# Patient Record
Sex: FEMALE | Race: WHITE | NOT HISPANIC OR LATINO | Employment: OTHER | URBAN - METROPOLITAN AREA
[De-identification: names, ages, dates, MRNs, and addresses within clinical notes are randomized per-mention and may not be internally consistent; named-entity substitution may affect disease eponyms.]

---

## 2017-03-14 ENCOUNTER — HISTORICAL (OUTPATIENT)
Dept: LAB | Facility: HOSPITAL | Age: 67
End: 2017-03-14

## 2018-03-19 ENCOUNTER — HISTORICAL (OUTPATIENT)
Dept: LAB | Facility: HOSPITAL | Age: 68
End: 2018-03-19

## 2018-03-19 ENCOUNTER — HISTORICAL (OUTPATIENT)
Dept: ADMINISTRATIVE | Facility: HOSPITAL | Age: 68
End: 2018-03-19

## 2018-03-19 LAB
ABS NEUT (OLG): 3.2
ALBUMIN SERPL-MCNC: 4.3 GM/DL (ref 3.4–5)
ALBUMIN/GLOB SERPL: 1.42 {RATIO} (ref 1.5–2.5)
ALP SERPL-CCNC: 67 UNIT/L (ref 38–126)
ALT SERPL-CCNC: 16 UNIT/L (ref 7–52)
APPEARANCE, UA: ABNORMAL
AST SERPL-CCNC: 20 UNIT/L (ref 15–37)
BACTERIA #/AREA URNS AUTO: ABNORMAL /HPF
BILIRUB SERPL-MCNC: 0.6 MG/DL (ref 0.2–1)
BILIRUB UR QL STRIP: NEGATIVE MG/DL
BILIRUBIN DIRECT+TOT PNL SERPL-MCNC: 0.2 MG/DL (ref 0–0.5)
BUN SERPL-MCNC: 16 MG/DL (ref 7–18)
CALCIUM SERPL-MCNC: 9.2 MG/DL (ref 8.5–10)
CHLORIDE SERPL-SCNC: 105 MMOL/L (ref 98–107)
CHOLEST SERPL-MCNC: 168 MG/DL (ref 0–200)
CHOLEST/HDLC SERPL: 2.4 {RATIO}
CO2 SERPL-SCNC: 31 MMOL/L (ref 21–32)
COLOR UR: YELLOW
CREAT SERPL-MCNC: 0.52 MG/DL (ref 0.6–1.3)
CREAT/UREA NIT SERPL: 30.8
ERYTHROCYTE [DISTWIDTH] IN BLOOD BY AUTOMATED COUNT: 13.5 % (ref 11.5–17)
GGT SERPL-CCNC: 14 UNIT/L (ref 5–85)
GLOBULIN SER-MCNC: 3 GM/DL (ref 1.2–3)
GLUCOSE (UA): NEGATIVE MG/DL
GLUCOSE SERPL-MCNC: 103 MG/DL (ref 74–106)
HCT VFR BLD AUTO: 36.4 % (ref 37–47)
HDLC SERPL-MCNC: 71 MG/DL (ref 35–60)
HGB BLD-MCNC: 12.3 GM/DL (ref 12–16)
HGB UR QL STRIP: ABNORMAL UNIT/L
KETONES UR QL STRIP: NEGATIVE MG/DL
LDH SERPL-CCNC: 162 UNIT/L (ref 140–271)
LDLC SERPL CALC-MCNC: 69 MG/DL (ref 0–129)
LEUKOCYTE ESTERASE UR QL STRIP: ABNORMAL UNIT/L
LYMPHOCYTES # BLD AUTO: 1 X10(3)/MCL (ref 0.6–3.4)
LYMPHOCYTES NFR BLD AUTO: 23.1 % (ref 13–40)
MCH RBC QN AUTO: 30.9 PG (ref 27–31.2)
MCHC RBC AUTO-ENTMCNC: 34 GM/DL (ref 32–36)
MCV RBC AUTO: 92 FL (ref 80–94)
MONOCYTES # BLD AUTO: 0.3 X10(3)/MCL (ref 0–1.8)
MONOCYTES NFR BLD AUTO: 7 % (ref 0.1–24)
NEUTROPHILS NFR BLD AUTO: 69.9 % (ref 47–80)
NITRITE UR QL STRIP.AUTO: POSITIVE
PH UR STRIP: 7.5 [PH]
PLATELET # BLD AUTO: 359 X10(3)/MCL (ref 130–400)
PMV BLD AUTO: 8.3 FL
POTASSIUM SERPL-SCNC: 4.3 MMOL/L (ref 3.5–5.1)
PROT SERPL-MCNC: 7.3 GM/DL (ref 6.4–8.2)
PROT UR QL STRIP: NEGATIVE MG/DL
RBC # BLD AUTO: 3.98 X10(6)/MCL (ref 4.2–5.4)
RBC #/AREA URNS HPF: ABNORMAL /HPF
SODIUM SERPL-SCNC: 141 MMOL/L (ref 136–145)
SP GR UR STRIP: 1.02
SQUAMOUS EPITHELIAL, UA: ABNORMAL /LPF
TRIGL SERPL-MCNC: 79 MG/DL (ref 30–150)
UROBILINOGEN UR STRIP-ACNC: 0.2 MG/DL
VLDLC SERPL CALC-MCNC: 15.8 MG/DL
WBC # SPEC AUTO: 4.5 X10(3)/MCL (ref 4.5–11.5)
WBC #/AREA URNS AUTO: ABNORMAL /[HPF]

## 2019-03-20 ENCOUNTER — HISTORICAL (OUTPATIENT)
Dept: ADMINISTRATIVE | Facility: HOSPITAL | Age: 69
End: 2019-03-20

## 2019-03-20 LAB
ABS NEUT (OLG): 4 X10(3)/MCL (ref 2.1–9.2)
ALBUMIN SERPL-MCNC: 4 GM/DL (ref 3.4–5)
ALBUMIN/GLOB SERPL: 1.38 {RATIO} (ref 1.5–2.5)
ALP SERPL-CCNC: 54 UNIT/L (ref 38–126)
ALT SERPL-CCNC: 11 UNIT/L (ref 7–52)
APPEARANCE, UA: ABNORMAL
AST SERPL-CCNC: 20 UNIT/L (ref 15–37)
BACTERIA #/AREA URNS AUTO: ABNORMAL /HPF
BILIRUB SERPL-MCNC: 0.5 MG/DL (ref 0.2–1)
BILIRUB UR QL STRIP: NEGATIVE MG/DL
BILIRUBIN DIRECT+TOT PNL SERPL-MCNC: 0.1 MG/DL (ref 0–0.5)
BILIRUBIN DIRECT+TOT PNL SERPL-MCNC: 0.4 MG/DL
BUN SERPL-MCNC: 18 MG/DL (ref 7–18)
CALCIUM SERPL-MCNC: 8.8 MG/DL (ref 8.5–10)
CHLORIDE SERPL-SCNC: 104 MMOL/L (ref 98–107)
CHOLEST SERPL-MCNC: 213 MG/DL (ref 0–200)
CHOLEST/HDLC SERPL: 3.5 {RATIO}
CO2 SERPL-SCNC: 29 MMOL/L (ref 21–32)
COLOR UR: YELLOW
CREAT SERPL-MCNC: 0.51 MG/DL (ref 0.6–1.3)
ERYTHROCYTE [DISTWIDTH] IN BLOOD BY AUTOMATED COUNT: 12.7 % (ref 11.5–17)
GLOBULIN SER-MCNC: 3 GM/DL (ref 1.2–3)
GLUCOSE (UA): NEGATIVE MG/DL
GLUCOSE SERPL-MCNC: 95 MG/DL (ref 74–106)
HCT VFR BLD AUTO: 35.3 % (ref 37–47)
HDLC SERPL-MCNC: 61 MG/DL (ref 35–60)
HGB BLD-MCNC: 11.7 GM/DL (ref 12–16)
HGB UR QL STRIP: ABNORMAL UNIT/L
KETONES UR QL STRIP: ABNORMAL MG/DL
LDLC SERPL CALC-MCNC: 121 MG/DL (ref 0–129)
LEUKOCYTE ESTERASE UR QL STRIP: NEGATIVE UNIT/L
LYMPHOCYTES # BLD AUTO: 1 X10(3)/MCL (ref 0.6–3.4)
LYMPHOCYTES NFR BLD AUTO: 18.7 % (ref 13–40)
MCH RBC QN AUTO: 30.4 PG (ref 27–31.2)
MCHC RBC AUTO-ENTMCNC: 33 GM/DL (ref 32–36)
MCV RBC AUTO: 92 FL (ref 80–94)
MONOCYTES # BLD AUTO: 0.4 X10(3)/MCL (ref 0.1–1.3)
MONOCYTES NFR BLD AUTO: 8.3 % (ref 0.1–24)
NEUTROPHILS NFR BLD AUTO: 73 % (ref 47–80)
NITRITE UR QL STRIP.AUTO: POSITIVE
PH UR STRIP: 6 [PH]
PLATELET # BLD AUTO: 496 X10(3)/MCL (ref 130–400)
PMV BLD AUTO: 8.4 FL (ref 9.4–12.4)
POTASSIUM SERPL-SCNC: 4.5 MMOL/L (ref 3.5–5.1)
PROT SERPL-MCNC: 6.9 GM/DL (ref 6.4–8.2)
PROT UR QL STRIP: NEGATIVE MG/DL
RBC # BLD AUTO: 3.85 X10(6)/MCL (ref 4.2–5.4)
RBC #/AREA URNS HPF: ABNORMAL /HPF
SODIUM SERPL-SCNC: 138 MMOL/L (ref 136–145)
SP GR UR STRIP: 1.02
SQUAMOUS EPITHELIAL, UA: ABNORMAL /LPF
TRIGL SERPL-MCNC: 79 MG/DL (ref 30–150)
TSH SERPL-ACNC: 0.72 MIU/ML (ref 0.35–4.94)
UROBILINOGEN UR STRIP-ACNC: 0.2 MG/DL
VLDLC SERPL CALC-MCNC: 15.8 MG/DL
WBC # SPEC AUTO: 5.4 X10(3)/MCL (ref 4.5–11.5)
WBC #/AREA URNS AUTO: ABNORMAL /[HPF]

## 2019-08-05 ENCOUNTER — HISTORICAL (OUTPATIENT)
Dept: ADMINISTRATIVE | Facility: HOSPITAL | Age: 69
End: 2019-08-05

## 2019-08-05 LAB
ABS NEUT (OLG): 2.5 X10(3)/MCL (ref 2.1–9.2)
ALBUMIN SERPL-MCNC: 4 GM/DL (ref 3.4–5)
ALBUMIN/GLOB SERPL: 1.29 {RATIO} (ref 1.5–2.5)
ALP SERPL-CCNC: 65 UNIT/L (ref 38–126)
ALT SERPL-CCNC: 10 UNIT/L (ref 7–52)
AST SERPL-CCNC: 17 UNIT/L (ref 15–37)
BILIRUB SERPL-MCNC: 0.6 MG/DL (ref 0.2–1)
BILIRUBIN DIRECT+TOT PNL SERPL-MCNC: 0.2 MG/DL (ref 0–0.5)
BILIRUBIN DIRECT+TOT PNL SERPL-MCNC: 0.4 MG/DL
BUN SERPL-MCNC: 14 MG/DL (ref 7–18)
CALCIUM SERPL-MCNC: 8.6 MG/DL (ref 8.5–10)
CHLORIDE SERPL-SCNC: 107 MMOL/L (ref 98–107)
CHOLEST SERPL-MCNC: 156 MG/DL (ref 0–200)
CHOLEST/HDLC SERPL: 2.6 {RATIO}
CO2 SERPL-SCNC: 31 MMOL/L (ref 21–32)
CREAT SERPL-MCNC: 0.59 MG/DL (ref 0.6–1.3)
ERYTHROCYTE [DISTWIDTH] IN BLOOD BY AUTOMATED COUNT: 13.8 % (ref 11.5–17)
GLOBULIN SER-MCNC: 3.1 GM/DL (ref 1.2–3)
GLUCOSE SERPL-MCNC: 104 MG/DL (ref 74–106)
HCT VFR BLD AUTO: 34.4 % (ref 37–47)
HDLC SERPL-MCNC: 61 MG/DL (ref 35–60)
HGB BLD-MCNC: 11.7 GM/DL (ref 12–16)
LDLC SERPL CALC-MCNC: 64 MG/DL (ref 0–129)
LYMPHOCYTES # BLD AUTO: 1.3 X10(3)/MCL (ref 0.6–3.4)
LYMPHOCYTES NFR BLD AUTO: 33.6 % (ref 13–40)
MCH RBC QN AUTO: 30.8 PG (ref 27–31.2)
MCHC RBC AUTO-ENTMCNC: 34 GM/DL (ref 32–36)
MCV RBC AUTO: 90 FL (ref 80–94)
MONOCYTES # BLD AUTO: 0.2 X10(3)/MCL (ref 0.1–1.3)
MONOCYTES NFR BLD AUTO: 4.1 % (ref 0.1–24)
NEUTROPHILS NFR BLD AUTO: 62.3 % (ref 47–80)
PLATELET # BLD AUTO: 522 X10(3)/MCL (ref 130–400)
PMV BLD AUTO: 8.4 FL (ref 9.4–12.4)
POTASSIUM SERPL-SCNC: 4.1 MMOL/L (ref 3.5–5.1)
PROT SERPL-MCNC: 7.1 GM/DL (ref 6.4–8.2)
RBC # BLD AUTO: 3.8 X10(6)/MCL (ref 4.2–5.4)
SODIUM SERPL-SCNC: 141 MMOL/L (ref 136–145)
TRIGL SERPL-MCNC: 71 MG/DL (ref 30–150)
VLDLC SERPL CALC-MCNC: 14.2 MG/DL
WBC # SPEC AUTO: 4 X10(3)/MCL (ref 4.5–11.5)

## 2019-09-30 LAB — CRC RECOMMENDATION EXT: NORMAL

## 2021-01-06 ENCOUNTER — HISTORICAL (OUTPATIENT)
Dept: ADMINISTRATIVE | Facility: HOSPITAL | Age: 71
End: 2021-01-06

## 2021-01-06 LAB
ABS NEUT (OLG): 3.5 X10(3)/MCL (ref 2.1–9.2)
ALBUMIN SERPL-MCNC: 4.2 GM/DL (ref 3.4–5)
ALBUMIN/GLOB SERPL: 1.56 {RATIO} (ref 1.5–2.5)
ALP SERPL-CCNC: 61 UNIT/L (ref 38–126)
ALT SERPL-CCNC: 15 UNIT/L (ref 7–52)
APPEARANCE, UA: ABNORMAL
AST SERPL-CCNC: 20 UNIT/L (ref 15–37)
BACTERIA #/AREA URNS AUTO: ABNORMAL /HPF
BILIRUB SERPL-MCNC: 0.5 MG/DL (ref 0.2–1)
BILIRUB UR QL STRIP: NEGATIVE MG/DL
BILIRUBIN DIRECT+TOT PNL SERPL-MCNC: 0.1 MG/DL (ref 0–0.5)
BILIRUBIN DIRECT+TOT PNL SERPL-MCNC: 0.4 MG/DL
BUN SERPL-MCNC: 20 MG/DL (ref 7–18)
CALCIUM SERPL-MCNC: 9.4 MG/DL (ref 8.5–10)
CHLORIDE SERPL-SCNC: 106 MMOL/L (ref 98–107)
CHOLEST SERPL-MCNC: 173 MG/DL (ref 0–200)
CHOLEST/HDLC SERPL: 2.5 {RATIO}
CO2 SERPL-SCNC: 29 MMOL/L (ref 21–32)
COLOR UR: YELLOW
CREAT SERPL-MCNC: 0.54 MG/DL (ref 0.6–1.3)
ERYTHROCYTE [DISTWIDTH] IN BLOOD BY AUTOMATED COUNT: 13.7 % (ref 11.5–17)
GLOBULIN SER-MCNC: 2.7 GM/DL (ref 1.2–3)
GLUCOSE (UA): NEGATIVE MG/DL
GLUCOSE SERPL-MCNC: 98 MG/DL (ref 74–106)
HCT VFR BLD AUTO: 37 % (ref 37–47)
HDLC SERPL-MCNC: 70 MG/DL (ref 35–60)
HGB BLD-MCNC: 12.4 GM/DL (ref 12–16)
HGB UR QL STRIP: ABNORMAL UNIT/L
KETONES UR QL STRIP: NEGATIVE MG/DL
LDLC SERPL CALC-MCNC: 82 MG/DL (ref 0–129)
LEUKOCYTE ESTERASE UR QL STRIP: ABNORMAL UNIT/L
LYMPHOCYTES # BLD AUTO: 1.1 X10(3)/MCL (ref 0.6–3.4)
LYMPHOCYTES NFR BLD AUTO: 22.5 % (ref 13–40)
MCH RBC QN AUTO: 30.5 PG (ref 27–31.2)
MCHC RBC AUTO-ENTMCNC: 34 GM/DL (ref 32–36)
MCV RBC AUTO: 91 FL (ref 80–94)
MONOCYTES # BLD AUTO: 0.3 X10(3)/MCL (ref 0.1–1.3)
MONOCYTES NFR BLD AUTO: 6.8 % (ref 0.1–24)
NEUTROPHILS NFR BLD AUTO: 70.7 % (ref 47–80)
NITRITE UR QL STRIP.AUTO: NEGATIVE
PH UR STRIP: 6 [PH]
PLATELET # BLD AUTO: 790 X10(3)/MCL (ref 130–400)
PMV BLD AUTO: 8.3 FL (ref 9.4–12.4)
POTASSIUM SERPL-SCNC: 4.2 MMOL/L (ref 3.5–5.1)
PROT SERPL-MCNC: 6.9 GM/DL (ref 6.4–8.2)
PROT UR QL STRIP: NEGATIVE MG/DL
RBC # BLD AUTO: 4.06 X10(6)/MCL (ref 4.2–5.4)
RBC #/AREA URNS HPF: ABNORMAL /HPF
SODIUM SERPL-SCNC: 141 MMOL/L (ref 136–145)
SP GR UR STRIP: 1.02
SQUAMOUS EPITHELIAL, UA: ABNORMAL /LPF
TRIGL SERPL-MCNC: 73 MG/DL (ref 30–150)
TSH SERPL-ACNC: 0.62 MIU/ML (ref 0.35–4.94)
UROBILINOGEN UR STRIP-ACNC: 0.2 MG/DL
VLDLC SERPL CALC-MCNC: 14.6 MG/DL
WBC # SPEC AUTO: 4.9 X10(3)/MCL (ref 4.5–11.5)
WBC #/AREA URNS AUTO: ABNORMAL /[HPF]

## 2022-01-14 ENCOUNTER — HISTORICAL (OUTPATIENT)
Dept: ADMINISTRATIVE | Facility: HOSPITAL | Age: 72
End: 2022-01-14

## 2022-01-14 LAB
ABS NEUT (OLG): 3.7 X10(3)/MCL (ref 2.1–9.2)
ALBUMIN SERPL-MCNC: 4.3 GM/DL (ref 3.4–5)
ALBUMIN/GLOB SERPL: 1.79 {RATIO} (ref 1.5–2.5)
ALP SERPL-CCNC: 63 UNIT/L (ref 38–126)
ALT SERPL-CCNC: 16 UNIT/L (ref 7–52)
APPEARANCE, UA: ABNORMAL
AST SERPL-CCNC: 23 UNIT/L (ref 15–37)
BACTERIA #/AREA URNS AUTO: ABNORMAL /HPF
BILIRUB SERPL-MCNC: 0.5 MG/DL (ref 0.2–1)
BILIRUB UR QL STRIP: NEGATIVE MG/DL
BILIRUBIN DIRECT+TOT PNL SERPL-MCNC: 0.1 MG/DL (ref 0–0.5)
BILIRUBIN DIRECT+TOT PNL SERPL-MCNC: 0.4 MG/DL
BUN SERPL-MCNC: 19 MG/DL (ref 7–18)
CALCIUM SERPL-MCNC: 9.6 MG/DL (ref 8.5–10)
CHLORIDE SERPL-SCNC: 105 MMOL/L (ref 98–107)
CHOLEST SERPL-MCNC: 178 MG/DL (ref 0–200)
CHOLEST/HDLC SERPL: 2.5 {RATIO}
CO2 SERPL-SCNC: 29 MMOL/L (ref 21–32)
COLOR UR: YELLOW
CREAT SERPL-MCNC: 0.59 MG/DL (ref 0.6–1.3)
ERYTHROCYTE [DISTWIDTH] IN BLOOD BY AUTOMATED COUNT: 14.5 % (ref 11.5–17)
GLOBULIN SER-MCNC: 2.4 GM/DL (ref 1.2–3)
GLUCOSE (UA): NEGATIVE MG/DL
GLUCOSE SERPL-MCNC: 99 MG/DL (ref 74–106)
HCT VFR BLD AUTO: 33.7 % (ref 37–47)
HDLC SERPL-MCNC: 70 MG/DL (ref 35–60)
HGB BLD-MCNC: 11.1 GM/DL (ref 12–16)
HGB UR QL STRIP: ABNORMAL UNIT/L
KETONES UR QL STRIP: NEGATIVE MG/DL
LDLC SERPL CALC-MCNC: 84 MG/DL (ref 0–129)
LEUKOCYTE ESTERASE UR QL STRIP: NEGATIVE UNIT/L
LYMPHOCYTES # BLD AUTO: 1.2 X10(3)/MCL (ref 0.6–3.4)
LYMPHOCYTES NFR BLD AUTO: 21.1 % (ref 13–40)
MCH RBC QN AUTO: 31 PG (ref 27–31.2)
MCHC RBC AUTO-ENTMCNC: 33 GM/DL (ref 32–36)
MCV RBC AUTO: 94 FL (ref 80–94)
MONOCYTES # BLD AUTO: 0.6 X10(3)/MCL (ref 0.1–1.3)
MONOCYTES NFR BLD AUTO: 10.3 % (ref 0.1–24)
NEUTROPHILS NFR BLD AUTO: 68.6 % (ref 47–80)
NITRITE UR QL STRIP.AUTO: NEGATIVE
PH UR STRIP: 7 [PH]
PLATELET # BLD AUTO: 940 X10(3)/MCL (ref 130–400)
PMV BLD AUTO: 8.1 FL (ref 9.4–12.4)
POTASSIUM SERPL-SCNC: 4.5 MMOL/L (ref 3.5–5.1)
PROT SERPL-MCNC: 6.7 GM/DL (ref 6.4–8.2)
PROT UR QL STRIP: NEGATIVE MG/DL
RBC # BLD AUTO: 3.58 X10(6)/MCL (ref 4.2–5.4)
RBC #/AREA URNS HPF: ABNORMAL /HPF
SODIUM SERPL-SCNC: 140 MMOL/L (ref 136–145)
SP GR UR STRIP: 1.02
SQUAMOUS EPITHELIAL, UA: ABNORMAL /LPF
TRIGL SERPL-MCNC: 102 MG/DL (ref 30–150)
TSH SERPL-ACNC: 0.93 MIU/ML (ref 0.35–4.94)
UROBILINOGEN UR STRIP-ACNC: 0.2 MG/DL
VLDLC SERPL CALC-MCNC: 20.4 MG/DL
WBC # SPEC AUTO: 5.5 X10(3)/MCL (ref 4.5–11.5)
WBC #/AREA URNS AUTO: ABNORMAL /[HPF]

## 2022-02-15 LAB — BCS RECOMMENDATION EXT: NORMAL

## 2022-03-15 ENCOUNTER — HISTORICAL (OUTPATIENT)
Dept: ADMINISTRATIVE | Facility: HOSPITAL | Age: 72
End: 2022-03-15

## 2022-03-15 LAB — IRON SERPL-MCNC: 111 UG/DL (ref 50–170)

## 2022-03-16 ENCOUNTER — HISTORICAL (OUTPATIENT)
Dept: ADMINISTRATIVE | Facility: HOSPITAL | Age: 72
End: 2022-03-16

## 2022-03-16 LAB
FERRITIN SERPL-MCNC: 86.23 NG/ML (ref 4.63–204)
IRON SATN MFR SERPL: 34 % (ref 20–50)
IRON SERPL-MCNC: 88 UG/DL (ref 50–170)
TIBC SERPL-MCNC: 169 UG/DL (ref 70–310)
TIBC SERPL-MCNC: 257 UG/DL (ref 250–450)
TRANSFERRIN SERPL-MCNC: 245 MG/DL (ref 173–360)

## 2022-04-04 ENCOUNTER — HISTORICAL (OUTPATIENT)
Dept: LAB | Facility: HOSPITAL | Age: 72
End: 2022-04-04

## 2022-04-04 LAB
ABS NEUT (OLG): 2.73 (ref 2.1–9.2)
ALBUMIN SERPL-MCNC: 4.2 G/DL (ref 3.4–4.8)
ALBUMIN/GLOB SERPL: 1.4 {RATIO} (ref 1.1–2)
ALP SERPL-CCNC: 69 U/L (ref 40–150)
ALT SERPL-CCNC: 17 U/L (ref 0–55)
AST SERPL-CCNC: 23 U/L (ref 5–34)
BASOPHILS # BLD AUTO: 0.05 10*3/UL (ref 0–0.2)
BASOPHILS NFR BLD AUTO: 1.3 % (ref 0–1)
BILIRUB SERPL-MCNC: 0.3 MG/DL (ref 0.2–1.2)
BILIRUBIN DIRECT+TOT PNL SERPL-MCNC: 0.1 (ref 0–0.5)
BILIRUBIN DIRECT+TOT PNL SERPL-MCNC: 0.2 (ref 0–0.8)
BUN SERPL-MCNC: 13.7 MG/DL (ref 9.8–20.1)
CALCIUM SERPL-MCNC: 9.8 MG/DL (ref 8.4–10.2)
CHLORIDE SERPL-SCNC: 106 MMOL/L (ref 98–107)
CO2 SERPL-SCNC: 30 MMOL/L (ref 23–31)
CREAT SERPL-MCNC: 0.71 MG/DL (ref 0.57–1.11)
EOSINOPHIL # BLD AUTO: 0.07 10*3/UL (ref 0–0.9)
EOSINOPHIL NFR BLD AUTO: 1.8 % (ref 0–6.4)
ERYTHROCYTE [DISTWIDTH] IN BLOOD BY AUTOMATED COUNT: 13.5 % (ref 11.5–17)
GLOBULIN SER-MCNC: 2.9 G/DL (ref 2.4–3.5)
GLUCOSE SERPL-MCNC: 91 MG/DL (ref 82–115)
HCT VFR BLD AUTO: 34.1 % (ref 37–47)
HEMOLYSIS INTERF INDEX SERPL-ACNC: 2
HGB BLD-MCNC: 11.1 G/DL (ref 12–16)
ICTERIC INTERF INDEX SERPL-ACNC: 0
IMM GRANULOCYTES # BLD AUTO: 0.01 10*3/UL (ref 0–0.02)
IMM GRANULOCYTES NFR BLD AUTO: 0.3 % (ref 0–0.43)
LIPEMIC INTERF INDEX SERPL-ACNC: 14
LYMPHOCYTES # BLD AUTO: 0.82 10*3/UL (ref 0.6–4.6)
LYMPHOCYTES NFR BLD AUTO: 20.8 % (ref 16–44)
MANUAL DIFF? (OHS): NO
MCH RBC QN AUTO: 30.1 PG (ref 27–31)
MCHC RBC AUTO-ENTMCNC: 32.6 G/DL (ref 33–36)
MCV RBC AUTO: 92.4 FL (ref 80–94)
MONOCYTES # BLD AUTO: 0.26 10*3/UL (ref 0.1–1.3)
MONOCYTES NFR BLD AUTO: 6.6 % (ref 4–12.1)
NEUTROPHILS # BLD AUTO: 2.73 10*3/UL (ref 2.1–9.2)
NEUTROPHILS NFR BLD AUTO: 69.2 % (ref 43–73)
NRBC BLD AUTO-RTO: 0 % (ref 0–0.2)
PLATELET # BLD AUTO: 781 10*3/UL (ref 130–400)
PMV BLD AUTO: 8.6 FL (ref 7.4–10.4)
POTASSIUM SERPL-SCNC: 4.8 MMOL/L (ref 3.5–5.1)
PROT SERPL-MCNC: 7.1 G/DL (ref 5.8–7.6)
RBC # BLD AUTO: 3.69 10*6/UL (ref 4.2–5.4)
SODIUM SERPL-SCNC: 144 MMOL/L (ref 136–145)
WBC # SPEC AUTO: 3.9 10*3/UL (ref 4.5–11.5)

## 2022-04-11 ENCOUNTER — HISTORICAL (OUTPATIENT)
Dept: ADMINISTRATIVE | Facility: HOSPITAL | Age: 72
End: 2022-04-11
Payer: MEDICARE

## 2022-04-11 ENCOUNTER — HISTORICAL (OUTPATIENT)
Dept: ADMINISTRATIVE | Facility: HOSPITAL | Age: 72
End: 2022-04-11

## 2022-04-11 LAB
ABS NEUT (OLG): 2.25 (ref 2.1–9.2)
BASOPHILS # BLD AUTO: 0.05 10*3/UL (ref 0–0.2)
BASOPHILS NFR BLD AUTO: 1.5 % (ref 0–1)
EOSINOPHIL # BLD AUTO: 0.04 10*3/UL (ref 0–0.9)
EOSINOPHIL NFR BLD AUTO: 1.2 % (ref 0–6.4)
ERYTHROCYTE [DISTWIDTH] IN BLOOD BY AUTOMATED COUNT: 14.2 % (ref 11.5–17)
HCT VFR BLD AUTO: 34.6 % (ref 37–47)
HGB BLD-MCNC: 11.4 G/DL (ref 12–16)
IMM GRANULOCYTES # BLD AUTO: 0.01 10*3/UL (ref 0–0.02)
IMM GRANULOCYTES NFR BLD AUTO: 0.3 % (ref 0–0.43)
LYMPHOCYTES # BLD AUTO: 0.79 10*3/UL (ref 0.6–4.6)
LYMPHOCYTES NFR BLD AUTO: 23.1 % (ref 16–44)
MANUAL DIFF? (OHS): NO
MCH RBC QN AUTO: 31.1 PG (ref 27–31)
MCHC RBC AUTO-ENTMCNC: 32.9 G/DL (ref 33–36)
MCV RBC AUTO: 94.5 FL (ref 80–94)
MONOCYTES # BLD AUTO: 0.28 10*3/UL (ref 0.1–1.3)
MONOCYTES NFR BLD AUTO: 8.2 % (ref 4–12.1)
NEUTROPHILS # BLD AUTO: 2.25 10*3/UL (ref 2.1–9.2)
NEUTROPHILS NFR BLD AUTO: 65.7 % (ref 43–73)
NRBC BLD AUTO-RTO: 0 % (ref 0–0.2)
PLATELET # BLD AUTO: 639 10*3/UL (ref 130–400)
PMV BLD AUTO: 8.9 FL (ref 7.4–10.4)
RBC # BLD AUTO: 3.66 10*6/UL (ref 4.2–5.4)
WBC # SPEC AUTO: 3.4 10*3/UL (ref 4.5–11.5)

## 2022-04-18 ENCOUNTER — HISTORICAL (OUTPATIENT)
Dept: LAB | Facility: HOSPITAL | Age: 72
End: 2022-04-18
Payer: MEDICARE

## 2022-04-18 LAB
ABS NEUT (OLG): 2.07 (ref 2.1–9.2)
BASOPHILS # BLD AUTO: 0.04 10*3/UL (ref 0–0.2)
BASOPHILS NFR BLD AUTO: 1.3 % (ref 0–1)
EOSINOPHIL # BLD AUTO: 0.02 10*3/UL (ref 0–0.9)
EOSINOPHIL NFR BLD AUTO: 0.7 % (ref 0–6.4)
ERYTHROCYTE [DISTWIDTH] IN BLOOD BY AUTOMATED COUNT: 15.3 % (ref 11.5–17)
HCT VFR BLD AUTO: 34.4 % (ref 37–47)
HGB BLD-MCNC: 11.3 G/DL (ref 12–16)
IMM GRANULOCYTES # BLD AUTO: 0.01 10*3/UL (ref 0–0.02)
IMM GRANULOCYTES NFR BLD AUTO: 0.3 % (ref 0–0.43)
LYMPHOCYTES # BLD AUTO: 0.68 10*3/UL (ref 0.6–4.6)
LYMPHOCYTES NFR BLD AUTO: 22.2 % (ref 16–44)
MANUAL DIFF? (OHS): NO
MCH RBC QN AUTO: 30.3 PG (ref 27–31)
MCHC RBC AUTO-ENTMCNC: 32.8 G/DL (ref 33–36)
MCV RBC AUTO: 92.2 FL (ref 80–94)
MONOCYTES # BLD AUTO: 0.24 10*3/UL (ref 0.1–1.3)
MONOCYTES NFR BLD AUTO: 7.8 % (ref 4–12.1)
NEUTROPHILS # BLD AUTO: 2.07 10*3/UL (ref 2.1–9.2)
NEUTROPHILS NFR BLD AUTO: 67.7 % (ref 43–73)
NRBC BLD AUTO-RTO: 0 % (ref 0–0.2)
PLATELET # BLD AUTO: 543 10*3/UL (ref 130–400)
PMV BLD AUTO: 8.7 FL (ref 7.4–10.4)
RBC # BLD AUTO: 3.73 10*6/UL (ref 4.2–5.4)
WBC # SPEC AUTO: 3.1 10*3/UL (ref 4.5–11.5)

## 2022-04-27 ENCOUNTER — HISTORICAL (OUTPATIENT)
Dept: ADMINISTRATIVE | Facility: HOSPITAL | Age: 72
End: 2022-04-27
Payer: MEDICARE

## 2022-04-27 LAB
ABS NEUT (OLG): 1.93 (ref 2.1–9.2)
BASOPHILS # BLD AUTO: 0 10*3/UL (ref 0–0.2)
BASOPHILS NFR BLD AUTO: 1 %
EOSINOPHIL # BLD AUTO: 0 10*3/UL (ref 0–0.9)
EOSINOPHIL NFR BLD AUTO: 0.7 %
ERYTHROCYTE [DISTWIDTH] IN BLOOD BY AUTOMATED COUNT: 16.8 % (ref 11.5–17)
HCT VFR BLD AUTO: 32.6 % (ref 37–47)
HGB BLD-MCNC: 10.7 G/DL (ref 12–16)
LYMPHOCYTES # BLD AUTO: 0.7 10*3/UL (ref 0.6–4.6)
LYMPHOCYTES NFR BLD AUTO: 23.5 %
MANUAL DIFF? (OHS): NO
MCH RBC QN AUTO: 31.4 PG (ref 27–31)
MCHC RBC AUTO-ENTMCNC: 32.8 G/DL (ref 33–36)
MCV RBC AUTO: 95.6 FL (ref 80–94)
MONOCYTES # BLD AUTO: 0.2 10*3/UL (ref 0.1–1.3)
MONOCYTES NFR BLD AUTO: 8 %
NEUTROPHILS # BLD AUTO: 1.9 10*3/UL (ref 2.1–9.2)
NEUTROPHILS NFR BLD AUTO: 66.8 %
PLATELET # BLD AUTO: 504 10*3/UL (ref 130–400)
PMV BLD AUTO: 8.7 FL (ref 9.4–12.4)
RBC # BLD AUTO: 3.41 10*6/UL (ref 4.2–5.4)
WBC # SPEC AUTO: 2.9 10*3/UL (ref 4.5–11.5)

## 2022-04-28 VITALS
BODY MASS INDEX: 28.2 KG/M2 | DIASTOLIC BLOOD PRESSURE: 74 MMHG | HEIGHT: 67 IN | WEIGHT: 179.69 LBS | SYSTOLIC BLOOD PRESSURE: 136 MMHG

## 2022-05-11 DIAGNOSIS — D75.839 THROMBOCYTOSIS: Primary | ICD-10-CM

## 2022-05-13 ENCOUNTER — LAB VISIT (OUTPATIENT)
Dept: LAB | Facility: HOSPITAL | Age: 72
End: 2022-05-13
Attending: INTERNAL MEDICINE
Payer: MEDICARE

## 2022-05-13 DIAGNOSIS — D75.839 THROMBOCYTOSIS: ICD-10-CM

## 2022-05-13 LAB
BASOPHILS # BLD AUTO: 0.05 X10(3)/MCL (ref 0–0.2)
BASOPHILS NFR BLD AUTO: 1.4 %
EOSINOPHIL # BLD AUTO: 0.02 X10(3)/MCL (ref 0–0.9)
EOSINOPHIL NFR BLD AUTO: 0.6 %
ERYTHROCYTE [DISTWIDTH] IN BLOOD BY AUTOMATED COUNT: 19.9 % (ref 11.5–17)
HCT VFR BLD AUTO: 33.6 % (ref 37–47)
HGB BLD-MCNC: 11 GM/DL (ref 12–16)
IMM GRANULOCYTES # BLD AUTO: 0.01 X10(3)/MCL (ref 0–0.02)
IMM GRANULOCYTES NFR BLD AUTO: 0.3 % (ref 0–0.43)
LYMPHOCYTES # BLD AUTO: 0.64 X10(3)/MCL (ref 0.6–4.6)
LYMPHOCYTES NFR BLD AUTO: 18 %
MCH RBC QN AUTO: 32.4 PG (ref 27–31)
MCHC RBC AUTO-ENTMCNC: 32.7 MG/DL (ref 33–36)
MCV RBC AUTO: 99.1 FL (ref 80–94)
MONOCYTES # BLD AUTO: 0.23 X10(3)/MCL (ref 0.1–1.3)
MONOCYTES NFR BLD AUTO: 6.5 %
NEUTROPHILS # BLD AUTO: 2.6 X10(3)/MCL (ref 2.1–9.2)
NEUTROPHILS NFR BLD AUTO: 73.2 %
PLATELET # BLD AUTO: 489 X10(3)/MCL (ref 130–400)
PMV BLD AUTO: 9 FL (ref 9.4–12.4)
RBC # BLD AUTO: 3.39 X10(6)/MCL (ref 4.2–5.4)
WBC # SPEC AUTO: 3.6 X10(3)/MCL (ref 4.5–11.5)

## 2022-05-13 PROCEDURE — 85025 COMPLETE CBC W/AUTO DIFF WBC: CPT

## 2022-05-13 PROCEDURE — 36415 COLL VENOUS BLD VENIPUNCTURE: CPT

## 2022-05-18 ENCOUNTER — LAB VISIT (OUTPATIENT)
Dept: LAB | Facility: HOSPITAL | Age: 72
End: 2022-05-18
Attending: INTERNAL MEDICINE
Payer: MEDICARE

## 2022-05-18 DIAGNOSIS — D75.839 THROMBOCYTOSIS: ICD-10-CM

## 2022-05-18 LAB
BASOPHILS # BLD AUTO: 0.03 X10(3)/MCL (ref 0–0.2)
BASOPHILS NFR BLD AUTO: 1.1 %
EOSINOPHIL # BLD AUTO: 0.02 X10(3)/MCL (ref 0–0.9)
EOSINOPHIL NFR BLD AUTO: 0.7 %
ERYTHROCYTE [DISTWIDTH] IN BLOOD BY AUTOMATED COUNT: 21.2 % (ref 11.5–17)
HCT VFR BLD AUTO: 33.4 % (ref 37–47)
HGB BLD-MCNC: 10.9 GM/DL (ref 12–16)
IMM GRANULOCYTES # BLD AUTO: 0 X10(3)/MCL (ref 0–0.02)
IMM GRANULOCYTES NFR BLD AUTO: 0 % (ref 0–0.43)
LYMPHOCYTES # BLD AUTO: 0.8 X10(3)/MCL (ref 0.6–4.6)
LYMPHOCYTES NFR BLD AUTO: 29.6 %
MCH RBC QN AUTO: 32.9 PG (ref 27–31)
MCHC RBC AUTO-ENTMCNC: 32.6 MG/DL (ref 33–36)
MCV RBC AUTO: 100.9 FL (ref 80–94)
MONOCYTES # BLD AUTO: 0.24 X10(3)/MCL (ref 0.1–1.3)
MONOCYTES NFR BLD AUTO: 8.9 %
PLATELET # BLD AUTO: 439 X10(3)/MCL (ref 130–400)
PMV BLD AUTO: 8.7 FL (ref 9.4–12.4)
RBC # BLD AUTO: 3.31 X10(6)/MCL (ref 4.2–5.4)
WBC # SPEC AUTO: 2.7 X10(3)/MCL (ref 4.5–11.5)

## 2022-05-18 PROCEDURE — 85025 COMPLETE CBC W/AUTO DIFF WBC: CPT

## 2022-05-18 PROCEDURE — 36415 COLL VENOUS BLD VENIPUNCTURE: CPT

## 2022-05-21 NOTE — HISTORICAL [facility]ER
This is a historical note converted from Barbie. Formatting and pictures may have been removed.  Please reference Barbie for original formatting and attached multimedia. Chief Complaint  Annual medicare wellness/fasting  History of Present Illness  Patient is here today for wellness CPX. ?She is tolerating all medications without side effects. ?She does complain of occasional left SI joint disc comfort.? Her mammogram is due in February.? She is not exercising regularly but will try to increase walking 250 minutes/week.? She would like her flu shot today. ?She did have COVID back in December?and recovered fully other than?lingering fatigue.  Review of Systems  GENERAL:??no?unexplained wtloss, fever, + fatigue, chills, night sweats or weakness  HEENT: no? sore throat, ear pain, sinus pressure, nasal congestion, or rhinorrhea  VISION:?no vision changes, glaucoma, cataracts  CARDIAC: no?chest pain,?palpitations,?Dyspnea on exertion,?orthopnea  RESPIRATORY:?no?cough,?wheezing, sputum production,or?SOB--Had covid in DEC  GI: no??abdominal pain,?n&v, constipation,?diarrhea,??blood in stool or_family history of colon cancer_  :?no? dysuria, hematuria, frequency, ?urgency, incontinence,? vaginal discharge,? abn. vaginal bleeding  MUSC/SKEL:? no? myalgia, weakness, edema,? arthralgia, or?joint effusion--+SI jt  SKIN:? No?rash, hives, ?itching or ?sores--had basal cell removed  NEURO:? No headaches, numbness, ?tingling, weakness, or?dizziness  PSYCH:? ok anxiety and ??depression, no ?irritability, ?suicidal ideation or?hallucinations  ENDO:? No polyuria, polydipsia, ?polyphagia  HEME:? No?Bruising, ?lymphadenopathy, bleeding disorders ?or?signs of anemia  Physical Exam  Vitals & Measurements  HR:?88(Peripheral)? BP:?136/74?  HT:?170.10?cm? WT:?81.500?kg? BMI:?28.17?  GENERAL: NAD, alert and oriented x 3  SKIN:? no rash or abnormal appearing skin lesions  HEENT:? PERRLA, EOMI, mouth wnl, throat wnl, EAC and TM wnl  bilaterally  NECK:? FROM, no lymphadenopathy, no thyroid abnormalities palpable  CHEST:? CTA bilaterally no wheezes, crackles or rubs  CARDIAC:? RRR, no murmurs audible  ABDOMEN:? Soft, nontender, nondistended, NBSx4,?no rebound or guarding, no HSM  EXTREMITIES:? no clubbing, cyanosis, or edema.? joints wnl. +2 DP/PT pulse bilaterally  NEURO:? no sensory or motor deficits noted. CN II-XII intact. Gait wnl.?  GENITAL: defer to gyn none, set up MMG  Assessment/Plan  1.?Wellness examination?Z00.00  CBC, CMP, FLP, TSH, U/A, GYN none, MMG?due 2/11/2022 OLOL, Dexa 2/2021 due 2023, colonoscopy 2019 due 2029, Encourage pt to increase cardiovascular exercise and attempt to obtain at least 150 minutes of moderate aerobic exercise per week or 75 minutes of vigorous aerobic exercise weekly.? Recommend at least 2 days of weight bearing exercise to help increase bone density.  Ordered:  Clinic Follow-Up Wellness, *Est. 01/14/23 3:00:00 CST, Order for future visit, Wellness examination  Hypertension  Hypercholesterolemia  Depression, [facility]Link AFP  Lab Collection Request, 01/14/22 7:54:00 CST, [facility] AMB - AFP, 01/14/22 7:54:00 CST, Wellness examination  Hypertension  Hypercholesterolemia  Depression  Lab Collection Request, 01/14/22 8:00:00 CST, [facility]LINK AMB - AFP, 01/14/22 8:00:00 CST, Wellness examination  Hypertension  Hypercholesterolemia  Depression  SI (sacroiliac) pain  Medicare Annual Wellness- Subsequent  PC, Wellness examination  Hypertension  Depression  Hypercholesterolemia, [facility]LINK AMB - AFP, 01/14/22 7:54:00 CST  Schedule Diagnostics Study, screening MMG, OLOL after 2/11/22, 01/14/22 7:54:00 CST, Wellness examination  ?  2.?Encounter for immunization?Z23  high dose flu shot, got covid vaccine and booster  Ordered:  Influenza Virus Vaccine, Inactivated, 0.5 mL, IM, Once-Unscheduled, first dose 01/14/22 7:54:00 CST  ?  3.?Hypertension?I10  ?continue Amlodipine 10 mg and Benazepril 40 mg  Ordered:  Clinic  Follow-Up Wellness, *Est. 01/14/23 3:00:00 CST, Order for future visit, Wellness examination  Hypertension  Hypercholesterolemia  Depression, [facility]Link AFP  Comprehensive Metabolic Panel, Routine collect, 01/14/22 8:00:00 CST, Blood, Order for future visit, Stop date 01/14/22 8:00:00 CST, Lab Collect, Hypertension, 01/14/22 8:00:00 CST  Lab Collection Request, 01/14/22 7:54:00 CST, [facility]LINK AMB - AFP, 01/14/22 7:54:00 CST, Wellness examination  Hypertension  Hypercholesterolemia  Depression  Lab Collection Request, 01/14/22 8:00:00 CST, HLINK AMB - AFP, 01/14/22 8:00:00 CST, Wellness examination  Hypertension  Hypercholesterolemia  Depression  SI (sacroiliac) pain  Medicare Annual Wellness- Subsequent  PC, Wellness examination  Hypertension  Depression  Hypercholesterolemia, [facility]LINK AMB - AFP, 01/14/22 7:54:00 CST  Thyroid Stimulating Hormone, Routine collect, 01/14/22 8:00:00 CST, Blood, Order for future visit, Stop date 01/14/22 8:00:00 CST, Lab Collect, Hypertension  Hypercholesterolemia  Depression, 01/14/22 8:00:00 CST  Urinalysis no Reflex, Routine collect, Urine, Order for future visit, 01/14/22 8:00:00 CST, Stop date 01/14/22 8:00:00 CST, Nurse collect, Hypertension  ?  4.?Hypercholesterolemia?E78.00  ?continue atorvastatin 20 mg  Ordered:  CBC w/ Auto Diff, Routine collect, 01/14/22 8:00:00 CST, Blood, Order for future visit, Stop date 01/14/22 8:00:00 CST, Lab Collect, SI (sacroiliac) pain  Hypercholesterolemia, 01/14/22 8:00:00 CST  Clinic Follow-Up Wellness, *Est. 01/14/23 3:00:00 CST, Order for future visit, Wellness examination  Hypertension  Hypercholesterolemia  Depression, HLink AFP  Lab Collection Request, 01/14/22 7:54:00 CST, HLINK AMB - AFP, 01/14/22 7:54:00 CST, Wellness examination  Hypertension  Hypercholesterolemia  Depression  Lab Collection Request, 01/14/22 8:00:00 CST, HLINK AMB - AFP, 01/14/22 8:00:00 CST, Wellness examination  Hypertension  Hypercholesterolemia   Depression  SI (sacroiliac) pain  Lipid Panel, Routine collect, 01/14/22 8:00:00 CST, Blood, Order for future visit, Stop date 01/14/22 8:00:00 CST, Lab Collect, Hypercholesterolemia, 01/14/22 8:00:00 CST  Medicare Annual Wellness- Subsequent  PC, Wellness examination  Hypertension  Depression  Hypercholesterolemia, HLINK AMB - AFP, 01/14/22 7:54:00 CST  Thyroid Stimulating Hormone, Routine collect, 01/14/22 8:00:00 CST, Blood, Order for future visit, Stop date 01/14/22 8:00:00 CST, Lab Collect, Hypertension  Hypercholesterolemia  Depression, 01/14/22 8:00:00 CST  ?  5.?Depression?F32.9  ?continue Lexapro 20 mg 1/2 daily  Ordered:  Clinic Follow-Up Wellness, *Est. 01/14/23 3:00:00 CST, Order for future visit, Wellness examination  Hypertension  Hypercholesterolemia  Depression, HLink AFP  Lab Collection Request, 01/14/22 7:54:00 CST, HLINK AMB - AFP, 01/14/22 7:54:00 CST, Wellness examination  Hypertension  Hypercholesterolemia  Depression  Lab Collection Request, 01/14/22 8:00:00 CST, HLINK AMB - AFP, 01/14/22 8:00:00 CST, Wellness examination  Hypertension  Hypercholesterolemia  Depression  SI (sacroiliac) pain  Medicare Annual Wellness- Subsequent  PC, Wellness examination  Hypertension  Depression  Hypercholesterolemia, HLINK AMB - AFP, 01/14/22 7:54:00 CST  Thyroid Stimulating Hormone, Routine collect, 01/14/22 8:00:00 CST, Blood, Order for future visit, Stop date 01/14/22 8:00:00 CST, Lab Collect, Hypertension  Hypercholesterolemia  Depression, 01/14/22 8:00:00 CST  ?  6.?SI (sacroiliac) pain?M53.3  left--voltaren gel,stretches  Ordered:  CBC w/ Auto Diff, Routine collect, 01/14/22 8:00:00 CST, Blood, Order for future visit, Stop date 01/14/22 8:00:00 CST, Lab Collect, SI (sacroiliac) pain  Hypercholesterolemia, 01/14/22 8:00:00 CST  Lab Collection Request, 01/14/22 8:00:00 CST, HLINK AMB - AFP, 01/14/22 8:00:00 CST, Wellness examination  Hypertension  Hypercholesterolemia   Depression  SI (sacroiliac) pain  ?  Orders:  amLODIPine, See Instructions, TAKE 1 TABLET BY MOUTH DAILY, # 90 tab(s), 3 Refill(s), Pharmacy: [facility], 170.1, cm, Height/Length Dosing, 01/14/22 7:40:00 CST, 81.5, kg, Weight Dosing, 01/14/22 7:40:00 CST  atorvastatin, See Instructions, TAKE ONE TABLET BY MOUTH DAILY, # 90 tab(s), 3 Refill(s), Pharmacy: [facility], 170.1, cm, Height/Length Dosing, 01/14/22 7:40:00 CST, 81.5, kg, Weight Dosing, 01/14/22 7:40:00 CST  benazepril, See Instructions, TAKE 1 TABLET BY MOUTH DAILY, # 90 tab(s), 3 Refill(s), Pharmacy: [facility], 170.1, cm, Height/Length Dosing, 01/14/22 7:40:00 CST, 81.5, kg, Weight Dosing, 01/14/22 7:40:00 CST  escitalopram, See Instructions, TAKE 1/2 TABLET BY MOUTH DAILY, # 45 tab(s), 3 Refill(s), Pharmacy: [facility], 170.1, cm, Height/Length Dosing, 01/14/22 7:40:00 CST, 81.5, kg, Weight Dosing, 01/14/22 7:40:00 CST  Referrals  Clinic Follow-Up Wellness, *Est. 01/14/23 3:00:00 CST, Order for future visit, Wellness examination  Hypertension  Hypercholesterolemia  Depression, HLink AFP   Problem List/Past Medical History  Ongoing  Depression  Hypercholesterolemia  Hypertension  Wellness examination  Historical  Depression  HTN - Hypertension  Hypercholesterolemia  Hypertension  Procedure/Surgical History  Colonoscopy (09/30/2019)  Mammogram (04/18/2018)  Colonoscopy (04/30/2009)  Excision of basal cell carcinoma  High ligation and local ligation of varicose veins   Medications  Adult High-Dose Influenza Vaccine, 0.5 mL, IM, Once-Unscheduled  amLODIPine 10 mg oral tablet, See Instructions, 3 refills  atorvastatin 20 mg oral tablet, See Instructions, 3 refills  benazepril 40 mg oral tablet, See Instructions, 3 refills  Centrum oral tablet, 1 tab(s), Oral, Daily  Citracal Regular, 1 tab(s), Oral, Daily  escitalopram 20 mg oral tablet, See Instructions, 3  refills  Allergies  Cipro?(Unknown)  Social History  Abuse/Neglect  No, 01/14/2022  No, 01/06/2021  No, 10/23/2019  Alcohol  Never, 03/16/2018  Employment/School  HOMEMAKER, 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  Dementia: Brother.  Drowning: Sister.  Hypertension.: Mother and Brother.  Lung cancer: Mother.  TIA - Transient ischaemic attack: Brother.  Immunizations  Vaccine Date Status Comments   COVID-19 MRNA, LNP-S, PF- Pfizer 12/13/2021 Recorded    COVID-19 MRNA, LNP-S, PF- Pfizer 03/31/2021 Recorded    COVID-19 MRNA, LNP-S, PF- Pfizer 03/10/2021 Recorded    influenza virus vaccine, inactivated 01/06/2021 Given    influenza virus vaccine, inactivated 10/23/2019 Given    pneumococcal 23-polyvalent vaccine 03/19/2018 Given    pneumococcal 13-valent conjugate vaccine 03/14/2017 Recorded    influenza virus vaccine, inactivated 11/15/2013 Recorded    influenza virus vaccine, inactivated 12/13/2011 Recorded 2022-01-13: UNKNOWN CAMPAIGNID   Health Maintenance  Health Maintenance  ???Pending?(in the next year)  ??? ??OverDue  ??? ? ? ?Influenza Vaccine due??10/01/21??and every 1??day(s)  ??? ? ? ?Advance Directive due??01/02/22??and every 1??year(s)  ??? ? ? ?Cognitive Screening due??01/02/22??and every 1??year(s)  ??? ? ? ?Fall Risk Assessment due??01/02/22??and every 1??year(s)  ??? ? ? ?Functional Assessment due??01/02/22??and every 1??year(s)  ??? ? ? ?Hypertension Management-BMP due??01/06/22??and every 1??year(s)  ??? ??Due?  ??? ? ? ?Alcohol Misuse Screening due??01/02/22??and every 1??year(s)  ??? ? ? ?ADL Screening due??01/14/22??and every 1??year(s)  ??? ? ? ?Aspirin Therapy for CVD  Prevention due??01/14/22??and every 1??year(s)  ??? ? ? ?Hypertension Management-Education due??01/14/22??and every 1??year(s)  ??? ? ? ?Tetanus Vaccine due??01/14/22??and every 10??year(s)  ??? ? ? ?Zoster Vaccine due??01/14/22??Unknown Frequency  ??? ??Due In Future?  ??? ? ? ?Obesity Screening not due until??01/01/23??and every 1??year(s)  ???Satisfied?(in the past 1 year)  ??? ??Satisfied?  ??? ? ? ?Blood Pressure Screening on??01/14/22.??Satisfied by Summer Rivera CMA.  ??? ? ? ?Body Mass Index Check on??01/14/22.??Satisfied by Summer Rivera CMA.  ??? ? ? ?Bone Density Screening on??02/11/21.??Satisfied by Dorota Colbert  ??? ? ? ?Breast Cancer Screening on??02/23/21.??Satisfied by Geraldine Tavarez  ??? ? ? ?Hypertension Management-Blood Pressure on??01/14/22.??Satisfied by Summer Rivera CMA  ??? ? ? ?Influenza Vaccine on??01/14/22.??Satisfied by Isaac Brian MD  ??? ? ? ?Medicare Annual Wellness Exam on??01/14/22.??Satisfied by Isaac Brian MD  ??? ? ? ?Obesity Screening on??01/14/22.??Satisfied by Summer Rivera CMA  ?

## 2022-05-26 ENCOUNTER — OFFICE VISIT (OUTPATIENT)
Dept: HEMATOLOGY/ONCOLOGY | Facility: CLINIC | Age: 72
End: 2022-05-26
Payer: MEDICARE

## 2022-05-26 VITALS
HEIGHT: 66 IN | WEIGHT: 178 LBS | HEART RATE: 73 BPM | DIASTOLIC BLOOD PRESSURE: 79 MMHG | TEMPERATURE: 98 F | OXYGEN SATURATION: 98 % | BODY MASS INDEX: 28.61 KG/M2 | SYSTOLIC BLOOD PRESSURE: 146 MMHG

## 2022-05-26 DIAGNOSIS — D47.3 ESSENTIAL THROMBOCYTOSIS: ICD-10-CM

## 2022-05-26 DIAGNOSIS — Z51.81 THERAPEUTIC DRUG MONITORING: ICD-10-CM

## 2022-05-26 DIAGNOSIS — D72.819 LEUKOPENIA, UNSPECIFIED TYPE: ICD-10-CM

## 2022-05-26 DIAGNOSIS — D64.9 ANEMIA, UNSPECIFIED TYPE: ICD-10-CM

## 2022-05-26 PROBLEM — D75.839 THROMBOCYTOSIS: Status: ACTIVE | Noted: 2022-05-26

## 2022-05-26 PROCEDURE — 1160F RVW MEDS BY RX/DR IN RCRD: CPT | Mod: CPTII,S$GLB,, | Performed by: INTERNAL MEDICINE

## 2022-05-26 PROCEDURE — 3008F PR BODY MASS INDEX (BMI) DOCUMENTED: ICD-10-PCS | Mod: CPTII,S$GLB,, | Performed by: INTERNAL MEDICINE

## 2022-05-26 PROCEDURE — 99215 PR OFFICE/OUTPT VISIT, EST, LEVL V, 40-54 MIN: ICD-10-PCS | Mod: S$GLB,,, | Performed by: INTERNAL MEDICINE

## 2022-05-26 PROCEDURE — 1159F MED LIST DOCD IN RCRD: CPT | Mod: CPTII,S$GLB,, | Performed by: INTERNAL MEDICINE

## 2022-05-26 PROCEDURE — 3078F DIAST BP <80 MM HG: CPT | Mod: CPTII,S$GLB,, | Performed by: INTERNAL MEDICINE

## 2022-05-26 PROCEDURE — 99999 PR PBB SHADOW E&M-EST. PATIENT-LVL III: CPT | Mod: PBBFAC,,, | Performed by: INTERNAL MEDICINE

## 2022-05-26 PROCEDURE — 3288F PR FALLS RISK ASSESSMENT DOCUMENTED: ICD-10-PCS | Mod: CPTII,S$GLB,, | Performed by: INTERNAL MEDICINE

## 2022-05-26 PROCEDURE — 1160F PR REVIEW ALL MEDS BY PRESCRIBER/CLIN PHARMACIST DOCUMENTED: ICD-10-PCS | Mod: CPTII,S$GLB,, | Performed by: INTERNAL MEDICINE

## 2022-05-26 PROCEDURE — 3008F BODY MASS INDEX DOCD: CPT | Mod: CPTII,S$GLB,, | Performed by: INTERNAL MEDICINE

## 2022-05-26 PROCEDURE — 3288F FALL RISK ASSESSMENT DOCD: CPT | Mod: CPTII,S$GLB,, | Performed by: INTERNAL MEDICINE

## 2022-05-26 PROCEDURE — 1101F PT FALLS ASSESS-DOCD LE1/YR: CPT | Mod: CPTII,S$GLB,, | Performed by: INTERNAL MEDICINE

## 2022-05-26 PROCEDURE — 3077F PR MOST RECENT SYSTOLIC BLOOD PRESSURE >= 140 MM HG: ICD-10-PCS | Mod: CPTII,S$GLB,, | Performed by: INTERNAL MEDICINE

## 2022-05-26 PROCEDURE — 4010F ACE/ARB THERAPY RXD/TAKEN: CPT | Mod: CPTII,S$GLB,, | Performed by: INTERNAL MEDICINE

## 2022-05-26 PROCEDURE — 1126F PR PAIN SEVERITY QUANTIFIED, NO PAIN PRESENT: ICD-10-PCS | Mod: CPTII,S$GLB,, | Performed by: INTERNAL MEDICINE

## 2022-05-26 PROCEDURE — 3078F PR MOST RECENT DIASTOLIC BLOOD PRESSURE < 80 MM HG: ICD-10-PCS | Mod: CPTII,S$GLB,, | Performed by: INTERNAL MEDICINE

## 2022-05-26 PROCEDURE — 1101F PR PT FALLS ASSESS DOC 0-1 FALLS W/OUT INJ PAST YR: ICD-10-PCS | Mod: CPTII,S$GLB,, | Performed by: INTERNAL MEDICINE

## 2022-05-26 PROCEDURE — 3077F SYST BP >= 140 MM HG: CPT | Mod: CPTII,S$GLB,, | Performed by: INTERNAL MEDICINE

## 2022-05-26 PROCEDURE — 4010F PR ACE/ARB THEARPY RXD/TAKEN: ICD-10-PCS | Mod: CPTII,S$GLB,, | Performed by: INTERNAL MEDICINE

## 2022-05-26 PROCEDURE — 99999 PR PBB SHADOW E&M-EST. PATIENT-LVL III: ICD-10-PCS | Mod: PBBFAC,,, | Performed by: INTERNAL MEDICINE

## 2022-05-26 PROCEDURE — 1159F PR MEDICATION LIST DOCUMENTED IN MEDICAL RECORD: ICD-10-PCS | Mod: CPTII,S$GLB,, | Performed by: INTERNAL MEDICINE

## 2022-05-26 PROCEDURE — 99215 OFFICE O/P EST HI 40 MIN: CPT | Mod: S$GLB,,, | Performed by: INTERNAL MEDICINE

## 2022-05-26 PROCEDURE — 1126F AMNT PAIN NOTED NONE PRSNT: CPT | Mod: CPTII,S$GLB,, | Performed by: INTERNAL MEDICINE

## 2022-05-26 RX ORDER — HYDROXYUREA 500 MG/1
500 CAPSULE ORAL 2 TIMES DAILY
COMMUNITY
Start: 2022-04-29 | End: 2022-07-18

## 2022-05-26 RX ORDER — ASPIRIN 81 MG/1
TABLET ORAL DAILY
COMMUNITY
Start: 2022-03-28

## 2022-05-26 RX ORDER — ATORVASTATIN CALCIUM 20 MG/1
20 TABLET, FILM COATED ORAL DAILY
COMMUNITY
Start: 2022-04-29 | End: 2022-10-31

## 2022-05-26 RX ORDER — BENAZEPRIL HYDROCHLORIDE 40 MG/1
40 TABLET ORAL DAILY
COMMUNITY
Start: 2022-04-29 | End: 2022-10-31

## 2022-05-26 RX ORDER — ONDANSETRON HYDROCHLORIDE 8 MG/1
TABLET, FILM COATED ORAL
COMMUNITY
Start: 2022-04-28 | End: 2022-06-23 | Stop reason: SDUPTHER

## 2022-05-26 RX ORDER — ESCITALOPRAM OXALATE 20 MG/1
10 TABLET ORAL DAILY
COMMUNITY
Start: 2022-04-29 | End: 2022-12-01

## 2022-05-26 RX ORDER — AMLODIPINE BESYLATE 10 MG/1
TABLET ORAL
COMMUNITY
Start: 2022-01-14 | End: 2022-09-07

## 2022-05-26 NOTE — PROGRESS NOTES
HEMATOLOGY/ONCOLOGY OFFICE CLINIC VISIT    Visit Information:  Visit type:  On-going care, Review/discussion of tests, Scheduled follow-up.    Accompanied by:  No one.    Source of history:  Self.    Referral source:  Roc GIFFORD, Isaac HUANG    History limitation:  None.        Initial Consultation: 3/28/2022  Referring Physician: Dr Brian  Other Physicians:  Code Status: Not addressed    Diagnosis/Problem list:   Essential Thrombocytosis  --CALR mutation analysis, exon 9: Positive.    Present Treatment:  Hydrea 500 mg daily    Treatment history:    Hydrea       Imaging:      Pathology:  March 21, 2022 15:21 Peripheral blood, CALR mutation analysis, exon 9: Positive. 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 Peripheral blood, JAK2 V617F mutation analysis: Negative for JAK2 V617F.    CLINICAL HISTORY:       Patient: 72 years old female kindly referred for thrombocytosis.     Reviewing electronic on medical record it seems like her platelets were normal up to March 2018.  In March 2019 platelets were mildly elevated 496,009 slowly there were trending up with the most recent on 3/15/2022 of 1,012,000.  She is taking baby aspirin.    On March 21, 2022 15:21 Peripheral blood, CALR mutation analysis, exon 9: Positive. 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  Peripheral blood, JAK2 V617F mutation analysis: Negative for JAK2 V617F.    She denies any family history of blood disorders or malignancies.          Chief Complaint: Here for ET    Interval History:  Patient presents today for scheduled follow up appointment.  Taking Hydrea 500mg twice a day and baby ASA daily. She has been doing well and has had no new problems since last visit.  She denies any fever, chills, sweats.  No chest pain or shortness of breath.  No bleeding.  No recent infections or hospitalizations.  Reviewed blood work with her and thrombocytosis better but leukopenic and anemic.  Will adjust Hydrea and change it to  "daily rather than twice a day.    ROS:  All 14 points ROS taken and as per Interval History    Histories:  PMH/PSH/FH/SOCIAL/ALLERGIES AND MEDS REVIEWED AND UPDATED AS APPROPRIATE       Vitals:    05/26/22 0913   BP: (!) 146/79   Pulse: 73   Temp: 97.7 °F (36.5 °C)   SpO2: 98%   Weight: 80.7 kg (178 lb)   Height: 5' 6" (1.676 m)      Physical Exam  Vitals and nursing note reviewed.   Constitutional:       General: She is not in acute distress.     Appearance: Normal appearance. She is well-developed.   HENT:      Head: Normocephalic and atraumatic.      Mouth/Throat:      Mouth: Mucous membranes are moist.   Eyes:      General: No scleral icterus.     Extraocular Movements: Extraocular movements intact.      Conjunctiva/sclera: Conjunctivae normal.      Pupils: Pupils are equal, round, and reactive to light.   Neck:      Vascular: No JVD.   Cardiovascular:      Rate and Rhythm: Normal rate and regular rhythm.      Heart sounds: No murmur heard.  Pulmonary:      Effort: Pulmonary effort is normal.      Breath sounds: Normal breath sounds. No wheezing or rhonchi.   Chest:      Chest wall: No deformity or tenderness.   Breasts:      Right: No swelling, mass, nipple discharge, skin change, tenderness, axillary adenopathy or supraclavicular adenopathy.      Left: No swelling, mass, nipple discharge, skin change, tenderness, axillary adenopathy or supraclavicular adenopathy.       Abdominal:      General: Bowel sounds are normal. There is no distension.      Palpations: Abdomen is soft. There is no mass.      Tenderness: There is no abdominal tenderness.   Musculoskeletal:         General: No swelling or deformity.      Cervical back: Neck supple.   Lymphadenopathy:      Cervical: No cervical adenopathy.      Upper Body:      Right upper body: No supraclavicular or axillary adenopathy.      Left upper body: No supraclavicular or axillary adenopathy.      Lower Body: No right inguinal adenopathy. No left inguinal " adenopathy.   Skin:     General: Skin is warm.      Coloration: Skin is not jaundiced.      Findings: No lesion or rash.      Nails: There is no clubbing.   Neurological:      General: No focal deficit present.      Mental Status: She is alert and oriented to person, place, and time.      Cranial Nerves: Cranial nerves are intact.      Sensory: Sensation is intact.      Motor: Motor function is intact.      Gait: Gait is intact.   Psychiatric:         Attention and Perception: Attention normal.         Mood and Affect: Mood and affect normal.         Speech: Speech normal.         Behavior: Behavior is cooperative.         Thought Content: Thought content normal.         Cognition and Memory: Cognition normal.         Judgment: Judgment normal.       ECOG SCORE    0 - Fully active-able to carry on all pre-disease performance without restriction         Laboratory:  CBC with Differential:  Lab Results   Component Value Date    WBC 2.6 (L) 05/23/2022    RBC 3.15 (L) 05/23/2022    HGB 10.5 (L) 05/23/2022    HCT 32.2 (L) 05/23/2022    .2 (H) 05/23/2022    MCH 33.3 (H) 05/23/2022    MCHC 32.6 (L) 05/23/2022    RDW 22.0 (H) 05/23/2022     (H) 05/23/2022    MPV 9.0 (L) 05/23/2022        CMP:  Sodium Level   Date Value Ref Range Status   04/04/2022 144 136 - 145      Potassium Level   Date Value Ref Range Status   04/04/2022 4.8 3.5 - 5.1      Carbon Dioxide   Date Value Ref Range Status   04/04/2022 30 23 - 31      Blood Urea Nitrogen   Date Value Ref Range Status   04/04/2022 13.7 9.8 - 20.1      Creatinine   Date Value Ref Range Status   04/04/2022 0.71 0.57 - 1.11      Calcium Level Total   Date Value Ref Range Status   04/04/2022 9.8 8.4 - 10.2      Albumin Level   Date Value Ref Range Status   04/04/2022 4.2 3.4 - 4.8      Bilirubin Total   Date Value Ref Range Status   04/04/2022 0.3 0.2 - 1.2      Alkaline Phosphatase   Date Value Ref Range Status   04/04/2022 69 40 - 150      Aspartate  Aminotransferase   Date Value Ref Range Status   04/04/2022 23 5 - 34      Alanine Aminotransferase   Date Value Ref Range Status   04/04/2022 17 0 - 55      Estimated GFR-Non    Date Value Ref Range Status   04/04/2022 >60               Assessment:       1. Essential thrombocytosis    2. Therapeutic drug monitoring    3. Anemia, unspecified type    4. Leukopenia, unspecified type      Essential thrombocytosis -- CALR mutation positive, 52 bp deletion Exon 9   --on Hydrea  Anemia-due to therapy  Leukopenia-due to therapy        Plan:       Recommend decreasing  Hydrea 500 mg po daily , will adjust if needed  Cont baby ASA daily  CBC in 2 weeks   RTC 4 weeks with CBC, CMP  Encouraged to call for any questions or problems    The patient was given ample opportunity to ask questions and they were all answered to satisfaction; patient demonstrated understanding of what we discussed and is agreeable to the plan.    HANH QUEZADA MD      Professional Services   I, Lena Murrieta LPN, acted solely as a scribe for and in the presence of Dr. Hanh Quezada, who performed these services.

## 2022-06-09 ENCOUNTER — LAB VISIT (OUTPATIENT)
Dept: LAB | Facility: HOSPITAL | Age: 72
End: 2022-06-09
Attending: INTERNAL MEDICINE
Payer: MEDICARE

## 2022-06-09 DIAGNOSIS — Z51.81 THERAPEUTIC DRUG MONITORING: ICD-10-CM

## 2022-06-09 DIAGNOSIS — D64.9 ANEMIA, UNSPECIFIED TYPE: ICD-10-CM

## 2022-06-09 DIAGNOSIS — D72.819 LEUKOPENIA, UNSPECIFIED TYPE: ICD-10-CM

## 2022-06-09 DIAGNOSIS — D47.3 ESSENTIAL THROMBOCYTOSIS: ICD-10-CM

## 2022-06-09 LAB
BASOPHILS # BLD AUTO: 0.02 X10(3)/MCL (ref 0–0.2)
BASOPHILS NFR BLD AUTO: 0.6 %
EOSINOPHIL # BLD AUTO: 0.03 X10(3)/MCL (ref 0–0.9)
EOSINOPHIL NFR BLD AUTO: 0.9 %
ERYTHROCYTE [DISTWIDTH] IN BLOOD BY AUTOMATED COUNT: ABNORMAL %
HCT VFR BLD AUTO: 32.3 % (ref 37–47)
HGB BLD-MCNC: 10.6 GM/DL (ref 12–16)
IMM GRANULOCYTES # BLD AUTO: 0 X10(3)/MCL (ref 0–0.02)
IMM GRANULOCYTES NFR BLD AUTO: 0 % (ref 0–0.43)
LYMPHOCYTES # BLD AUTO: 1 X10(3)/MCL (ref 0.6–4.6)
LYMPHOCYTES NFR BLD AUTO: 30.7 %
MCH RBC QN AUTO: 33.9 PG (ref 27–31)
MCHC RBC AUTO-ENTMCNC: 32.8 MG/DL (ref 33–36)
MCV RBC AUTO: 103.2 FL (ref 80–94)
MONOCYTES # BLD AUTO: 0.35 X10(3)/MCL (ref 0.1–1.3)
MONOCYTES NFR BLD AUTO: 10.7 %
NEUTROPHILS # BLD AUTO: 1.9 X10(3)/MCL (ref 2.1–9.2)
NEUTROPHILS NFR BLD AUTO: 57.1 %
PLATELET # BLD AUTO: 557 X10(3)/MCL (ref 130–400)
PMV BLD AUTO: 8.7 FL (ref 9.4–12.4)
RBC # BLD AUTO: 3.13 X10(6)/MCL (ref 4.2–5.4)
WBC # SPEC AUTO: 3.3 X10(3)/MCL (ref 4.5–11.5)

## 2022-06-09 PROCEDURE — 85025 COMPLETE CBC W/AUTO DIFF WBC: CPT

## 2022-06-09 PROCEDURE — 36415 COLL VENOUS BLD VENIPUNCTURE: CPT

## 2022-06-23 ENCOUNTER — LAB VISIT (OUTPATIENT)
Dept: LAB | Facility: HOSPITAL | Age: 72
End: 2022-06-23
Attending: INTERNAL MEDICINE
Payer: MEDICARE

## 2022-06-23 ENCOUNTER — OFFICE VISIT (OUTPATIENT)
Dept: HEMATOLOGY/ONCOLOGY | Facility: CLINIC | Age: 72
End: 2022-06-23
Payer: MEDICARE

## 2022-06-23 VITALS
WEIGHT: 178 LBS | HEIGHT: 66 IN | BODY MASS INDEX: 28.61 KG/M2 | SYSTOLIC BLOOD PRESSURE: 154 MMHG | HEART RATE: 72 BPM | TEMPERATURE: 98 F | OXYGEN SATURATION: 98 % | DIASTOLIC BLOOD PRESSURE: 85 MMHG

## 2022-06-23 DIAGNOSIS — R11.0 NAUSEA: ICD-10-CM

## 2022-06-23 DIAGNOSIS — D64.9 ANEMIA, UNSPECIFIED TYPE: ICD-10-CM

## 2022-06-23 DIAGNOSIS — D72.819 LEUKOPENIA, UNSPECIFIED TYPE: ICD-10-CM

## 2022-06-23 DIAGNOSIS — D47.3 ESSENTIAL THROMBOCYTOSIS: ICD-10-CM

## 2022-06-23 DIAGNOSIS — Z51.81 THERAPEUTIC DRUG MONITORING: ICD-10-CM

## 2022-06-23 LAB
ALBUMIN SERPL-MCNC: 4.3 GM/DL (ref 3.4–4.8)
ALBUMIN/GLOB SERPL: 1.6 RATIO (ref 1.1–2)
ALP SERPL-CCNC: 74 UNIT/L (ref 40–150)
ALT SERPL-CCNC: 15 UNIT/L (ref 0–55)
AST SERPL-CCNC: 23 UNIT/L (ref 5–34)
BASOPHILS # BLD AUTO: 0.03 X10(3)/MCL (ref 0–0.2)
BASOPHILS NFR BLD AUTO: 0.8 %
BILIRUBIN DIRECT+TOT PNL SERPL-MCNC: 0.5 MG/DL
BUN SERPL-MCNC: 15.9 MG/DL (ref 9.8–20.1)
CALCIUM SERPL-MCNC: 10.2 MG/DL (ref 8.4–10.2)
CHLORIDE SERPL-SCNC: 105 MMOL/L (ref 98–107)
CO2 SERPL-SCNC: 28 MMOL/L (ref 23–31)
CREAT SERPL-MCNC: 0.67 MG/DL (ref 0.55–1.02)
EOSINOPHIL # BLD AUTO: 0.03 X10(3)/MCL (ref 0–0.9)
EOSINOPHIL NFR BLD AUTO: 0.8 %
ERYTHROCYTE [DISTWIDTH] IN BLOOD BY AUTOMATED COUNT: 18.4 % (ref 11.5–17)
GLOBULIN SER-MCNC: 2.7 GM/DL (ref 2.4–3.5)
GLUCOSE SERPL-MCNC: 91 MG/DL (ref 82–115)
HCT VFR BLD AUTO: 31.7 % (ref 37–47)
HGB BLD-MCNC: 10.8 GM/DL (ref 12–16)
IMM GRANULOCYTES # BLD AUTO: 0.02 X10(3)/MCL (ref 0–0.02)
IMM GRANULOCYTES NFR BLD AUTO: 0.5 % (ref 0–0.43)
LYMPHOCYTES # BLD AUTO: 1.12 X10(3)/MCL (ref 0.6–4.6)
LYMPHOCYTES NFR BLD AUTO: 28.1 %
MCH RBC QN AUTO: 35.4 PG (ref 27–31)
MCHC RBC AUTO-ENTMCNC: 34.1 MG/DL (ref 33–36)
MCV RBC AUTO: 103.9 FL (ref 80–94)
MONOCYTES # BLD AUTO: 0.3 X10(3)/MCL (ref 0.1–1.3)
MONOCYTES NFR BLD AUTO: 7.5 %
NEUTROPHILS # BLD AUTO: 2.5 X10(3)/MCL (ref 2.1–9.2)
NEUTROPHILS NFR BLD AUTO: 62.3 %
PLATELET # BLD AUTO: 612 X10(3)/MCL (ref 130–400)
PMV BLD AUTO: 8.4 FL (ref 9.4–12.4)
POTASSIUM SERPL-SCNC: 5.1 MMOL/L (ref 3.5–5.1)
PROT SERPL-MCNC: 7 GM/DL (ref 5.8–7.6)
RBC # BLD AUTO: 3.05 X10(6)/MCL (ref 4.2–5.4)
SODIUM SERPL-SCNC: 142 MMOL/L (ref 136–145)
WBC # SPEC AUTO: 4 X10(3)/MCL (ref 4.5–11.5)

## 2022-06-23 PROCEDURE — 99215 PR OFFICE/OUTPT VISIT, EST, LEVL V, 40-54 MIN: ICD-10-PCS | Mod: S$GLB,,, | Performed by: INTERNAL MEDICINE

## 2022-06-23 PROCEDURE — 36415 COLL VENOUS BLD VENIPUNCTURE: CPT

## 2022-06-23 PROCEDURE — 3077F SYST BP >= 140 MM HG: CPT | Mod: CPTII,S$GLB,, | Performed by: INTERNAL MEDICINE

## 2022-06-23 PROCEDURE — 1159F MED LIST DOCD IN RCRD: CPT | Mod: CPTII,S$GLB,, | Performed by: INTERNAL MEDICINE

## 2022-06-23 PROCEDURE — 99999 PR PBB SHADOW E&M-EST. PATIENT-LVL III: CPT | Mod: PBBFAC,,, | Performed by: INTERNAL MEDICINE

## 2022-06-23 PROCEDURE — 80053 COMPREHEN METABOLIC PANEL: CPT

## 2022-06-23 PROCEDURE — 99999 PR PBB SHADOW E&M-EST. PATIENT-LVL III: ICD-10-PCS | Mod: PBBFAC,,, | Performed by: INTERNAL MEDICINE

## 2022-06-23 PROCEDURE — 3079F DIAST BP 80-89 MM HG: CPT | Mod: CPTII,S$GLB,, | Performed by: INTERNAL MEDICINE

## 2022-06-23 PROCEDURE — 1126F PR PAIN SEVERITY QUANTIFIED, NO PAIN PRESENT: ICD-10-PCS | Mod: CPTII,S$GLB,, | Performed by: INTERNAL MEDICINE

## 2022-06-23 PROCEDURE — 3077F PR MOST RECENT SYSTOLIC BLOOD PRESSURE >= 140 MM HG: ICD-10-PCS | Mod: CPTII,S$GLB,, | Performed by: INTERNAL MEDICINE

## 2022-06-23 PROCEDURE — 1160F RVW MEDS BY RX/DR IN RCRD: CPT | Mod: CPTII,S$GLB,, | Performed by: INTERNAL MEDICINE

## 2022-06-23 PROCEDURE — 1101F PR PT FALLS ASSESS DOC 0-1 FALLS W/OUT INJ PAST YR: ICD-10-PCS | Mod: CPTII,S$GLB,, | Performed by: INTERNAL MEDICINE

## 2022-06-23 PROCEDURE — 4010F ACE/ARB THERAPY RXD/TAKEN: CPT | Mod: CPTII,S$GLB,, | Performed by: INTERNAL MEDICINE

## 2022-06-23 PROCEDURE — 1101F PT FALLS ASSESS-DOCD LE1/YR: CPT | Mod: CPTII,S$GLB,, | Performed by: INTERNAL MEDICINE

## 2022-06-23 PROCEDURE — 99215 OFFICE O/P EST HI 40 MIN: CPT | Mod: S$GLB,,, | Performed by: INTERNAL MEDICINE

## 2022-06-23 PROCEDURE — 1126F AMNT PAIN NOTED NONE PRSNT: CPT | Mod: CPTII,S$GLB,, | Performed by: INTERNAL MEDICINE

## 2022-06-23 PROCEDURE — 1159F PR MEDICATION LIST DOCUMENTED IN MEDICAL RECORD: ICD-10-PCS | Mod: CPTII,S$GLB,, | Performed by: INTERNAL MEDICINE

## 2022-06-23 PROCEDURE — 85025 COMPLETE CBC W/AUTO DIFF WBC: CPT

## 2022-06-23 PROCEDURE — 3079F PR MOST RECENT DIASTOLIC BLOOD PRESSURE 80-89 MM HG: ICD-10-PCS | Mod: CPTII,S$GLB,, | Performed by: INTERNAL MEDICINE

## 2022-06-23 PROCEDURE — 3008F PR BODY MASS INDEX (BMI) DOCUMENTED: ICD-10-PCS | Mod: CPTII,S$GLB,, | Performed by: INTERNAL MEDICINE

## 2022-06-23 PROCEDURE — 3288F FALL RISK ASSESSMENT DOCD: CPT | Mod: CPTII,S$GLB,, | Performed by: INTERNAL MEDICINE

## 2022-06-23 PROCEDURE — 3008F BODY MASS INDEX DOCD: CPT | Mod: CPTII,S$GLB,, | Performed by: INTERNAL MEDICINE

## 2022-06-23 PROCEDURE — 1160F PR REVIEW ALL MEDS BY PRESCRIBER/CLIN PHARMACIST DOCUMENTED: ICD-10-PCS | Mod: CPTII,S$GLB,, | Performed by: INTERNAL MEDICINE

## 2022-06-23 PROCEDURE — 3288F PR FALLS RISK ASSESSMENT DOCUMENTED: ICD-10-PCS | Mod: CPTII,S$GLB,, | Performed by: INTERNAL MEDICINE

## 2022-06-23 PROCEDURE — 4010F PR ACE/ARB THEARPY RXD/TAKEN: ICD-10-PCS | Mod: CPTII,S$GLB,, | Performed by: INTERNAL MEDICINE

## 2022-06-23 RX ORDER — ONDANSETRON HYDROCHLORIDE 8 MG/1
TABLET, FILM COATED ORAL
Status: CANCELLED | OUTPATIENT
Start: 2022-06-23

## 2022-06-23 RX ORDER — ONDANSETRON HYDROCHLORIDE 8 MG/1
TABLET, FILM COATED ORAL
Qty: 15 TABLET | Refills: 2 | Status: SHIPPED | OUTPATIENT
Start: 2022-06-23 | End: 2022-11-28

## 2022-06-23 NOTE — PROGRESS NOTES
HEMATOLOGY/ONCOLOGY OFFICE CLINIC VISIT    Visit Information:  Visit type:  On-going care, Review/discussion of tests, Scheduled follow-up.    Accompanied by:  No one.    Source of history:  Self.    Referral source:  Roc GIFFORD, Isaac HUANG    History limitation:  None.        Initial Consultation: 3/28/2022  Referring Physician: Dr Brian  Other Physicians:  Code Status: Not addressed    Diagnosis/Problem list:   Essential Thrombocytosis  --CALR mutation analysis, exon 9: Positive.    Present Treatment:  Hydrea 500 mg daily    Treatment history:    Hydrea       Imaging:      Pathology:  March 21, 2022 15:21 Peripheral blood, CALR mutation analysis, exon 9: Positive. 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 Peripheral blood, JAK2 V617F mutation analysis: Negative for JAK2 V617F.    CLINICAL HISTORY:       Patient: 72 years old female kindly referred for thrombocytosis.     Reviewing electronic on medical record it seems like her platelets were normal up to March 2018.  In March 2019 platelets were mildly elevated 496,009 slowly there were trending up with the most recent on 3/15/2022 of 1,012,000.  She is taking baby aspirin.    On March 21, 2022 15:21 Peripheral blood, CALR mutation analysis, exon 9: Positive. 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  Peripheral blood, JAK2 V617F mutation analysis: Negative for JAK2 V617F.    She denies any family history of blood disorders or malignancies.          Chief Complaint: Here for ET    Interval History:  Patient presents today for follow up appointment to discuss lab results.  Taking Hydrea 500mg daily and baby ASA daily. She has been having occasional nausea, but no vomiting. Overall, she has been doing well.  She denies any fever, chills, sweats.  No chest pain or shortness of breath.  No bleeding.      ROS:  All 14 points ROS taken and as per Interval History    Histories:  PMH/PSH/FH/SOCIAL/ALLERGIES AND MEDS REVIEWED AND UPDATED  "AS APPROPRIATE       Vitals:    06/23/22 1128   BP: (!) 154/85   BP Location: Left arm   Patient Position: Sitting   Pulse: 72   Temp: 97.6 °F (36.4 °C)   SpO2: 98%   Weight: 80.7 kg (178 lb)   Height: 5' 6" (1.676 m)      Physical Exam  Vitals and nursing note reviewed.   Constitutional:       General: She is not in acute distress.     Appearance: Normal appearance. She is well-developed.   HENT:      Head: Normocephalic and atraumatic.      Mouth/Throat:      Mouth: Mucous membranes are moist.   Eyes:      General: No scleral icterus.     Extraocular Movements: Extraocular movements intact.      Conjunctiva/sclera: Conjunctivae normal.      Pupils: Pupils are equal, round, and reactive to light.   Neck:      Vascular: No JVD.   Cardiovascular:      Rate and Rhythm: Normal rate and regular rhythm.      Heart sounds: No murmur heard.  Pulmonary:      Effort: Pulmonary effort is normal.      Breath sounds: Normal breath sounds. No wheezing or rhonchi.   Chest:      Chest wall: No deformity or tenderness.   Breasts:      Right: No swelling, mass, nipple discharge, skin change, tenderness, axillary adenopathy or supraclavicular adenopathy.      Left: No swelling, mass, nipple discharge, skin change, tenderness, axillary adenopathy or supraclavicular adenopathy.       Abdominal:      General: Bowel sounds are normal. There is no distension.      Palpations: Abdomen is soft. There is no mass.      Tenderness: There is no abdominal tenderness.   Musculoskeletal:         General: No swelling or deformity.      Cervical back: Neck supple.   Lymphadenopathy:      Cervical: No cervical adenopathy.      Upper Body:      Right upper body: No supraclavicular or axillary adenopathy.      Left upper body: No supraclavicular or axillary adenopathy.      Lower Body: No right inguinal adenopathy. No left inguinal adenopathy.   Skin:     General: Skin is warm.      Coloration: Skin is not jaundiced.      Findings: No lesion or rash. "      Nails: There is no clubbing.   Neurological:      General: No focal deficit present.      Mental Status: She is alert and oriented to person, place, and time.      Sensory: Sensation is intact.      Motor: Motor function is intact.      Gait: Gait is intact.   Psychiatric:         Attention and Perception: Attention normal.         Mood and Affect: Mood and affect normal.         Speech: Speech normal.         Behavior: Behavior is cooperative.         Thought Content: Thought content normal.         Cognition and Memory: Cognition normal.         Judgment: Judgment normal.       ECOG SCORE           Laboratory:  CBC with Differential:  Lab Results   Component Value Date    WBC 4.0 (L) 06/23/2022    RBC 3.05 (L) 06/23/2022    HGB 10.8 (L) 06/23/2022    HCT 31.7 (L) 06/23/2022    .9 (H) 06/23/2022    MCH 35.4 (H) 06/23/2022    MCHC 34.1 06/23/2022    RDW 18.4 (H) 06/23/2022     (H) 06/23/2022    MPV 8.4 (L) 06/23/2022        CMP:  Sodium Level   Date Value Ref Range Status   06/23/2022 142 136 - 145 mmol/L Final     Potassium Level   Date Value Ref Range Status   06/23/2022 5.1 3.5 - 5.1 mmol/L Final     Carbon Dioxide   Date Value Ref Range Status   06/23/2022 28 23 - 31 mmol/L Final     Blood Urea Nitrogen   Date Value Ref Range Status   06/23/2022 15.9 9.8 - 20.1 mg/dL Final     Creatinine   Date Value Ref Range Status   06/23/2022 0.67 0.55 - 1.02 mg/dL Final     Calcium Level Total   Date Value Ref Range Status   06/23/2022 10.2 8.4 - 10.2 mg/dL Final     Albumin Level   Date Value Ref Range Status   06/23/2022 4.3 3.4 - 4.8 gm/dL Final     Bilirubin Total   Date Value Ref Range Status   06/23/2022 0.5 <=1.5 mg/dL Final     Alkaline Phosphatase   Date Value Ref Range Status   06/23/2022 74 40 - 150 unit/L Final     Aspartate Aminotransferase   Date Value Ref Range Status   06/23/2022 23 5 - 34 unit/L Final     Alanine Aminotransferase   Date Value Ref Range Status   06/23/2022 15 0 - 55 unit/L  Final     Estimated GFR-Non    Date Value Ref Range Status   06/23/2022 >60 mls/min/1.73/m2 Final             Assessment:       1. Nausea    2. Essential thrombocytosis    3. Leukopenia, unspecified type    4. Anemia, unspecified type      Essential thrombocytosis -- CALR mutation positive, 52 bp deletion Exon 9   --on Hydrea  Anemia-due to therapy  Leukopenia-due to therapy        Plan:       Recommend increasing Hydrea 500 mg po BID on Monday-Friday and daily on Saturday and Sunday, will adjust if needed  Cont ASA 81 mg daily  Will send prescription for Zofran to pharmacy for nausea  CBC, CMP, LDH in 2 weeks  RTC 4 weeks with CBC only  Encouraged to call for any questions or problems    The patient was given ample opportunity to ask questions and they were all answered to satisfaction; patient demonstrated understanding of what we discussed and is agreeable to the plan.    HANH QUEZADA MD      Professional Services   I, Lena Murrieta LPN, acted solely as a scribe for and in the presence of Dr. Hanh Quezada, who performed these services.

## 2022-07-07 ENCOUNTER — LAB VISIT (OUTPATIENT)
Dept: LAB | Facility: HOSPITAL | Age: 72
End: 2022-07-07
Attending: INTERNAL MEDICINE
Payer: MEDICARE

## 2022-07-07 DIAGNOSIS — D64.9 ANEMIA, UNSPECIFIED TYPE: ICD-10-CM

## 2022-07-07 DIAGNOSIS — D47.3 ESSENTIAL THROMBOCYTOSIS: ICD-10-CM

## 2022-07-07 DIAGNOSIS — D72.819 LEUKOPENIA, UNSPECIFIED TYPE: ICD-10-CM

## 2022-07-07 LAB
ALBUMIN SERPL-MCNC: 4.1 GM/DL (ref 3.4–4.8)
ALBUMIN/GLOB SERPL: 1.6 RATIO (ref 1.1–2)
ALP SERPL-CCNC: 71 UNIT/L (ref 40–150)
ALT SERPL-CCNC: 15 UNIT/L (ref 0–55)
AST SERPL-CCNC: 20 UNIT/L (ref 5–34)
BASOPHILS # BLD AUTO: 0.03 X10(3)/MCL (ref 0–0.2)
BASOPHILS NFR BLD AUTO: 1 %
BILIRUBIN DIRECT+TOT PNL SERPL-MCNC: 0.6 MG/DL
BUN SERPL-MCNC: 17.6 MG/DL (ref 9.8–20.1)
CALCIUM SERPL-MCNC: 9.3 MG/DL (ref 8.4–10.2)
CHLORIDE SERPL-SCNC: 109 MMOL/L (ref 98–107)
CO2 SERPL-SCNC: 27 MMOL/L (ref 23–31)
CREAT SERPL-MCNC: 0.66 MG/DL (ref 0.55–1.02)
EOSINOPHIL # BLD AUTO: 0.04 X10(3)/MCL (ref 0–0.9)
EOSINOPHIL NFR BLD AUTO: 1.3 %
ERYTHROCYTE [DISTWIDTH] IN BLOOD BY AUTOMATED COUNT: 15.8 % (ref 11.5–17)
GLOBULIN SER-MCNC: 2.6 GM/DL (ref 2.4–3.5)
GLUCOSE SERPL-MCNC: 95 MG/DL (ref 82–115)
HCT VFR BLD AUTO: 33.3 % (ref 37–47)
HGB BLD-MCNC: 10.9 GM/DL (ref 12–16)
IMM GRANULOCYTES # BLD AUTO: 0.01 X10(3)/MCL (ref 0–0.04)
IMM GRANULOCYTES NFR BLD AUTO: 0.3 %
LDH SERPL-CCNC: 239 U/L (ref 125–220)
LYMPHOCYTES # BLD AUTO: 0.8 X10(3)/MCL (ref 0.6–4.6)
LYMPHOCYTES NFR BLD AUTO: 26 %
MCH RBC QN AUTO: 35.4 PG (ref 27–31)
MCHC RBC AUTO-ENTMCNC: 32.7 MG/DL (ref 33–36)
MCV RBC AUTO: 108.1 FL (ref 80–94)
MONOCYTES # BLD AUTO: 0.27 X10(3)/MCL (ref 0.1–1.3)
MONOCYTES NFR BLD AUTO: 8.8 %
NEUTROPHILS # BLD AUTO: 1.9 X10(3)/MCL (ref 2.1–9.2)
NEUTROPHILS NFR BLD AUTO: 62.6 %
PLATELET # BLD AUTO: 623 X10(3)/MCL (ref 130–400)
PMV BLD AUTO: 8.9 FL (ref 7.4–10.4)
POTASSIUM SERPL-SCNC: 4.6 MMOL/L (ref 3.5–5.1)
PROT SERPL-MCNC: 6.7 GM/DL (ref 5.8–7.6)
RBC # BLD AUTO: 3.08 X10(6)/MCL (ref 4.2–5.4)
SODIUM SERPL-SCNC: 142 MMOL/L (ref 136–145)
WBC # SPEC AUTO: 3.1 X10(3)/MCL (ref 4.5–11.5)

## 2022-07-07 PROCEDURE — 80053 COMPREHEN METABOLIC PANEL: CPT

## 2022-07-07 PROCEDURE — 85025 COMPLETE CBC W/AUTO DIFF WBC: CPT

## 2022-07-07 PROCEDURE — 36415 COLL VENOUS BLD VENIPUNCTURE: CPT

## 2022-07-07 PROCEDURE — 83615 LACTATE (LD) (LDH) ENZYME: CPT

## 2022-07-20 ENCOUNTER — PATIENT OUTREACH (OUTPATIENT)
Dept: ADMINISTRATIVE | Facility: HOSPITAL | Age: 72
End: 2022-07-20
Payer: MEDICARE

## 2022-07-20 NOTE — PROGRESS NOTES
Population Health Outreach.Records Received, hyper-linked into chart at this time. The following record(s)  below were uploaded for Health Maintenance .    2/15/2022-mammogram  9/30/2019-COLONOSCOPY

## 2022-07-21 ENCOUNTER — OFFICE VISIT (OUTPATIENT)
Dept: HEMATOLOGY/ONCOLOGY | Facility: CLINIC | Age: 72
End: 2022-07-21
Payer: MEDICARE

## 2022-07-21 ENCOUNTER — LAB VISIT (OUTPATIENT)
Dept: LAB | Facility: HOSPITAL | Age: 72
End: 2022-07-21
Attending: INTERNAL MEDICINE
Payer: MEDICARE

## 2022-07-21 VITALS
SYSTOLIC BLOOD PRESSURE: 148 MMHG | OXYGEN SATURATION: 100 % | TEMPERATURE: 97 F | WEIGHT: 181 LBS | DIASTOLIC BLOOD PRESSURE: 79 MMHG | HEART RATE: 63 BPM | HEIGHT: 66 IN | BODY MASS INDEX: 29.09 KG/M2

## 2022-07-21 DIAGNOSIS — D72.819 LEUKOPENIA, UNSPECIFIED TYPE: Primary | ICD-10-CM

## 2022-07-21 DIAGNOSIS — D47.3 ESSENTIAL THROMBOCYTOSIS: ICD-10-CM

## 2022-07-21 DIAGNOSIS — D72.819 LEUKOPENIA, UNSPECIFIED TYPE: ICD-10-CM

## 2022-07-21 DIAGNOSIS — D64.9 ANEMIA, UNSPECIFIED TYPE: ICD-10-CM

## 2022-07-21 LAB
BASOPHILS # BLD AUTO: 0.05 X10(3)/MCL (ref 0–0.2)
BASOPHILS NFR BLD AUTO: 1.5 %
EOSINOPHIL # BLD AUTO: 0.03 X10(3)/MCL (ref 0–0.9)
EOSINOPHIL NFR BLD AUTO: 0.9 %
ERYTHROCYTE [DISTWIDTH] IN BLOOD BY AUTOMATED COUNT: 13.7 % (ref 11.5–17)
HCT VFR BLD AUTO: 33.7 % (ref 37–47)
HGB BLD-MCNC: 11 GM/DL (ref 12–16)
IMM GRANULOCYTES # BLD AUTO: 0 X10(3)/MCL (ref 0–0.04)
IMM GRANULOCYTES NFR BLD AUTO: 0 %
LYMPHOCYTES # BLD AUTO: 0.85 X10(3)/MCL (ref 0.6–4.6)
LYMPHOCYTES NFR BLD AUTO: 24.8 %
MCH RBC QN AUTO: 35.9 PG (ref 27–31)
MCHC RBC AUTO-ENTMCNC: 32.6 MG/DL (ref 33–36)
MCV RBC AUTO: 110.1 FL (ref 80–94)
MONOCYTES # BLD AUTO: 0.33 X10(3)/MCL (ref 0.1–1.3)
MONOCYTES NFR BLD AUTO: 9.6 %
NEUTROPHILS # BLD AUTO: 2.2 X10(3)/MCL (ref 2.1–9.2)
NEUTROPHILS NFR BLD AUTO: 63.2 %
PLATELET # BLD AUTO: 646 X10(3)/MCL (ref 130–400)
PMV BLD AUTO: 9.1 FL (ref 7.4–10.4)
RBC # BLD AUTO: 3.06 X10(6)/MCL (ref 4.2–5.4)
WBC # SPEC AUTO: 3.4 X10(3)/MCL (ref 4.5–11.5)

## 2022-07-21 PROCEDURE — 99999 PR PBB SHADOW E&M-EST. PATIENT-LVL III: CPT | Mod: PBBFAC,,, | Performed by: INTERNAL MEDICINE

## 2022-07-21 PROCEDURE — 3078F DIAST BP <80 MM HG: CPT | Mod: CPTII,S$GLB,, | Performed by: INTERNAL MEDICINE

## 2022-07-21 PROCEDURE — 3008F BODY MASS INDEX DOCD: CPT | Mod: CPTII,S$GLB,, | Performed by: INTERNAL MEDICINE

## 2022-07-21 PROCEDURE — 3078F PR MOST RECENT DIASTOLIC BLOOD PRESSURE < 80 MM HG: ICD-10-PCS | Mod: CPTII,S$GLB,, | Performed by: INTERNAL MEDICINE

## 2022-07-21 PROCEDURE — 1159F PR MEDICATION LIST DOCUMENTED IN MEDICAL RECORD: ICD-10-PCS | Mod: CPTII,S$GLB,, | Performed by: INTERNAL MEDICINE

## 2022-07-21 PROCEDURE — 99999 PR PBB SHADOW E&M-EST. PATIENT-LVL III: ICD-10-PCS | Mod: PBBFAC,,, | Performed by: INTERNAL MEDICINE

## 2022-07-21 PROCEDURE — 1159F MED LIST DOCD IN RCRD: CPT | Mod: CPTII,S$GLB,, | Performed by: INTERNAL MEDICINE

## 2022-07-21 PROCEDURE — 1160F PR REVIEW ALL MEDS BY PRESCRIBER/CLIN PHARMACIST DOCUMENTED: ICD-10-PCS | Mod: CPTII,S$GLB,, | Performed by: INTERNAL MEDICINE

## 2022-07-21 PROCEDURE — 99215 OFFICE O/P EST HI 40 MIN: CPT | Mod: S$GLB,,, | Performed by: INTERNAL MEDICINE

## 2022-07-21 PROCEDURE — 3288F FALL RISK ASSESSMENT DOCD: CPT | Mod: CPTII,S$GLB,, | Performed by: INTERNAL MEDICINE

## 2022-07-21 PROCEDURE — 4010F PR ACE/ARB THEARPY RXD/TAKEN: ICD-10-PCS | Mod: CPTII,S$GLB,, | Performed by: INTERNAL MEDICINE

## 2022-07-21 PROCEDURE — 1160F RVW MEDS BY RX/DR IN RCRD: CPT | Mod: CPTII,S$GLB,, | Performed by: INTERNAL MEDICINE

## 2022-07-21 PROCEDURE — 1101F PR PT FALLS ASSESS DOC 0-1 FALLS W/OUT INJ PAST YR: ICD-10-PCS | Mod: CPTII,S$GLB,, | Performed by: INTERNAL MEDICINE

## 2022-07-21 PROCEDURE — 3077F SYST BP >= 140 MM HG: CPT | Mod: CPTII,S$GLB,, | Performed by: INTERNAL MEDICINE

## 2022-07-21 PROCEDURE — 1101F PT FALLS ASSESS-DOCD LE1/YR: CPT | Mod: CPTII,S$GLB,, | Performed by: INTERNAL MEDICINE

## 2022-07-21 PROCEDURE — 1126F AMNT PAIN NOTED NONE PRSNT: CPT | Mod: CPTII,S$GLB,, | Performed by: INTERNAL MEDICINE

## 2022-07-21 PROCEDURE — 36415 COLL VENOUS BLD VENIPUNCTURE: CPT

## 2022-07-21 PROCEDURE — 3288F PR FALLS RISK ASSESSMENT DOCUMENTED: ICD-10-PCS | Mod: CPTII,S$GLB,, | Performed by: INTERNAL MEDICINE

## 2022-07-21 PROCEDURE — 99215 PR OFFICE/OUTPT VISIT, EST, LEVL V, 40-54 MIN: ICD-10-PCS | Mod: S$GLB,,, | Performed by: INTERNAL MEDICINE

## 2022-07-21 PROCEDURE — 3008F PR BODY MASS INDEX (BMI) DOCUMENTED: ICD-10-PCS | Mod: CPTII,S$GLB,, | Performed by: INTERNAL MEDICINE

## 2022-07-21 PROCEDURE — 4010F ACE/ARB THERAPY RXD/TAKEN: CPT | Mod: CPTII,S$GLB,, | Performed by: INTERNAL MEDICINE

## 2022-07-21 PROCEDURE — 3077F PR MOST RECENT SYSTOLIC BLOOD PRESSURE >= 140 MM HG: ICD-10-PCS | Mod: CPTII,S$GLB,, | Performed by: INTERNAL MEDICINE

## 2022-07-21 PROCEDURE — 85025 COMPLETE CBC W/AUTO DIFF WBC: CPT

## 2022-07-21 PROCEDURE — 1126F PR PAIN SEVERITY QUANTIFIED, NO PAIN PRESENT: ICD-10-PCS | Mod: CPTII,S$GLB,, | Performed by: INTERNAL MEDICINE

## 2022-07-21 RX ORDER — ANAGRELIDE 0.5 MG/1
0.5 CAPSULE ORAL 4 TIMES DAILY
Qty: 28 CAPSULE | Refills: 0 | Status: SHIPPED | OUTPATIENT
Start: 2022-07-21 | End: 2022-07-28

## 2022-08-04 ENCOUNTER — LAB VISIT (OUTPATIENT)
Dept: LAB | Facility: HOSPITAL | Age: 72
End: 2022-08-04
Attending: INTERNAL MEDICINE
Payer: MEDICARE

## 2022-08-04 DIAGNOSIS — D72.819 LEUKOPENIA, UNSPECIFIED TYPE: ICD-10-CM

## 2022-08-04 DIAGNOSIS — D64.9 ANEMIA, UNSPECIFIED TYPE: ICD-10-CM

## 2022-08-04 LAB
ALBUMIN SERPL-MCNC: 4 GM/DL (ref 3.4–4.8)
ALBUMIN/GLOB SERPL: 1.4 RATIO (ref 1.1–2)
ALP SERPL-CCNC: 74 UNIT/L (ref 40–150)
ALT SERPL-CCNC: 12 UNIT/L (ref 0–55)
AST SERPL-CCNC: 19 UNIT/L (ref 5–34)
BASOPHILS # BLD AUTO: 0.05 X10(3)/MCL (ref 0–0.2)
BASOPHILS NFR BLD AUTO: 1.4 %
BILIRUBIN DIRECT+TOT PNL SERPL-MCNC: 0.4 MG/DL
BUN SERPL-MCNC: 16.7 MG/DL (ref 9.8–20.1)
CALCIUM SERPL-MCNC: 9.3 MG/DL (ref 8.4–10.2)
CHLORIDE SERPL-SCNC: 107 MMOL/L (ref 98–107)
CO2 SERPL-SCNC: 29 MMOL/L (ref 23–31)
CREAT SERPL-MCNC: 0.65 MG/DL (ref 0.55–1.02)
EOSINOPHIL # BLD AUTO: 0.03 X10(3)/MCL (ref 0–0.9)
EOSINOPHIL NFR BLD AUTO: 0.9 %
ERYTHROCYTE [DISTWIDTH] IN BLOOD BY AUTOMATED COUNT: 13.3 % (ref 11.5–17)
GLOBULIN SER-MCNC: 2.8 GM/DL (ref 2.4–3.5)
GLUCOSE SERPL-MCNC: 90 MG/DL (ref 82–115)
HCT VFR BLD AUTO: 33.1 % (ref 37–47)
HGB BLD-MCNC: 10.8 GM/DL (ref 12–16)
IMM GRANULOCYTES # BLD AUTO: 0 X10(3)/MCL (ref 0–0.04)
IMM GRANULOCYTES NFR BLD AUTO: 0 %
LDH SERPL-CCNC: 232 U/L (ref 125–220)
LYMPHOCYTES # BLD AUTO: 0.92 X10(3)/MCL (ref 0.6–4.6)
LYMPHOCYTES NFR BLD AUTO: 26.1 %
MCH RBC QN AUTO: 36 PG (ref 27–31)
MCHC RBC AUTO-ENTMCNC: 32.6 MG/DL (ref 33–36)
MCV RBC AUTO: 110.3 FL (ref 80–94)
MONOCYTES # BLD AUTO: 0.33 X10(3)/MCL (ref 0.1–1.3)
MONOCYTES NFR BLD AUTO: 9.4 %
NEUTROPHILS # BLD AUTO: 2.2 X10(3)/MCL (ref 2.1–9.2)
NEUTROPHILS NFR BLD AUTO: 62.2 %
PLATELET # BLD AUTO: 673 X10(3)/MCL (ref 130–400)
PMV BLD AUTO: 9.1 FL (ref 7.4–10.4)
POTASSIUM SERPL-SCNC: 5.2 MMOL/L (ref 3.5–5.1)
PROT SERPL-MCNC: 6.8 GM/DL (ref 5.8–7.6)
RBC # BLD AUTO: 3 X10(6)/MCL (ref 4.2–5.4)
SODIUM SERPL-SCNC: 143 MMOL/L (ref 136–145)
WBC # SPEC AUTO: 3.5 X10(3)/MCL (ref 4.5–11.5)

## 2022-08-04 PROCEDURE — 83615 LACTATE (LD) (LDH) ENZYME: CPT

## 2022-08-04 PROCEDURE — 80053 COMPREHEN METABOLIC PANEL: CPT

## 2022-08-04 PROCEDURE — 85025 COMPLETE CBC W/AUTO DIFF WBC: CPT

## 2022-08-04 PROCEDURE — 36415 COLL VENOUS BLD VENIPUNCTURE: CPT

## 2022-08-18 ENCOUNTER — OFFICE VISIT (OUTPATIENT)
Dept: HEMATOLOGY/ONCOLOGY | Facility: CLINIC | Age: 72
End: 2022-08-18
Payer: MEDICARE

## 2022-08-18 ENCOUNTER — LAB VISIT (OUTPATIENT)
Dept: LAB | Facility: HOSPITAL | Age: 72
End: 2022-08-18
Attending: INTERNAL MEDICINE
Payer: MEDICARE

## 2022-08-18 VITALS
WEIGHT: 181.38 LBS | DIASTOLIC BLOOD PRESSURE: 64 MMHG | SYSTOLIC BLOOD PRESSURE: 112 MMHG | RESPIRATION RATE: 18 BRPM | HEIGHT: 66 IN | OXYGEN SATURATION: 98 % | HEART RATE: 62 BPM | BODY MASS INDEX: 29.15 KG/M2 | TEMPERATURE: 98 F

## 2022-08-18 DIAGNOSIS — D64.9 ANEMIA, UNSPECIFIED TYPE: ICD-10-CM

## 2022-08-18 DIAGNOSIS — D72.819 LEUKOPENIA, UNSPECIFIED TYPE: ICD-10-CM

## 2022-08-18 DIAGNOSIS — Z51.81 THERAPEUTIC DRUG MONITORING: ICD-10-CM

## 2022-08-18 DIAGNOSIS — D47.3 ESSENTIAL THROMBOCYTOSIS: Primary | ICD-10-CM

## 2022-08-18 LAB
BASOPHILS # BLD AUTO: 0.05 X10(3)/MCL (ref 0–0.2)
BASOPHILS NFR BLD AUTO: 1.2 %
EOSINOPHIL # BLD AUTO: 0.03 X10(3)/MCL (ref 0–0.9)
EOSINOPHIL NFR BLD AUTO: 0.7 %
ERYTHROCYTE [DISTWIDTH] IN BLOOD BY AUTOMATED COUNT: 13.1 % (ref 11.5–17)
HCT VFR BLD AUTO: 34.5 % (ref 37–47)
HGB BLD-MCNC: 11.3 GM/DL (ref 12–16)
IMM GRANULOCYTES # BLD AUTO: 0.01 X10(3)/MCL (ref 0–0.04)
IMM GRANULOCYTES NFR BLD AUTO: 0.2 %
LYMPHOCYTES # BLD AUTO: 0.75 X10(3)/MCL (ref 0.6–4.6)
LYMPHOCYTES NFR BLD AUTO: 18.2 %
MCH RBC QN AUTO: 36.3 PG (ref 27–31)
MCHC RBC AUTO-ENTMCNC: 32.8 MG/DL (ref 33–36)
MCV RBC AUTO: 110.9 FL (ref 80–94)
MONOCYTES # BLD AUTO: 0.33 X10(3)/MCL (ref 0.1–1.3)
MONOCYTES NFR BLD AUTO: 8 %
NEUTROPHILS # BLD AUTO: 3 X10(3)/MCL (ref 2.1–9.2)
NEUTROPHILS NFR BLD AUTO: 71.7 %
PLATELET # BLD AUTO: 590 X10(3)/MCL (ref 130–400)
PMV BLD AUTO: 8.8 FL (ref 7.4–10.4)
RBC # BLD AUTO: 3.11 X10(6)/MCL (ref 4.2–5.4)
WBC # SPEC AUTO: 4.1 X10(3)/MCL (ref 4.5–11.5)

## 2022-08-18 PROCEDURE — 3074F PR MOST RECENT SYSTOLIC BLOOD PRESSURE < 130 MM HG: ICD-10-PCS | Mod: CPTII,S$GLB,, | Performed by: INTERNAL MEDICINE

## 2022-08-18 PROCEDURE — 3078F PR MOST RECENT DIASTOLIC BLOOD PRESSURE < 80 MM HG: ICD-10-PCS | Mod: CPTII,S$GLB,, | Performed by: INTERNAL MEDICINE

## 2022-08-18 PROCEDURE — 99999 PR PBB SHADOW E&M-EST. PATIENT-LVL III: ICD-10-PCS | Mod: PBBFAC,,, | Performed by: INTERNAL MEDICINE

## 2022-08-18 PROCEDURE — 99215 PR OFFICE/OUTPT VISIT, EST, LEVL V, 40-54 MIN: ICD-10-PCS | Mod: S$GLB,,, | Performed by: INTERNAL MEDICINE

## 2022-08-18 PROCEDURE — 3008F PR BODY MASS INDEX (BMI) DOCUMENTED: ICD-10-PCS | Mod: CPTII,S$GLB,, | Performed by: INTERNAL MEDICINE

## 2022-08-18 PROCEDURE — 1159F PR MEDICATION LIST DOCUMENTED IN MEDICAL RECORD: ICD-10-PCS | Mod: CPTII,S$GLB,, | Performed by: INTERNAL MEDICINE

## 2022-08-18 PROCEDURE — 4010F PR ACE/ARB THEARPY RXD/TAKEN: ICD-10-PCS | Mod: CPTII,S$GLB,, | Performed by: INTERNAL MEDICINE

## 2022-08-18 PROCEDURE — 1126F PR PAIN SEVERITY QUANTIFIED, NO PAIN PRESENT: ICD-10-PCS | Mod: CPTII,S$GLB,, | Performed by: INTERNAL MEDICINE

## 2022-08-18 PROCEDURE — 1126F AMNT PAIN NOTED NONE PRSNT: CPT | Mod: CPTII,S$GLB,, | Performed by: INTERNAL MEDICINE

## 2022-08-18 PROCEDURE — 85025 COMPLETE CBC W/AUTO DIFF WBC: CPT

## 2022-08-18 PROCEDURE — 1160F RVW MEDS BY RX/DR IN RCRD: CPT | Mod: CPTII,S$GLB,, | Performed by: INTERNAL MEDICINE

## 2022-08-18 PROCEDURE — 3074F SYST BP LT 130 MM HG: CPT | Mod: CPTII,S$GLB,, | Performed by: INTERNAL MEDICINE

## 2022-08-18 PROCEDURE — 1160F PR REVIEW ALL MEDS BY PRESCRIBER/CLIN PHARMACIST DOCUMENTED: ICD-10-PCS | Mod: CPTII,S$GLB,, | Performed by: INTERNAL MEDICINE

## 2022-08-18 PROCEDURE — 99215 OFFICE O/P EST HI 40 MIN: CPT | Mod: S$GLB,,, | Performed by: INTERNAL MEDICINE

## 2022-08-18 PROCEDURE — 3078F DIAST BP <80 MM HG: CPT | Mod: CPTII,S$GLB,, | Performed by: INTERNAL MEDICINE

## 2022-08-18 PROCEDURE — 99999 PR PBB SHADOW E&M-EST. PATIENT-LVL III: CPT | Mod: PBBFAC,,, | Performed by: INTERNAL MEDICINE

## 2022-08-18 PROCEDURE — 36415 COLL VENOUS BLD VENIPUNCTURE: CPT

## 2022-08-18 PROCEDURE — 3008F BODY MASS INDEX DOCD: CPT | Mod: CPTII,S$GLB,, | Performed by: INTERNAL MEDICINE

## 2022-08-18 PROCEDURE — 4010F ACE/ARB THERAPY RXD/TAKEN: CPT | Mod: CPTII,S$GLB,, | Performed by: INTERNAL MEDICINE

## 2022-08-18 PROCEDURE — 1159F MED LIST DOCD IN RCRD: CPT | Mod: CPTII,S$GLB,, | Performed by: INTERNAL MEDICINE

## 2022-08-18 NOTE — PROGRESS NOTES
HEMATOLOGY/ONCOLOGY OFFICE CLINIC VISIT    Visit Information:  Visit type:  On-going care, Review/discussion of tests, Scheduled follow-up.    Accompanied by:  No one.    Source of history:  Self.    Referral source:  Roc GIFFORD, Isaac HUANG    History limitation:  None.        Initial Consultation: 3/28/2022  Referring Physician: Dr Brian  Other Physicians:  Code Status: Not addressed    Diagnosis/Problem list:   Essential Thrombocytosis  --CALR mutation analysis, exon 9: Positive.    Present Treatment:  Hydrea 500 mg bid weekdays and One SS    Treatment history:    Hydrea       Imaging:      Pathology:  March 21, 2022 15:21 Peripheral blood, CALR mutation analysis, exon 9: Positive. 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 Peripheral blood, JAK2 V617F mutation analysis: Negative for JAK2 V617F.    CLINICAL HISTORY:       Patient: 72 years old female kindly referred for thrombocytosis.     Reviewing electronic on medical record it seems like her platelets were normal up to March 2018.  In March 2019 platelets were mildly elevated 496,009 slowly there were trending up with the most recent on 3/15/2022 of 1,012,000.  She is taking baby aspirin.    On March 21, 2022 15:21 Peripheral blood, CALR mutation analysis, exon 9: Positive. 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  Peripheral blood, JAK2 V617F mutation analysis: Negative for JAK2 V617F.    She denies any family history of blood disorders or malignancies.          Chief Complaint: Here for ET    Interval History:  Patient presents today for follow up appointment to discuss lab results.  Taking Hydrea 500 mg po BID on Monday-Friday and daily on Saturday and Sunday and baby ASA daily. She is on Agrylin once a day SS.  She is tolerating with no side effects. She has been doing well. She denies any fever, chills, sweats.  No chest pain or shortness of breath.  No bleeding.    We discussed blood work and the thrombocytosis  "persists.  She has a little bit leukopenic and anemic.    ROS:  All 14 points ROS taken and as per Interval History    Histories:  PMH/PSH/FH/SOCIAL/ALLERGIES AND MEDS REVIEWED AND UPDATED AS APPROPRIATE       Vitals:    08/18/22 1102   BP: 112/64   BP Location: Left arm   Patient Position: Sitting   BP Method: Medium (Automatic)   Pulse: 62   Resp: 18   Temp: 97.6 °F (36.4 °C)   TempSrc: Oral   SpO2: 98%   Weight: 82.3 kg (181 lb 6.4 oz)   Height: 5' 5.98" (1.676 m)      Physical Exam  Vitals and nursing note reviewed.   Constitutional:       General: She is not in acute distress.     Appearance: Normal appearance. She is well-developed.   HENT:      Head: Normocephalic and atraumatic.      Mouth/Throat:      Mouth: Mucous membranes are moist.   Eyes:      General: No scleral icterus.     Extraocular Movements: Extraocular movements intact.      Conjunctiva/sclera: Conjunctivae normal.      Pupils: Pupils are equal, round, and reactive to light.   Neck:      Vascular: No JVD.   Cardiovascular:      Rate and Rhythm: Normal rate and regular rhythm.      Heart sounds: No murmur heard.  Pulmonary:      Effort: Pulmonary effort is normal.      Breath sounds: Normal breath sounds. No wheezing or rhonchi.   Chest:      Chest wall: No deformity or tenderness.   Breasts:      Right: No swelling, mass, nipple discharge, skin change, tenderness, axillary adenopathy or supraclavicular adenopathy.      Left: No swelling, mass, nipple discharge, skin change, tenderness, axillary adenopathy or supraclavicular adenopathy.       Abdominal:      General: Bowel sounds are normal. There is no distension.      Palpations: Abdomen is soft. There is no mass.      Tenderness: There is no abdominal tenderness.   Musculoskeletal:         General: No swelling or deformity.      Cervical back: Neck supple.   Lymphadenopathy:      Cervical: No cervical adenopathy.      Upper Body:      Right upper body: No supraclavicular or axillary " adenopathy.      Left upper body: No supraclavicular or axillary adenopathy.      Lower Body: No right inguinal adenopathy. No left inguinal adenopathy.   Skin:     General: Skin is warm.      Coloration: Skin is not jaundiced.      Findings: No lesion or rash.      Nails: There is no clubbing.   Neurological:      General: No focal deficit present.      Mental Status: She is alert and oriented to person, place, and time.      Sensory: Sensation is intact.      Motor: Motor function is intact.      Gait: Gait is intact.   Psychiatric:         Attention and Perception: Attention normal.         Mood and Affect: Mood and affect normal.         Speech: Speech normal.         Behavior: Behavior is cooperative.         Thought Content: Thought content normal.         Cognition and Memory: Cognition normal.         Judgment: Judgment normal.       ECOG SCORE           Laboratory:  CBC with Differential:  Lab Results   Component Value Date    WBC 4.1 (L) 08/18/2022    RBC 3.11 (L) 08/18/2022    HGB 11.3 (L) 08/18/2022    HCT 34.5 (L) 08/18/2022    .9 (H) 08/18/2022    MCH 36.3 (H) 08/18/2022    MCHC 32.8 (L) 08/18/2022    RDW 13.1 08/18/2022     (H) 08/18/2022    MPV 8.8 08/18/2022       Latest Reference Range & Units 05/23/22 08:40 06/09/22 07:37 06/23/22 11:27 07/07/22 07:27 07/21/22 11:18 08/04/22 08:10 08/18/22 10:52   WBC 4.5 - 11.5 x10(3)/mcL 2.6 (L) 3.3 (L) 4.0 (L) 3.1 (L) 3.4 (L) 3.5 (L) 4.1 (L)   (L): Data is abnormally low     Reference Range & Units 05/23/22 08:40 06/09/22 07:37 06/23/22 11:27 07/07/22 07:27 07/21/22 11:18 08/04/22 08:10 08/18/22 10:52   Platelets 130 - 400 x10(3)/mcL 449 (H) 557 (H) 612 (H) 623 (H) 646 (H) 673 (H) 590 (H)   (H): Data is abnormally high   Reference Range & Units 05/23/22 08:40 06/09/22 07:37 06/23/22 11:27 07/07/22 07:27 07/21/22 11:18 08/04/22 08:10 08/18/22 10:52   Hemoglobin 12.0 - 16.0 gm/dL 10.5 (L) 10.6 (L) 10.8 (L) 10.9 (L) 11.0 (L) 10.8 (L) 11.3 (L)   (L):  Data is abnormally low    CMP:  Sodium Level   Date Value Ref Range Status   08/04/2022 143 136 - 145 mmol/L Final     Potassium Level   Date Value Ref Range Status   08/04/2022 5.2 (H) 3.5 - 5.1 mmol/L Final     Carbon Dioxide   Date Value Ref Range Status   08/04/2022 29 23 - 31 mmol/L Final     Blood Urea Nitrogen   Date Value Ref Range Status   08/04/2022 16.7 9.8 - 20.1 mg/dL Final     Creatinine   Date Value Ref Range Status   08/04/2022 0.65 0.55 - 1.02 mg/dL Final     Calcium Level Total   Date Value Ref Range Status   08/04/2022 9.3 8.4 - 10.2 mg/dL Final     Albumin Level   Date Value Ref Range Status   08/04/2022 4.0 3.4 - 4.8 gm/dL Final     Bilirubin Total   Date Value Ref Range Status   08/04/2022 0.4 <=1.5 mg/dL Final     Alkaline Phosphatase   Date Value Ref Range Status   08/04/2022 74 40 - 150 unit/L Final     Aspartate Aminotransferase   Date Value Ref Range Status   08/04/2022 19 5 - 34 unit/L Final     Alanine Aminotransferase   Date Value Ref Range Status   08/04/2022 12 0 - 55 unit/L Final     Estimated GFR-Non    Date Value Ref Range Status   08/04/2022 >60 mls/min/1.73/m2 Final             Assessment:       1. Essential thrombocytosis    2. Leukopenia, unspecified type    3. Anemia, unspecified type    4. Therapeutic drug monitoring      Essential thrombocytosis -- CALR mutation positive, 52 bp deletion Exon 9   --on Hydrea  Anemia-due to therapy  Leukopenia-due to therapy    Patient reports that she is compliant with Hydrea.  I will add Agrylin as I have feared that increase in the Hydrea will worsen her anemia and leukopenia making her more susceptible to infection.        Plan:       Patient thrombocytosis persists but better than earlier this month. I will not increase Hydrea as this will worsen her anemia and leukopenia.   Continue  Hydrea 500 mg po BID on Monday-Friday and daily on Saturday and Sunday, will adjust if needed  Cont Agrylin  0.5 mg to take daily on  Saturday and Sunday. If thrombocytosis persists then can increase Agrylin daily q day.   Cont ASA 81 mg daily  CBC q week  RTC 4 weeks with CBC, CMP only  Encouraged to call for any questions or problems    The patient was given ample opportunity to ask questions and they were all answered to satisfaction; patient demonstrated understanding of what we discussed and is agreeable to the plan.    HANH QUEZADA MD

## 2022-08-25 ENCOUNTER — LAB VISIT (OUTPATIENT)
Dept: LAB | Facility: HOSPITAL | Age: 72
End: 2022-08-25
Attending: INTERNAL MEDICINE
Payer: MEDICARE

## 2022-08-25 DIAGNOSIS — Z51.81 THERAPEUTIC DRUG MONITORING: ICD-10-CM

## 2022-08-25 DIAGNOSIS — D72.819 LEUKOPENIA, UNSPECIFIED TYPE: ICD-10-CM

## 2022-08-25 DIAGNOSIS — D64.9 ANEMIA, UNSPECIFIED TYPE: ICD-10-CM

## 2022-08-25 DIAGNOSIS — D47.3 ESSENTIAL THROMBOCYTOSIS: ICD-10-CM

## 2022-08-25 LAB
BASOPHILS # BLD AUTO: 0.04 X10(3)/MCL (ref 0–0.2)
BASOPHILS NFR BLD AUTO: 1.4 %
EOSINOPHIL # BLD AUTO: 0.03 X10(3)/MCL (ref 0–0.9)
EOSINOPHIL NFR BLD AUTO: 1 %
ERYTHROCYTE [DISTWIDTH] IN BLOOD BY AUTOMATED COUNT: 13.2 % (ref 11.5–17)
HCT VFR BLD AUTO: 34.2 % (ref 37–47)
HGB BLD-MCNC: 11.2 GM/DL (ref 12–16)
IMM GRANULOCYTES # BLD AUTO: 0 X10(3)/MCL (ref 0–0.04)
IMM GRANULOCYTES NFR BLD AUTO: 0 %
LYMPHOCYTES # BLD AUTO: 0.71 X10(3)/MCL (ref 0.6–4.6)
LYMPHOCYTES NFR BLD AUTO: 24.1 %
MCH RBC QN AUTO: 36.5 PG (ref 27–31)
MCHC RBC AUTO-ENTMCNC: 32.7 MG/DL (ref 33–36)
MCV RBC AUTO: 111.4 FL (ref 80–94)
MONOCYTES # BLD AUTO: 0.22 X10(3)/MCL (ref 0.1–1.3)
MONOCYTES NFR BLD AUTO: 7.5 %
NEUTROPHILS # BLD AUTO: 2 X10(3)/MCL (ref 2.1–9.2)
NEUTROPHILS NFR BLD AUTO: 66 %
PLATELET # BLD AUTO: 492 X10(3)/MCL (ref 130–400)
PMV BLD AUTO: 8.6 FL (ref 7.4–10.4)
RBC # BLD AUTO: 3.07 X10(6)/MCL (ref 4.2–5.4)
WBC # SPEC AUTO: 3 X10(3)/MCL (ref 4.5–11.5)

## 2022-08-25 PROCEDURE — 36415 COLL VENOUS BLD VENIPUNCTURE: CPT

## 2022-08-25 PROCEDURE — 85025 COMPLETE CBC W/AUTO DIFF WBC: CPT

## 2022-09-01 ENCOUNTER — LAB VISIT (OUTPATIENT)
Dept: LAB | Facility: HOSPITAL | Age: 72
End: 2022-09-01
Attending: INTERNAL MEDICINE
Payer: MEDICARE

## 2022-09-01 DIAGNOSIS — Z51.81 THERAPEUTIC DRUG MONITORING: ICD-10-CM

## 2022-09-01 DIAGNOSIS — D64.9 ANEMIA, UNSPECIFIED TYPE: ICD-10-CM

## 2022-09-01 DIAGNOSIS — D72.819 LEUKOPENIA, UNSPECIFIED TYPE: ICD-10-CM

## 2022-09-01 DIAGNOSIS — D47.3 ESSENTIAL THROMBOCYTOSIS: ICD-10-CM

## 2022-09-01 LAB
BASOPHILS # BLD AUTO: 0.04 X10(3)/MCL (ref 0–0.2)
BASOPHILS NFR BLD AUTO: 1.2 %
EOSINOPHIL # BLD AUTO: 0.02 X10(3)/MCL (ref 0–0.9)
EOSINOPHIL NFR BLD AUTO: 0.6 %
ERYTHROCYTE [DISTWIDTH] IN BLOOD BY AUTOMATED COUNT: 13.7 % (ref 11.5–17)
HCT VFR BLD AUTO: 35.5 % (ref 37–47)
HGB BLD-MCNC: 11.4 GM/DL (ref 12–16)
IMM GRANULOCYTES # BLD AUTO: 0 X10(3)/MCL (ref 0–0.04)
IMM GRANULOCYTES NFR BLD AUTO: 0 %
LYMPHOCYTES # BLD AUTO: 0.92 X10(3)/MCL (ref 0.6–4.6)
LYMPHOCYTES NFR BLD AUTO: 28 %
MCH RBC QN AUTO: 36.2 PG (ref 27–31)
MCHC RBC AUTO-ENTMCNC: 32.1 MG/DL (ref 33–36)
MCV RBC AUTO: 112.7 FL (ref 80–94)
MONOCYTES # BLD AUTO: 0.29 X10(3)/MCL (ref 0.1–1.3)
MONOCYTES NFR BLD AUTO: 8.8 %
NEUTROPHILS # BLD AUTO: 2 X10(3)/MCL (ref 2.1–9.2)
NEUTROPHILS NFR BLD AUTO: 61.4 %
PLATELET # BLD AUTO: 536 X10(3)/MCL (ref 130–400)
PMV BLD AUTO: 8.9 FL (ref 7.4–10.4)
RBC # BLD AUTO: 3.15 X10(6)/MCL (ref 4.2–5.4)
WBC # SPEC AUTO: 3.3 X10(3)/MCL (ref 4.5–11.5)

## 2022-09-01 PROCEDURE — 36415 COLL VENOUS BLD VENIPUNCTURE: CPT

## 2022-09-01 PROCEDURE — 85025 COMPLETE CBC W/AUTO DIFF WBC: CPT

## 2022-09-02 DIAGNOSIS — D47.3 ESSENTIAL THROMBOCYTOSIS: Primary | ICD-10-CM

## 2022-09-02 RX ORDER — ANAGRELIDE 0.5 MG/1
CAPSULE ORAL
Qty: 24 CAPSULE | Refills: 1 | Status: SHIPPED | OUTPATIENT
Start: 2022-09-02 | End: 2022-09-15 | Stop reason: SDUPTHER

## 2022-09-08 ENCOUNTER — APPOINTMENT (OUTPATIENT)
Dept: LAB | Facility: HOSPITAL | Age: 72
End: 2022-09-08
Attending: INTERNAL MEDICINE
Payer: MEDICARE

## 2022-09-15 ENCOUNTER — OFFICE VISIT (OUTPATIENT)
Dept: HEMATOLOGY/ONCOLOGY | Facility: CLINIC | Age: 72
End: 2022-09-15
Payer: MEDICARE

## 2022-09-15 VITALS
HEART RATE: 62 BPM | HEIGHT: 66 IN | TEMPERATURE: 98 F | OXYGEN SATURATION: 97 % | WEIGHT: 186 LBS | DIASTOLIC BLOOD PRESSURE: 77 MMHG | BODY MASS INDEX: 29.89 KG/M2 | SYSTOLIC BLOOD PRESSURE: 146 MMHG

## 2022-09-15 DIAGNOSIS — D47.3 ESSENTIAL THROMBOCYTOSIS: Primary | ICD-10-CM

## 2022-09-15 DIAGNOSIS — D72.819 LEUKOPENIA, UNSPECIFIED TYPE: ICD-10-CM

## 2022-09-15 DIAGNOSIS — D64.9 ANEMIA, UNSPECIFIED TYPE: ICD-10-CM

## 2022-09-15 DIAGNOSIS — Z51.81 THERAPEUTIC DRUG MONITORING: ICD-10-CM

## 2022-09-15 LAB
ABS NEUT CALC (OHS): 1.8 X10(3)/MCL (ref 2.1–9.2)
BASOPHILS NFR BLD MANUAL: 0.03 X10(3)/MCL (ref 0–0.2)
BASOPHILS NFR BLD MANUAL: 1 % (ref 0–2)
EOSINOPHIL NFR BLD MANUAL: 0.03 X10(3)/MCL (ref 0–0.9)
EOSINOPHIL NFR BLD MANUAL: 1 % (ref 0–8)
ERYTHROCYTE [DISTWIDTH] IN BLOOD BY AUTOMATED COUNT: 14.2 % (ref 11.5–17)
HCT VFR BLD AUTO: 31.2 % (ref 37–47)
HGB BLD-MCNC: 10 GM/DL (ref 12–16)
IMM GRANULOCYTES # BLD AUTO: 0 X10(3)/MCL (ref 0–0.04)
IMM GRANULOCYTES NFR BLD AUTO: 0 %
LYMPHOCYTES NFR BLD MANUAL: 0.55 X10(3)/MCL
LYMPHOCYTES NFR BLD MANUAL: 22 % (ref 13–40)
MACROCYTES BLD QL SMEAR: ABNORMAL
MCH RBC QN AUTO: 35.8 PG (ref 27–31)
MCHC RBC AUTO-ENTMCNC: 32.1 MG/DL (ref 33–36)
MCV RBC AUTO: 111.8 FL (ref 80–94)
MONOCYTES NFR BLD MANUAL: 0.1 X10(3)/MCL (ref 0.1–1.3)
MONOCYTES NFR BLD MANUAL: 4 % (ref 2–11)
NEUTROPHILS NFR BLD MANUAL: 72 % (ref 47–80)
PLATELET # BLD AUTO: 502 X10(3)/MCL (ref 130–400)
PLATELET # BLD EST: ABNORMAL 10*3/UL
PMV BLD AUTO: 8.5 FL (ref 7.4–10.4)
RBC # BLD AUTO: 2.79 X10(6)/MCL (ref 4.2–5.4)
RBC MORPH BLD: ABNORMAL
WBC # SPEC AUTO: 2.5 X10(3)/MCL (ref 4.5–11.5)

## 2022-09-15 PROCEDURE — 1159F PR MEDICATION LIST DOCUMENTED IN MEDICAL RECORD: ICD-10-PCS | Mod: CPTII,S$GLB,, | Performed by: NURSE PRACTITIONER

## 2022-09-15 PROCEDURE — 1160F RVW MEDS BY RX/DR IN RCRD: CPT | Mod: CPTII,S$GLB,, | Performed by: NURSE PRACTITIONER

## 2022-09-15 PROCEDURE — 3078F PR MOST RECENT DIASTOLIC BLOOD PRESSURE < 80 MM HG: ICD-10-PCS | Mod: CPTII,S$GLB,, | Performed by: NURSE PRACTITIONER

## 2022-09-15 PROCEDURE — 4010F ACE/ARB THERAPY RXD/TAKEN: CPT | Mod: CPTII,S$GLB,, | Performed by: NURSE PRACTITIONER

## 2022-09-15 PROCEDURE — 1101F PT FALLS ASSESS-DOCD LE1/YR: CPT | Mod: CPTII,S$GLB,, | Performed by: NURSE PRACTITIONER

## 2022-09-15 PROCEDURE — 1101F PR PT FALLS ASSESS DOC 0-1 FALLS W/OUT INJ PAST YR: ICD-10-PCS | Mod: CPTII,S$GLB,, | Performed by: NURSE PRACTITIONER

## 2022-09-15 PROCEDURE — 3288F PR FALLS RISK ASSESSMENT DOCUMENTED: ICD-10-PCS | Mod: CPTII,S$GLB,, | Performed by: NURSE PRACTITIONER

## 2022-09-15 PROCEDURE — 4010F PR ACE/ARB THEARPY RXD/TAKEN: ICD-10-PCS | Mod: CPTII,S$GLB,, | Performed by: NURSE PRACTITIONER

## 2022-09-15 PROCEDURE — 99999 PR PBB SHADOW E&M-EST. PATIENT-LVL III: CPT | Mod: PBBFAC,,, | Performed by: NURSE PRACTITIONER

## 2022-09-15 PROCEDURE — 1160F PR REVIEW ALL MEDS BY PRESCRIBER/CLIN PHARMACIST DOCUMENTED: ICD-10-PCS | Mod: CPTII,S$GLB,, | Performed by: NURSE PRACTITIONER

## 2022-09-15 PROCEDURE — 1126F AMNT PAIN NOTED NONE PRSNT: CPT | Mod: CPTII,S$GLB,, | Performed by: NURSE PRACTITIONER

## 2022-09-15 PROCEDURE — 3288F FALL RISK ASSESSMENT DOCD: CPT | Mod: CPTII,S$GLB,, | Performed by: NURSE PRACTITIONER

## 2022-09-15 PROCEDURE — 99214 PR OFFICE/OUTPT VISIT, EST, LEVL IV, 30-39 MIN: ICD-10-PCS | Mod: S$GLB,,, | Performed by: NURSE PRACTITIONER

## 2022-09-15 PROCEDURE — 3078F DIAST BP <80 MM HG: CPT | Mod: CPTII,S$GLB,, | Performed by: NURSE PRACTITIONER

## 2022-09-15 PROCEDURE — 99999 PR PBB SHADOW E&M-EST. PATIENT-LVL III: ICD-10-PCS | Mod: PBBFAC,,, | Performed by: NURSE PRACTITIONER

## 2022-09-15 PROCEDURE — 3008F PR BODY MASS INDEX (BMI) DOCUMENTED: ICD-10-PCS | Mod: CPTII,S$GLB,, | Performed by: NURSE PRACTITIONER

## 2022-09-15 PROCEDURE — 1159F MED LIST DOCD IN RCRD: CPT | Mod: CPTII,S$GLB,, | Performed by: NURSE PRACTITIONER

## 2022-09-15 PROCEDURE — 1126F PR PAIN SEVERITY QUANTIFIED, NO PAIN PRESENT: ICD-10-PCS | Mod: CPTII,S$GLB,, | Performed by: NURSE PRACTITIONER

## 2022-09-15 PROCEDURE — 99214 OFFICE O/P EST MOD 30 MIN: CPT | Mod: S$GLB,,, | Performed by: NURSE PRACTITIONER

## 2022-09-15 PROCEDURE — 3008F BODY MASS INDEX DOCD: CPT | Mod: CPTII,S$GLB,, | Performed by: NURSE PRACTITIONER

## 2022-09-15 PROCEDURE — 3077F SYST BP >= 140 MM HG: CPT | Mod: CPTII,S$GLB,, | Performed by: NURSE PRACTITIONER

## 2022-09-15 PROCEDURE — 3077F PR MOST RECENT SYSTOLIC BLOOD PRESSURE >= 140 MM HG: ICD-10-PCS | Mod: CPTII,S$GLB,, | Performed by: NURSE PRACTITIONER

## 2022-09-15 RX ORDER — ANAGRELIDE 1 MG/1
1 CAPSULE ORAL DAILY
Qty: 30 CAPSULE | Refills: 6 | Status: SHIPPED | OUTPATIENT
Start: 2022-09-15 | End: 2023-03-15

## 2022-09-15 NOTE — PROGRESS NOTES
HEMATOLOGY/ONCOLOGY OFFICE CLINIC VISIT    Visit Information:  Visit type:  On-going care, Review/discussion of tests, Scheduled follow-up.    Accompanied by:  No one.    Source of history:  Self.    Referral source:  Roc GIFFORD, Isaac HUANG    History limitation:  None.        Initial Consultation: 3/28/2022  Referring Physician: Dr Brian  Other Physicians:  Code Status: Not addressed    Diagnosis/Problem list:   Essential Thrombocytosis  --CALR mutation analysis, exon 9: Positive.    Present Treatment:  Hydrea 500 mg bid + Agrylin 0.5 mg daily.     Treatment history:    Hydrea       Imaging:      Pathology:  March 21, 2022 15:21 Peripheral blood, CALR mutation analysis, exon 9: Positive. 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 Peripheral blood, JAK2 V617F mutation analysis: Negative for JAK2 V617F.    CLINICAL HISTORY:       Patient: 72 years old female kindly referred for thrombocytosis.     Reviewing electronic on medical record it seems like her platelets were normal up to March 2018.  In March 2019 platelets were mildly elevated 496,009 slowly there were trending up with the most recent on 3/15/2022 of 1,012,000.  She is taking baby aspirin.    On March 21, 2022 15:21 Peripheral blood, CALR mutation analysis, exon 9: Positive. 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  Peripheral blood, JAK2 V617F mutation analysis: Negative for JAK2 V617F.    She denies any family history of blood disorders or malignancies.          Chief Complaint: Here for ET    Interval History:  Patient presents today for 4 week follow up appointment.  At her last visit, Agrylin 0.5 mg was increased from twice weekly to once daily everyday.  She is also taking taking Hydrea 500 mg po BID every day. She is tolerating with no side effects. Her PLT count is slowly decreasing but she is leukopenic and more anemic. She denies any fever, chills, sweats.  No chest pain or shortness of breath.  No bleeding.   "    ROS:  All 14 points ROS taken and as per Interval History    Histories:  PMH/PSH/FH/SOCIAL/ALLERGIES AND MEDS REVIEWED AND UPDATED AS APPROPRIATE       Vitals:    09/15/22 1252   BP: (!) 146/77   BP Location: Left arm   Patient Position: Sitting   Pulse: 62   Temp: 97.7 °F (36.5 °C)   TempSrc: Oral   SpO2: 97%   Weight: 84.4 kg (186 lb)   Height: 5' 6" (1.676 m)      Physical Exam  Vitals and nursing note reviewed.   Constitutional:       General: She is not in acute distress.     Appearance: Normal appearance. She is well-developed.   HENT:      Head: Normocephalic and atraumatic.      Mouth/Throat:      Mouth: Mucous membranes are moist.   Eyes:      General: No scleral icterus.     Extraocular Movements: Extraocular movements intact.      Conjunctiva/sclera: Conjunctivae normal.      Pupils: Pupils are equal, round, and reactive to light.   Neck:      Vascular: No JVD.   Cardiovascular:      Rate and Rhythm: Normal rate and regular rhythm.      Heart sounds: No murmur heard.  Pulmonary:      Effort: Pulmonary effort is normal.      Breath sounds: Normal breath sounds. No wheezing or rhonchi.   Chest:      Chest wall: No deformity or tenderness.   Breasts:     Right: No swelling, mass, nipple discharge, skin change or tenderness.      Left: No swelling, mass, nipple discharge, skin change or tenderness.   Abdominal:      General: Bowel sounds are normal. There is no distension.      Palpations: Abdomen is soft. There is no mass.      Tenderness: There is no abdominal tenderness.   Musculoskeletal:         General: No swelling or deformity.      Cervical back: Neck supple.   Lymphadenopathy:      Cervical: No cervical adenopathy.      Upper Body:      Right upper body: No supraclavicular or axillary adenopathy.      Left upper body: No supraclavicular or axillary adenopathy.      Lower Body: No right inguinal adenopathy. No left inguinal adenopathy.   Skin:     General: Skin is warm.      Coloration: Skin is " not jaundiced.      Findings: No lesion or rash.      Nails: There is no clubbing.   Neurological:      General: No focal deficit present.      Mental Status: She is alert and oriented to person, place, and time.      Sensory: Sensation is intact.      Motor: Motor function is intact.      Gait: Gait is intact.   Psychiatric:         Attention and Perception: Attention normal.         Mood and Affect: Mood and affect normal.         Speech: Speech normal.         Behavior: Behavior is cooperative.         Thought Content: Thought content normal.         Cognition and Memory: Cognition normal.         Judgment: Judgment normal.     ECOG SCORE             Laboratory:  CBC with Differential:  Lab Results   Component Value Date    WBC 2.5 (L) 09/15/2022    RBC 2.79 (L) 09/15/2022    HGB 10.0 (L) 09/15/2022    HCT 31.2 (L) 09/15/2022    .8 (H) 09/15/2022    MCH 35.8 (H) 09/15/2022    MCHC 32.1 (L) 09/15/2022    RDW 14.2 09/15/2022     (H) 09/15/2022    MPV 8.5 09/15/2022       Latest Reference Range & Units 05/23/22 08:40 06/09/22 07:37 06/23/22 11:27 07/07/22 07:27 07/21/22 11:18 08/04/22 08:10 08/18/22 10:52   WBC 4.5 - 11.5 x10(3)/mcL 2.6 (L) 3.3 (L) 4.0 (L) 3.1 (L) 3.4 (L) 3.5 (L) 4.1 (L)   (L): Data is abnormally low     Reference Range & Units 05/23/22 08:40 06/09/22 07:37 06/23/22 11:27 07/07/22 07:27 07/21/22 11:18 08/04/22 08:10 08/18/22 10:52   Platelets 130 - 400 x10(3)/mcL 449 (H) 557 (H) 612 (H) 623 (H) 646 (H) 673 (H) 590 (H)   (H): Data is abnormally high   Reference Range & Units 05/23/22 08:40 06/09/22 07:37 06/23/22 11:27 07/07/22 07:27 07/21/22 11:18 08/04/22 08:10 08/18/22 10:52   Hemoglobin 12.0 - 16.0 gm/dL 10.5 (L) 10.6 (L) 10.8 (L) 10.9 (L) 11.0 (L) 10.8 (L) 11.3 (L)   (L): Data is abnormally low    CMP:  Sodium Level   Date Value Ref Range Status   08/04/2022 143 136 - 145 mmol/L Final     Potassium Level   Date Value Ref Range Status   08/04/2022 5.2 (H) 3.5 - 5.1 mmol/L Final      Carbon Dioxide   Date Value Ref Range Status   08/04/2022 29 23 - 31 mmol/L Final     Blood Urea Nitrogen   Date Value Ref Range Status   08/04/2022 16.7 9.8 - 20.1 mg/dL Final     Creatinine   Date Value Ref Range Status   08/04/2022 0.65 0.55 - 1.02 mg/dL Final     Calcium Level Total   Date Value Ref Range Status   08/04/2022 9.3 8.4 - 10.2 mg/dL Final     Albumin Level   Date Value Ref Range Status   08/04/2022 4.0 3.4 - 4.8 gm/dL Final     Bilirubin Total   Date Value Ref Range Status   08/04/2022 0.4 <=1.5 mg/dL Final     Alkaline Phosphatase   Date Value Ref Range Status   08/04/2022 74 40 - 150 unit/L Final     Aspartate Aminotransferase   Date Value Ref Range Status   08/04/2022 19 5 - 34 unit/L Final     Alanine Aminotransferase   Date Value Ref Range Status   08/04/2022 12 0 - 55 unit/L Final     Estimated GFR-Non    Date Value Ref Range Status   08/04/2022 >60 mls/min/1.73/m2 Final             Assessment:       No diagnosis found.    Essential thrombocytosis -- CALR mutation positive, 52 bp deletion Exon 9   --on Hydrea  Anemia-due to therapy  Leukopenia-due to therapy    Patient reports that she is compliant with Hydrea.  I will add Agrylin as I have feared that increase in the Hydrea will worsen her anemia and leukopenia making her more susceptible to infection.        Plan:       Thrombocytosis persists but slightly better since Agrylin 0.5 mg was increase to daily however WBC is decreasing as well as H&H.  She is taking Hydrea 500 mg bid everyday at this time.   We will decrease Hydrea to 500 mg daily and increase Agrylin to 1 mg daily to see if we can improve her labs some. I told her to take Hydrea 500 mg once daily and Agrylin 0.5 mg once daily for the next 4 days to allow time for her WBC  & H&H to recover (ANC is 1.8 so we don't need to hold the medication)   Cont ASA 81 mg daily  CBC q week  RTC 4 weeks with CBC, CMP   Encouraged to call for any questions or problems    The  patient was given ample opportunity to ask questions and they were all answered to satisfaction; patient demonstrated understanding of what we discussed and is agreeable to the plan.    GORDON Collazo

## 2022-09-22 ENCOUNTER — LAB VISIT (OUTPATIENT)
Dept: LAB | Facility: HOSPITAL | Age: 72
End: 2022-09-22
Attending: INTERNAL MEDICINE
Payer: MEDICARE

## 2022-09-22 DIAGNOSIS — D64.9 ANEMIA, UNSPECIFIED TYPE: ICD-10-CM

## 2022-09-22 DIAGNOSIS — D72.819 LEUKOPENIA, UNSPECIFIED TYPE: ICD-10-CM

## 2022-09-22 DIAGNOSIS — D47.3 ESSENTIAL THROMBOCYTOSIS: ICD-10-CM

## 2022-09-22 DIAGNOSIS — Z51.81 THERAPEUTIC DRUG MONITORING: ICD-10-CM

## 2022-09-22 LAB
ALBUMIN SERPL-MCNC: 3.9 GM/DL (ref 3.4–4.8)
ALBUMIN/GLOB SERPL: 1.5 RATIO (ref 1.1–2)
ALP SERPL-CCNC: 66 UNIT/L (ref 40–150)
ALT SERPL-CCNC: 11 UNIT/L (ref 0–55)
AST SERPL-CCNC: 17 UNIT/L (ref 5–34)
BASOPHILS # BLD AUTO: 0.03 X10(3)/MCL (ref 0–0.2)
BASOPHILS NFR BLD AUTO: 0.8 %
BILIRUBIN DIRECT+TOT PNL SERPL-MCNC: 0.5 MG/DL
BUN SERPL-MCNC: 14.9 MG/DL (ref 9.8–20.1)
CALCIUM SERPL-MCNC: 9.4 MG/DL (ref 8.4–10.2)
CHLORIDE SERPL-SCNC: 106 MMOL/L (ref 98–107)
CO2 SERPL-SCNC: 27 MMOL/L (ref 23–31)
CREAT SERPL-MCNC: 0.67 MG/DL (ref 0.55–1.02)
EOSINOPHIL # BLD AUTO: 0.02 X10(3)/MCL (ref 0–0.9)
EOSINOPHIL NFR BLD AUTO: 0.5 %
ERYTHROCYTE [DISTWIDTH] IN BLOOD BY AUTOMATED COUNT: 14.7 % (ref 11.5–17)
GFR SERPLBLD CREATININE-BSD FMLA CKD-EPI: >60 MLS/MIN/1.73/M2
GLOBULIN SER-MCNC: 2.6 GM/DL (ref 2.4–3.5)
GLUCOSE SERPL-MCNC: 88 MG/DL (ref 82–115)
HCT VFR BLD AUTO: 32 % (ref 37–47)
HGB BLD-MCNC: 10.4 GM/DL (ref 12–16)
IMM GRANULOCYTES # BLD AUTO: 0 X10(3)/MCL (ref 0–0.04)
IMM GRANULOCYTES NFR BLD AUTO: 0 %
LYMPHOCYTES # BLD AUTO: 0.67 X10(3)/MCL (ref 0.6–4.6)
LYMPHOCYTES NFR BLD AUTO: 18.2 %
MCH RBC QN AUTO: 36.4 PG (ref 27–31)
MCHC RBC AUTO-ENTMCNC: 32.5 MG/DL (ref 33–36)
MCV RBC AUTO: 111.9 FL (ref 80–94)
MONOCYTES # BLD AUTO: 0.4 X10(3)/MCL (ref 0.1–1.3)
MONOCYTES NFR BLD AUTO: 10.9 %
NEUTROPHILS # BLD AUTO: 2.6 X10(3)/MCL (ref 2.1–9.2)
NEUTROPHILS NFR BLD AUTO: 69.6 %
PLATELET # BLD AUTO: 451 X10(3)/MCL (ref 130–400)
PMV BLD AUTO: 8.9 FL (ref 7.4–10.4)
POTASSIUM SERPL-SCNC: 4.9 MMOL/L (ref 3.5–5.1)
PROT SERPL-MCNC: 6.5 GM/DL (ref 5.8–7.6)
RBC # BLD AUTO: 2.86 X10(6)/MCL (ref 4.2–5.4)
SODIUM SERPL-SCNC: 141 MMOL/L (ref 136–145)
WBC # SPEC AUTO: 3.7 X10(3)/MCL (ref 4.5–11.5)

## 2022-09-22 PROCEDURE — 80053 COMPREHEN METABOLIC PANEL: CPT

## 2022-09-22 PROCEDURE — 36415 COLL VENOUS BLD VENIPUNCTURE: CPT

## 2022-09-22 PROCEDURE — 85025 COMPLETE CBC W/AUTO DIFF WBC: CPT

## 2022-09-29 ENCOUNTER — LAB VISIT (OUTPATIENT)
Dept: LAB | Facility: HOSPITAL | Age: 72
End: 2022-09-29
Attending: INTERNAL MEDICINE
Payer: MEDICARE

## 2022-09-29 DIAGNOSIS — Z51.81 THERAPEUTIC DRUG MONITORING: ICD-10-CM

## 2022-09-29 DIAGNOSIS — D72.819 LEUKOPENIA, UNSPECIFIED TYPE: ICD-10-CM

## 2022-09-29 DIAGNOSIS — D47.3 ESSENTIAL THROMBOCYTOSIS: ICD-10-CM

## 2022-09-29 DIAGNOSIS — D64.9 ANEMIA, UNSPECIFIED TYPE: ICD-10-CM

## 2022-09-29 LAB
BASOPHILS # BLD AUTO: 0.05 X10(3)/MCL (ref 0–0.2)
BASOPHILS NFR BLD AUTO: 1.6 %
EOSINOPHIL # BLD AUTO: 0.02 X10(3)/MCL (ref 0–0.9)
EOSINOPHIL NFR BLD AUTO: 0.6 %
ERYTHROCYTE [DISTWIDTH] IN BLOOD BY AUTOMATED COUNT: 14.4 % (ref 11.5–17)
HCT VFR BLD AUTO: 33.5 % (ref 37–47)
HGB BLD-MCNC: 10.7 GM/DL (ref 12–16)
IMM GRANULOCYTES # BLD AUTO: 0.01 X10(3)/MCL (ref 0–0.04)
IMM GRANULOCYTES NFR BLD AUTO: 0.3 %
LYMPHOCYTES # BLD AUTO: 0.71 X10(3)/MCL (ref 0.6–4.6)
LYMPHOCYTES NFR BLD AUTO: 22.3 %
MCH RBC QN AUTO: 36 PG (ref 27–31)
MCHC RBC AUTO-ENTMCNC: 31.9 MG/DL (ref 33–36)
MCV RBC AUTO: 112.8 FL (ref 80–94)
MONOCYTES # BLD AUTO: 0.28 X10(3)/MCL (ref 0.1–1.3)
MONOCYTES NFR BLD AUTO: 8.8 %
NEUTROPHILS # BLD AUTO: 2.1 X10(3)/MCL (ref 2.1–9.2)
NEUTROPHILS NFR BLD AUTO: 66.4 %
PLATELET # BLD AUTO: 564 X10(3)/MCL (ref 130–400)
PMV BLD AUTO: 9 FL (ref 7.4–10.4)
RBC # BLD AUTO: 2.97 X10(6)/MCL (ref 4.2–5.4)
WBC # SPEC AUTO: 3.2 X10(3)/MCL (ref 4.5–11.5)

## 2022-09-29 PROCEDURE — 36415 COLL VENOUS BLD VENIPUNCTURE: CPT

## 2022-09-29 PROCEDURE — 85025 COMPLETE CBC W/AUTO DIFF WBC: CPT

## 2022-10-06 ENCOUNTER — LAB VISIT (OUTPATIENT)
Dept: LAB | Facility: HOSPITAL | Age: 72
End: 2022-10-06
Attending: INTERNAL MEDICINE
Payer: MEDICARE

## 2022-10-06 DIAGNOSIS — D72.819 LEUKOPENIA, UNSPECIFIED TYPE: ICD-10-CM

## 2022-10-06 DIAGNOSIS — D47.3 ESSENTIAL THROMBOCYTOSIS: ICD-10-CM

## 2022-10-06 DIAGNOSIS — D47.3 ESSENTIAL THROMBOCYTOSIS: Primary | ICD-10-CM

## 2022-10-06 LAB
BASOPHILS # BLD AUTO: 0.04 X10(3)/MCL (ref 0–0.2)
BASOPHILS NFR BLD AUTO: 1.2 %
EOSINOPHIL # BLD AUTO: 0.02 X10(3)/MCL (ref 0–0.9)
EOSINOPHIL NFR BLD AUTO: 0.6 %
ERYTHROCYTE [DISTWIDTH] IN BLOOD BY AUTOMATED COUNT: 13.9 % (ref 11.5–17)
HCT VFR BLD AUTO: 31.9 % (ref 37–47)
HGB BLD-MCNC: 10.3 GM/DL (ref 12–16)
IMM GRANULOCYTES # BLD AUTO: 0 X10(3)/MCL (ref 0–0.04)
IMM GRANULOCYTES NFR BLD AUTO: 0 %
LYMPHOCYTES # BLD AUTO: 0.65 X10(3)/MCL (ref 0.6–4.6)
LYMPHOCYTES NFR BLD AUTO: 19.9 %
MCH RBC QN AUTO: 35.9 PG (ref 27–31)
MCHC RBC AUTO-ENTMCNC: 32.3 MG/DL (ref 33–36)
MCV RBC AUTO: 111.1 FL (ref 80–94)
MONOCYTES # BLD AUTO: 0.34 X10(3)/MCL (ref 0.1–1.3)
MONOCYTES NFR BLD AUTO: 10.4 %
NEUTROPHILS # BLD AUTO: 2.2 X10(3)/MCL (ref 2.1–9.2)
NEUTROPHILS NFR BLD AUTO: 67.9 %
PLATELET # BLD AUTO: 602 X10(3)/MCL (ref 130–400)
PMV BLD AUTO: 9.2 FL (ref 7.4–10.4)
RBC # BLD AUTO: 2.87 X10(6)/MCL (ref 4.2–5.4)
WBC # SPEC AUTO: 3.3 X10(3)/MCL (ref 4.5–11.5)

## 2022-10-06 PROCEDURE — 36415 COLL VENOUS BLD VENIPUNCTURE: CPT

## 2022-10-06 PROCEDURE — 85025 COMPLETE CBC W/AUTO DIFF WBC: CPT

## 2022-10-13 ENCOUNTER — OFFICE VISIT (OUTPATIENT)
Dept: HEMATOLOGY/ONCOLOGY | Facility: CLINIC | Age: 72
End: 2022-10-13
Payer: MEDICARE

## 2022-10-13 ENCOUNTER — LAB VISIT (OUTPATIENT)
Dept: LAB | Facility: HOSPITAL | Age: 72
End: 2022-10-13
Attending: INTERNAL MEDICINE
Payer: MEDICARE

## 2022-10-13 VITALS
TEMPERATURE: 98 F | HEIGHT: 66 IN | OXYGEN SATURATION: 98 % | HEART RATE: 73 BPM | WEIGHT: 182 LBS | BODY MASS INDEX: 29.25 KG/M2 | SYSTOLIC BLOOD PRESSURE: 173 MMHG | DIASTOLIC BLOOD PRESSURE: 101 MMHG

## 2022-10-13 DIAGNOSIS — Z51.81 THERAPEUTIC DRUG MONITORING: ICD-10-CM

## 2022-10-13 DIAGNOSIS — D47.3 ESSENTIAL THROMBOCYTOSIS: Primary | ICD-10-CM

## 2022-10-13 DIAGNOSIS — D72.819 LEUKOPENIA, UNSPECIFIED TYPE: ICD-10-CM

## 2022-10-13 DIAGNOSIS — D47.3 ESSENTIAL THROMBOCYTOSIS: ICD-10-CM

## 2022-10-13 LAB
ALBUMIN SERPL-MCNC: 4.1 GM/DL (ref 3.4–4.8)
ALBUMIN/GLOB SERPL: 1.5 RATIO (ref 1.1–2)
ALP SERPL-CCNC: 66 UNIT/L (ref 40–150)
ALT SERPL-CCNC: 14 UNIT/L (ref 0–55)
AST SERPL-CCNC: 22 UNIT/L (ref 5–34)
BASOPHILS # BLD AUTO: 0.06 X10(3)/MCL (ref 0–0.2)
BASOPHILS NFR BLD AUTO: 1.3 %
BILIRUBIN DIRECT+TOT PNL SERPL-MCNC: 0.4 MG/DL
BUN SERPL-MCNC: 25.3 MG/DL (ref 9.8–20.1)
CALCIUM SERPL-MCNC: 9.3 MG/DL (ref 8.4–10.2)
CHLORIDE SERPL-SCNC: 107 MMOL/L (ref 98–107)
CO2 SERPL-SCNC: 26 MMOL/L (ref 23–31)
CREAT SERPL-MCNC: 0.69 MG/DL (ref 0.55–1.02)
EOSINOPHIL # BLD AUTO: 0.02 X10(3)/MCL (ref 0–0.9)
EOSINOPHIL NFR BLD AUTO: 0.4 %
ERYTHROCYTE [DISTWIDTH] IN BLOOD BY AUTOMATED COUNT: 13.4 % (ref 11.5–17)
GFR SERPLBLD CREATININE-BSD FMLA CKD-EPI: >60 MLS/MIN/1.73/M2
GLOBULIN SER-MCNC: 2.8 GM/DL (ref 2.4–3.5)
GLUCOSE SERPL-MCNC: 85 MG/DL (ref 82–115)
HCT VFR BLD AUTO: 33.9 % (ref 37–47)
HGB BLD-MCNC: 10.9 GM/DL (ref 12–16)
IMM GRANULOCYTES # BLD AUTO: 0.01 X10(3)/MCL (ref 0–0.04)
IMM GRANULOCYTES NFR BLD AUTO: 0.2 %
LYMPHOCYTES # BLD AUTO: 0.87 X10(3)/MCL (ref 0.6–4.6)
LYMPHOCYTES NFR BLD AUTO: 18.9 %
MCH RBC QN AUTO: 35.6 PG (ref 27–31)
MCHC RBC AUTO-ENTMCNC: 32.2 MG/DL (ref 33–36)
MCV RBC AUTO: 110.8 FL (ref 80–94)
MONOCYTES # BLD AUTO: 0.36 X10(3)/MCL (ref 0.1–1.3)
MONOCYTES NFR BLD AUTO: 7.8 %
NEUTROPHILS # BLD AUTO: 3.3 X10(3)/MCL (ref 2.1–9.2)
NEUTROPHILS NFR BLD AUTO: 71.4 %
PLATELET # BLD AUTO: 691 X10(3)/MCL (ref 130–400)
PMV BLD AUTO: 9.3 FL (ref 7.4–10.4)
POTASSIUM SERPL-SCNC: 5.1 MMOL/L (ref 3.5–5.1)
PROT SERPL-MCNC: 6.9 GM/DL (ref 5.8–7.6)
RBC # BLD AUTO: 3.06 X10(6)/MCL (ref 4.2–5.4)
SODIUM SERPL-SCNC: 139 MMOL/L (ref 136–145)
WBC # SPEC AUTO: 4.6 X10(3)/MCL (ref 4.5–11.5)

## 2022-10-13 PROCEDURE — 99999 PR PBB SHADOW E&M-EST. PATIENT-LVL III: ICD-10-PCS | Mod: PBBFAC,,, | Performed by: INTERNAL MEDICINE

## 2022-10-13 PROCEDURE — 1160F PR REVIEW ALL MEDS BY PRESCRIBER/CLIN PHARMACIST DOCUMENTED: ICD-10-PCS | Mod: CPTII,S$GLB,, | Performed by: INTERNAL MEDICINE

## 2022-10-13 PROCEDURE — 99215 PR OFFICE/OUTPT VISIT, EST, LEVL V, 40-54 MIN: ICD-10-PCS | Mod: S$GLB,,, | Performed by: INTERNAL MEDICINE

## 2022-10-13 PROCEDURE — 1101F PR PT FALLS ASSESS DOC 0-1 FALLS W/OUT INJ PAST YR: ICD-10-PCS | Mod: CPTII,S$GLB,, | Performed by: INTERNAL MEDICINE

## 2022-10-13 PROCEDURE — 4010F PR ACE/ARB THEARPY RXD/TAKEN: ICD-10-PCS | Mod: CPTII,S$GLB,, | Performed by: INTERNAL MEDICINE

## 2022-10-13 PROCEDURE — 1101F PT FALLS ASSESS-DOCD LE1/YR: CPT | Mod: CPTII,S$GLB,, | Performed by: INTERNAL MEDICINE

## 2022-10-13 PROCEDURE — 3288F PR FALLS RISK ASSESSMENT DOCUMENTED: ICD-10-PCS | Mod: CPTII,S$GLB,, | Performed by: INTERNAL MEDICINE

## 2022-10-13 PROCEDURE — 36415 COLL VENOUS BLD VENIPUNCTURE: CPT

## 2022-10-13 PROCEDURE — 3080F DIAST BP >= 90 MM HG: CPT | Mod: CPTII,S$GLB,, | Performed by: INTERNAL MEDICINE

## 2022-10-13 PROCEDURE — 1126F AMNT PAIN NOTED NONE PRSNT: CPT | Mod: CPTII,S$GLB,, | Performed by: INTERNAL MEDICINE

## 2022-10-13 PROCEDURE — 99999 PR PBB SHADOW E&M-EST. PATIENT-LVL III: CPT | Mod: PBBFAC,,, | Performed by: INTERNAL MEDICINE

## 2022-10-13 PROCEDURE — 1160F RVW MEDS BY RX/DR IN RCRD: CPT | Mod: CPTII,S$GLB,, | Performed by: INTERNAL MEDICINE

## 2022-10-13 PROCEDURE — 3077F PR MOST RECENT SYSTOLIC BLOOD PRESSURE >= 140 MM HG: ICD-10-PCS | Mod: CPTII,S$GLB,, | Performed by: INTERNAL MEDICINE

## 2022-10-13 PROCEDURE — 85025 COMPLETE CBC W/AUTO DIFF WBC: CPT

## 2022-10-13 PROCEDURE — 1159F MED LIST DOCD IN RCRD: CPT | Mod: CPTII,S$GLB,, | Performed by: INTERNAL MEDICINE

## 2022-10-13 PROCEDURE — 99215 OFFICE O/P EST HI 40 MIN: CPT | Mod: S$GLB,,, | Performed by: INTERNAL MEDICINE

## 2022-10-13 PROCEDURE — 3077F SYST BP >= 140 MM HG: CPT | Mod: CPTII,S$GLB,, | Performed by: INTERNAL MEDICINE

## 2022-10-13 PROCEDURE — 3080F PR MOST RECENT DIASTOLIC BLOOD PRESSURE >= 90 MM HG: ICD-10-PCS | Mod: CPTII,S$GLB,, | Performed by: INTERNAL MEDICINE

## 2022-10-13 PROCEDURE — 1159F PR MEDICATION LIST DOCUMENTED IN MEDICAL RECORD: ICD-10-PCS | Mod: CPTII,S$GLB,, | Performed by: INTERNAL MEDICINE

## 2022-10-13 PROCEDURE — 1126F PR PAIN SEVERITY QUANTIFIED, NO PAIN PRESENT: ICD-10-PCS | Mod: CPTII,S$GLB,, | Performed by: INTERNAL MEDICINE

## 2022-10-13 PROCEDURE — 80053 COMPREHEN METABOLIC PANEL: CPT

## 2022-10-13 PROCEDURE — 3288F FALL RISK ASSESSMENT DOCD: CPT | Mod: CPTII,S$GLB,, | Performed by: INTERNAL MEDICINE

## 2022-10-13 PROCEDURE — 4010F ACE/ARB THERAPY RXD/TAKEN: CPT | Mod: CPTII,S$GLB,, | Performed by: INTERNAL MEDICINE

## 2022-10-13 NOTE — PROGRESS NOTES
HEMATOLOGY/ONCOLOGY OFFICE CLINIC VISIT    Visit Information:  Visit type:  On-going care, Review/discussion of tests, Scheduled follow-up.    Accompanied by:  No one.    Source of history:  Self.    Referral source:  Roc GIFFORD, Isaac HUANG    History limitation:  None.        Initial Consultation: 3/28/2022  Referring Physician: Dr Brian  Other Physicians:  Code Status: Not addressed    Diagnosis/Problem list:   Essential Thrombocytosis  --CALR mutation analysis, exon 9: Positive.    Present Treatment:  Hydrea 500 mg Daily + Agrylin 1 mg daily.     Treatment history:    Hydrea       Imaging:      Pathology:  March 21, 2022 15:21 Peripheral blood, CALR mutation analysis, exon 9: Positive. 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 Peripheral blood, JAK2 V617F mutation analysis: Negative for JAK2 V617F.    CLINICAL HISTORY:       Patient: 72 years old female kindly referred for thrombocytosis.     Reviewing electronic on medical record it seems like her platelets were normal up to March 2018.  In March 2019 platelets were mildly elevated 496,009 slowly there were trending up with the most recent on 3/15/2022 of 1,012,000.  She is taking baby aspirin.    March 21, 2022 15:21 Peripheral blood, CALR mutation analysis, exon 9: Positive. 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  Peripheral blood, JAK2 V617F mutation analysis: Negative for JAK2 V617F.    She denies any family history of blood disorders or malignancies.        Chief Complaint: Pain in the back of lt shoulder blade when taking a breath and Headaches, have not taken bp medication in 1 mo, /103  Here for ET    Interval History:  Patient presents today for 4 week follow up appointment.  She is taking Hydrea 500 mg daily and Agrylin 1 mg daily. She is tolerating with no side effects except for fatigue. Labs drawn today, PLT count is 691k, Hgb is 10.9. She reports occasional pain to upper back on left side while driving or  "when taking a deep breath. Otherwise, she is doing well. She denies any fever, chills, sweats.  No chest pain or shortness of breath.  No bleeding.    Blood pressure elevated but no symptoms associated with it. No headaches or changes in vision. No neurologic deficit    ROS:  All 14 points ROS taken and as per Interval History    Histories:  PMH/PSH/FH/SOCIAL/ALLERGIES AND MEDS REVIEWED AND UPDATED AS APPROPRIATE       Vitals:    10/13/22 1125   BP: (!) 173/101   BP Location: Left arm   Patient Position: Sitting   Pulse: 73   Temp: 97.6 °F (36.4 °C)   TempSrc: Oral   SpO2: 98%   Weight: 82.6 kg (182 lb)   Height: 5' 6" (1.676 m)        Physical Exam  Vitals and nursing note reviewed.   Constitutional:       General: She is not in acute distress.     Appearance: Normal appearance. She is well-developed.   HENT:      Head: Normocephalic and atraumatic.      Mouth/Throat:      Mouth: Mucous membranes are moist.   Eyes:      General: No scleral icterus.     Extraocular Movements: Extraocular movements intact.      Conjunctiva/sclera: Conjunctivae normal.      Pupils: Pupils are equal, round, and reactive to light.   Neck:      Vascular: No JVD.   Cardiovascular:      Rate and Rhythm: Normal rate and regular rhythm.      Heart sounds: No murmur heard.  Pulmonary:      Effort: Pulmonary effort is normal.      Breath sounds: Normal breath sounds. No wheezing or rhonchi.   Chest:      Chest wall: No deformity or tenderness.   Breasts:     Right: No swelling, mass, nipple discharge, skin change or tenderness.      Left: No swelling, mass, nipple discharge, skin change or tenderness.   Abdominal:      General: Bowel sounds are normal. There is no distension.      Palpations: Abdomen is soft. There is no splenomegaly or mass.      Tenderness: There is no abdominal tenderness.   Musculoskeletal:         General: No swelling or deformity.      Cervical back: Neck supple.   Lymphadenopathy:      Cervical: No cervical adenopathy. "      Upper Body:      Right upper body: No supraclavicular or axillary adenopathy.      Left upper body: No supraclavicular or axillary adenopathy.      Lower Body: No right inguinal adenopathy. No left inguinal adenopathy.   Skin:     General: Skin is warm.      Coloration: Skin is not jaundiced.      Findings: No lesion or rash.      Nails: There is no clubbing.   Neurological:      General: No focal deficit present.      Mental Status: She is alert and oriented to person, place, and time.      Sensory: Sensation is intact.      Motor: Motor function is intact.      Gait: Gait is intact.   Psychiatric:         Attention and Perception: Attention normal.         Mood and Affect: Mood and affect normal.         Speech: Speech normal.         Behavior: Behavior is cooperative.         Thought Content: Thought content normal.         Cognition and Memory: Cognition normal.         Judgment: Judgment normal.     ECOG SCORE             Laboratory:  CBC with Differential:  Lab Results   Component Value Date    WBC 4.6 10/13/2022    RBC 3.06 (L) 10/13/2022    HGB 10.9 (L) 10/13/2022    HCT 33.9 (L) 10/13/2022    .8 (H) 10/13/2022    MCH 35.6 (H) 10/13/2022    MCHC 32.2 (L) 10/13/2022    RDW 13.4 10/13/2022     (H) 10/13/2022    MPV 9.3 10/13/2022       Latest Reference Range & Units 05/23/22 08:40 06/09/22 07:37 06/23/22 11:27 07/07/22 07:27 07/21/22 11:18 08/04/22 08:10 08/18/22 10:52   WBC 4.5 - 11.5 x10(3)/mcL 2.6 (L) 3.3 (L) 4.0 (L) 3.1 (L) 3.4 (L) 3.5 (L) 4.1 (L)   (L): Data is abnormally low     Reference Range & Units 05/23/22 08:40 06/09/22 07:37 06/23/22 11:27 07/07/22 07:27 07/21/22 11:18 08/04/22 08:10 08/18/22 10:52   Platelets 130 - 400 x10(3)/mcL 449 (H) 557 (H) 612 (H) 623 (H) 646 (H) 673 (H) 590 (H)   (H): Data is abnormally high   Reference Range & Units 05/23/22 08:40 06/09/22 07:37 06/23/22 11:27 07/07/22 07:27 07/21/22 11:18 08/04/22 08:10 08/18/22 10:52   Hemoglobin 12.0 - 16.0 gm/dL  10.5 (L) 10.6 (L) 10.8 (L) 10.9 (L) 11.0 (L) 10.8 (L) 11.3 (L)   (L): Data is abnormally low    CMP:  Sodium Level   Date Value Ref Range Status   09/22/2022 141 136 - 145 mmol/L Final     Potassium Level   Date Value Ref Range Status   09/22/2022 4.9 3.5 - 5.1 mmol/L Final     Carbon Dioxide   Date Value Ref Range Status   09/22/2022 27 23 - 31 mmol/L Final     Blood Urea Nitrogen   Date Value Ref Range Status   09/22/2022 14.9 9.8 - 20.1 mg/dL Final     Creatinine   Date Value Ref Range Status   09/22/2022 0.67 0.55 - 1.02 mg/dL Final     Calcium Level Total   Date Value Ref Range Status   09/22/2022 9.4 8.4 - 10.2 mg/dL Final     Albumin Level   Date Value Ref Range Status   09/22/2022 3.9 3.4 - 4.8 gm/dL Final     Bilirubin Total   Date Value Ref Range Status   09/22/2022 0.5 <=1.5 mg/dL Final     Alkaline Phosphatase   Date Value Ref Range Status   09/22/2022 66 40 - 150 unit/L Final     Aspartate Aminotransferase   Date Value Ref Range Status   09/22/2022 17 5 - 34 unit/L Final     Alanine Aminotransferase   Date Value Ref Range Status   09/22/2022 11 0 - 55 unit/L Final     Estimated GFR-Non    Date Value Ref Range Status   08/04/2022 >60 mls/min/1.73/m2 Final             Assessment:       1. Essential thrombocytosis    2. Leukopenia, unspecified type    3. Therapeutic drug monitoring        Essential thrombocytosis -- CALR mutation positive, 52 bp deletion Exon 9   --on Hydrea  Anemia-due to therapy  Leukopenia-due to therapy    Patient reports that she is compliant with Hydrea.  I will add Agrylin as I have feared that increase in the Hydrea will worsen her anemia and leukopenia making her more susceptible to infection.        Plan:       Thrombocytosis persists. Now on Agrylin 1 mg  daily and Hydrea 500 mg daily.  Will not make any adjustment at this time as she is anemic fatigue is not better.  Emphasized continuation of daily baby aspirin.         Continue Hydrea  500 mg daily and   Agrylin to 1 mg daily, will adjust depending on her counts.      Cont ASA 81 mg daily  RTC 4 weeks with CBC, CMP same day  She will address intermittent shoulder pain with her PCP as well as elevated blood pressure  Instructed to go to the ER for any chest pain, short of breath, neurological deficits or any other problems.  Verbalized understanding.  Encouraged to call for any questions or problems  The patient was given ample opportunity to ask questions and they were all answered to satisfaction; patient demonstrated understanding of what we discussed and is agreeable to the plan.    HANH QUEZADA MD      Professional Services   I, Lena Murrieta LPN, acted solely as a scribe for and in the presence of Dr. Hanh Quezada, who performed these services.

## 2022-10-16 PROBLEM — I10 HYPERTENSION: Status: ACTIVE | Noted: 2022-10-16

## 2022-10-16 PROBLEM — F32.A DEPRESSIVE DISORDER: Status: ACTIVE | Noted: 2022-10-16

## 2022-10-16 PROBLEM — E78.00 HYPERCHOLESTEROLEMIA: Status: ACTIVE | Noted: 2022-10-16

## 2022-10-17 PROBLEM — M54.6 ACUTE LEFT-SIDED THORACIC BACK PAIN: Status: ACTIVE | Noted: 2022-10-17

## 2022-10-20 ENCOUNTER — LAB VISIT (OUTPATIENT)
Dept: LAB | Facility: HOSPITAL | Age: 72
End: 2022-10-20
Attending: INTERNAL MEDICINE
Payer: MEDICARE

## 2022-10-20 DIAGNOSIS — D72.819 LEUKOPENIA, UNSPECIFIED TYPE: ICD-10-CM

## 2022-10-20 DIAGNOSIS — D47.3 ESSENTIAL THROMBOCYTOSIS: ICD-10-CM

## 2022-10-20 LAB
BASOPHILS # BLD AUTO: 0.06 X10(3)/MCL (ref 0–0.2)
BASOPHILS NFR BLD AUTO: 1.4 %
EOSINOPHIL # BLD AUTO: 0.05 X10(3)/MCL (ref 0–0.9)
EOSINOPHIL NFR BLD AUTO: 1.2 %
ERYTHROCYTE [DISTWIDTH] IN BLOOD BY AUTOMATED COUNT: 12.9 % (ref 11.5–17)
HCT VFR BLD AUTO: 35.3 % (ref 37–47)
HGB BLD-MCNC: 11.3 GM/DL (ref 12–16)
IMM GRANULOCYTES # BLD AUTO: 0.01 X10(3)/MCL (ref 0–0.04)
IMM GRANULOCYTES NFR BLD AUTO: 0.2 %
LYMPHOCYTES # BLD AUTO: 0.94 X10(3)/MCL (ref 0.6–4.6)
LYMPHOCYTES NFR BLD AUTO: 22 %
MCH RBC QN AUTO: 35.8 PG (ref 27–31)
MCHC RBC AUTO-ENTMCNC: 32 MG/DL (ref 33–36)
MCV RBC AUTO: 111.7 FL (ref 80–94)
MONOCYTES # BLD AUTO: 0.54 X10(3)/MCL (ref 0.1–1.3)
MONOCYTES NFR BLD AUTO: 12.6 %
NEUTROPHILS # BLD AUTO: 2.7 X10(3)/MCL (ref 2.1–9.2)
NEUTROPHILS NFR BLD AUTO: 62.6 %
PLATELET # BLD AUTO: 632 X10(3)/MCL (ref 130–400)
PMV BLD AUTO: 8.8 FL (ref 7.4–10.4)
RBC # BLD AUTO: 3.16 X10(6)/MCL (ref 4.2–5.4)
WBC # SPEC AUTO: 4.3 X10(3)/MCL (ref 4.5–11.5)

## 2022-10-20 PROCEDURE — 36415 COLL VENOUS BLD VENIPUNCTURE: CPT

## 2022-10-20 PROCEDURE — 85025 COMPLETE CBC W/AUTO DIFF WBC: CPT

## 2022-10-27 ENCOUNTER — LAB VISIT (OUTPATIENT)
Dept: LAB | Facility: HOSPITAL | Age: 72
End: 2022-10-27
Attending: INTERNAL MEDICINE
Payer: MEDICARE

## 2022-10-27 DIAGNOSIS — D47.3 ESSENTIAL THROMBOCYTOSIS: ICD-10-CM

## 2022-10-27 DIAGNOSIS — D72.819 LEUKOPENIA, UNSPECIFIED TYPE: ICD-10-CM

## 2022-10-27 LAB
BASOPHILS # BLD AUTO: 0.05 X10(3)/MCL (ref 0–0.2)
BASOPHILS NFR BLD AUTO: 1.1 %
EOSINOPHIL # BLD AUTO: 0.04 X10(3)/MCL (ref 0–0.9)
EOSINOPHIL NFR BLD AUTO: 0.9 %
ERYTHROCYTE [DISTWIDTH] IN BLOOD BY AUTOMATED COUNT: 12.3 % (ref 11.5–17)
HCT VFR BLD AUTO: 35.3 % (ref 37–47)
HGB BLD-MCNC: 11.5 GM/DL (ref 12–16)
IMM GRANULOCYTES # BLD AUTO: 0.01 X10(3)/MCL (ref 0–0.04)
IMM GRANULOCYTES NFR BLD AUTO: 0.2 %
LYMPHOCYTES # BLD AUTO: 0.85 X10(3)/MCL (ref 0.6–4.6)
LYMPHOCYTES NFR BLD AUTO: 19.5 %
MCH RBC QN AUTO: 35.9 PG (ref 27–31)
MCHC RBC AUTO-ENTMCNC: 32.6 MG/DL (ref 33–36)
MCV RBC AUTO: 110.3 FL (ref 80–94)
MONOCYTES # BLD AUTO: 0.35 X10(3)/MCL (ref 0.1–1.3)
MONOCYTES NFR BLD AUTO: 8 %
NEUTROPHILS # BLD AUTO: 3.1 X10(3)/MCL (ref 2.1–9.2)
NEUTROPHILS NFR BLD AUTO: 70.3 %
PLATELET # BLD AUTO: 658 X10(3)/MCL (ref 130–400)
PMV BLD AUTO: 8.7 FL (ref 7.4–10.4)
RBC # BLD AUTO: 3.2 X10(6)/MCL (ref 4.2–5.4)
WBC # SPEC AUTO: 4.4 X10(3)/MCL (ref 4.5–11.5)

## 2022-10-27 PROCEDURE — 85025 COMPLETE CBC W/AUTO DIFF WBC: CPT

## 2022-10-27 PROCEDURE — 36415 COLL VENOUS BLD VENIPUNCTURE: CPT

## 2022-11-03 ENCOUNTER — LAB VISIT (OUTPATIENT)
Dept: LAB | Facility: HOSPITAL | Age: 72
End: 2022-11-03
Attending: INTERNAL MEDICINE
Payer: MEDICARE

## 2022-11-03 DIAGNOSIS — D72.819 LEUKOPENIA, UNSPECIFIED TYPE: ICD-10-CM

## 2022-11-03 DIAGNOSIS — D47.3 ESSENTIAL THROMBOCYTOSIS: ICD-10-CM

## 2022-11-03 LAB
BASOPHILS # BLD AUTO: 0.05 X10(3)/MCL (ref 0–0.2)
BASOPHILS NFR BLD AUTO: 1.3 %
EOSINOPHIL # BLD AUTO: 0.03 X10(3)/MCL (ref 0–0.9)
EOSINOPHIL NFR BLD AUTO: 0.8 %
ERYTHROCYTE [DISTWIDTH] IN BLOOD BY AUTOMATED COUNT: 11.9 % (ref 11.5–17)
HCT VFR BLD AUTO: 34.6 % (ref 37–47)
HGB BLD-MCNC: 11.3 GM/DL (ref 12–16)
IMM GRANULOCYTES # BLD AUTO: 0 X10(3)/MCL (ref 0–0.04)
IMM GRANULOCYTES NFR BLD AUTO: 0 %
LYMPHOCYTES # BLD AUTO: 0.81 X10(3)/MCL (ref 0.6–4.6)
LYMPHOCYTES NFR BLD AUTO: 21.6 %
MCH RBC QN AUTO: 35.5 PG (ref 27–31)
MCHC RBC AUTO-ENTMCNC: 32.7 MG/DL (ref 33–36)
MCV RBC AUTO: 108.8 FL (ref 80–94)
MONOCYTES # BLD AUTO: 0.26 X10(3)/MCL (ref 0.1–1.3)
MONOCYTES NFR BLD AUTO: 6.9 %
NEUTROPHILS # BLD AUTO: 2.6 X10(3)/MCL (ref 2.1–9.2)
NEUTROPHILS NFR BLD AUTO: 69.4 %
PLATELET # BLD AUTO: 688 X10(3)/MCL (ref 130–400)
PMV BLD AUTO: 8.5 FL (ref 7.4–10.4)
RBC # BLD AUTO: 3.18 X10(6)/MCL (ref 4.2–5.4)
WBC # SPEC AUTO: 3.8 X10(3)/MCL (ref 4.5–11.5)

## 2022-11-03 PROCEDURE — 85025 COMPLETE CBC W/AUTO DIFF WBC: CPT

## 2022-11-03 PROCEDURE — 36415 COLL VENOUS BLD VENIPUNCTURE: CPT

## 2022-11-10 ENCOUNTER — OFFICE VISIT (OUTPATIENT)
Dept: HEMATOLOGY/ONCOLOGY | Facility: CLINIC | Age: 72
End: 2022-11-10
Payer: MEDICARE

## 2022-11-10 ENCOUNTER — LAB VISIT (OUTPATIENT)
Dept: LAB | Facility: HOSPITAL | Age: 72
End: 2022-11-10
Attending: INTERNAL MEDICINE
Payer: MEDICARE

## 2022-11-10 VITALS — BODY MASS INDEX: 29.38 KG/M2 | HEIGHT: 66 IN

## 2022-11-10 DIAGNOSIS — D47.3 ESSENTIAL THROMBOCYTOSIS: ICD-10-CM

## 2022-11-10 DIAGNOSIS — Z51.81 THERAPEUTIC DRUG MONITORING: ICD-10-CM

## 2022-11-10 DIAGNOSIS — D72.819 LEUKOPENIA, UNSPECIFIED TYPE: ICD-10-CM

## 2022-11-10 DIAGNOSIS — D47.3 ESSENTIAL THROMBOCYTOSIS: Primary | ICD-10-CM

## 2022-11-10 DIAGNOSIS — D64.9 ANEMIA, UNSPECIFIED TYPE: ICD-10-CM

## 2022-11-10 LAB
ALBUMIN SERPL-MCNC: 3.8 GM/DL (ref 3.4–4.8)
ALBUMIN/GLOB SERPL: 1.3 RATIO (ref 1.1–2)
ALP SERPL-CCNC: 80 UNIT/L (ref 40–150)
ALT SERPL-CCNC: 31 UNIT/L (ref 0–55)
AST SERPL-CCNC: 39 UNIT/L (ref 5–34)
BASOPHILS # BLD AUTO: 0.02 X10(3)/MCL (ref 0–0.2)
BASOPHILS NFR BLD AUTO: 0.3 %
BILIRUBIN DIRECT+TOT PNL SERPL-MCNC: 0.7 MG/DL
BUN SERPL-MCNC: 11.8 MG/DL (ref 9.8–20.1)
CALCIUM SERPL-MCNC: 9.2 MG/DL (ref 8.4–10.2)
CHLORIDE SERPL-SCNC: 106 MMOL/L (ref 98–107)
CO2 SERPL-SCNC: 27 MMOL/L (ref 23–31)
CREAT SERPL-MCNC: 0.72 MG/DL (ref 0.55–1.02)
EOSINOPHIL # BLD AUTO: 0.04 X10(3)/MCL (ref 0–0.9)
EOSINOPHIL NFR BLD AUTO: 0.5 %
ERYTHROCYTE [DISTWIDTH] IN BLOOD BY AUTOMATED COUNT: 11.8 % (ref 11.5–17)
GFR SERPLBLD CREATININE-BSD FMLA CKD-EPI: >60 MLS/MIN/1.73/M2
GLOBULIN SER-MCNC: 3 GM/DL (ref 2.4–3.5)
GLUCOSE SERPL-MCNC: 106 MG/DL (ref 82–115)
HCT VFR BLD AUTO: 32.7 % (ref 37–47)
HGB BLD-MCNC: 10.5 GM/DL (ref 12–16)
IMM GRANULOCYTES # BLD AUTO: 0.03 X10(3)/MCL (ref 0–0.04)
IMM GRANULOCYTES NFR BLD AUTO: 0.4 %
LYMPHOCYTES # BLD AUTO: 0.94 X10(3)/MCL (ref 0.6–4.6)
LYMPHOCYTES NFR BLD AUTO: 11.9 %
MCH RBC QN AUTO: 35.6 PG (ref 27–31)
MCHC RBC AUTO-ENTMCNC: 32.1 MG/DL (ref 33–36)
MCV RBC AUTO: 110.8 FL (ref 80–94)
MONOCYTES # BLD AUTO: 0.72 X10(3)/MCL (ref 0.1–1.3)
MONOCYTES NFR BLD AUTO: 9.1 %
NEUTROPHILS # BLD AUTO: 6.1 X10(3)/MCL (ref 2.1–9.2)
NEUTROPHILS NFR BLD AUTO: 77.8 %
PLATELET # BLD AUTO: 648 X10(3)/MCL (ref 130–400)
PMV BLD AUTO: 8.7 FL (ref 7.4–10.4)
POTASSIUM SERPL-SCNC: 4.5 MMOL/L (ref 3.5–5.1)
PROT SERPL-MCNC: 6.8 GM/DL (ref 5.8–7.6)
RBC # BLD AUTO: 2.95 X10(6)/MCL (ref 4.2–5.4)
SODIUM SERPL-SCNC: 139 MMOL/L (ref 136–145)
WBC # SPEC AUTO: 7.9 X10(3)/MCL (ref 4.5–11.5)

## 2022-11-10 PROCEDURE — 1126F AMNT PAIN NOTED NONE PRSNT: CPT | Mod: CPTII,S$GLB,, | Performed by: INTERNAL MEDICINE

## 2022-11-10 PROCEDURE — 4010F PR ACE/ARB THEARPY RXD/TAKEN: ICD-10-PCS | Mod: CPTII,S$GLB,, | Performed by: INTERNAL MEDICINE

## 2022-11-10 PROCEDURE — 3288F FALL RISK ASSESSMENT DOCD: CPT | Mod: CPTII,S$GLB,, | Performed by: INTERNAL MEDICINE

## 2022-11-10 PROCEDURE — 99215 PR OFFICE/OUTPT VISIT, EST, LEVL V, 40-54 MIN: ICD-10-PCS | Mod: S$GLB,,, | Performed by: INTERNAL MEDICINE

## 2022-11-10 PROCEDURE — 3288F PR FALLS RISK ASSESSMENT DOCUMENTED: ICD-10-PCS | Mod: CPTII,S$GLB,, | Performed by: INTERNAL MEDICINE

## 2022-11-10 PROCEDURE — 3008F BODY MASS INDEX DOCD: CPT | Mod: CPTII,S$GLB,, | Performed by: INTERNAL MEDICINE

## 2022-11-10 PROCEDURE — 99999 PR PBB SHADOW E&M-EST. PATIENT-LVL III: CPT | Mod: PBBFAC,,, | Performed by: INTERNAL MEDICINE

## 2022-11-10 PROCEDURE — 36415 COLL VENOUS BLD VENIPUNCTURE: CPT

## 2022-11-10 PROCEDURE — 4010F ACE/ARB THERAPY RXD/TAKEN: CPT | Mod: CPTII,S$GLB,, | Performed by: INTERNAL MEDICINE

## 2022-11-10 PROCEDURE — 85025 COMPLETE CBC W/AUTO DIFF WBC: CPT

## 2022-11-10 PROCEDURE — 1126F PR PAIN SEVERITY QUANTIFIED, NO PAIN PRESENT: ICD-10-PCS | Mod: CPTII,S$GLB,, | Performed by: INTERNAL MEDICINE

## 2022-11-10 PROCEDURE — 99215 OFFICE O/P EST HI 40 MIN: CPT | Mod: S$GLB,,, | Performed by: INTERNAL MEDICINE

## 2022-11-10 PROCEDURE — 1160F RVW MEDS BY RX/DR IN RCRD: CPT | Mod: CPTII,S$GLB,, | Performed by: INTERNAL MEDICINE

## 2022-11-10 PROCEDURE — 3008F PR BODY MASS INDEX (BMI) DOCUMENTED: ICD-10-PCS | Mod: CPTII,S$GLB,, | Performed by: INTERNAL MEDICINE

## 2022-11-10 PROCEDURE — 1160F PR REVIEW ALL MEDS BY PRESCRIBER/CLIN PHARMACIST DOCUMENTED: ICD-10-PCS | Mod: CPTII,S$GLB,, | Performed by: INTERNAL MEDICINE

## 2022-11-10 PROCEDURE — 80053 COMPREHEN METABOLIC PANEL: CPT

## 2022-11-10 PROCEDURE — 1159F MED LIST DOCD IN RCRD: CPT | Mod: CPTII,S$GLB,, | Performed by: INTERNAL MEDICINE

## 2022-11-10 PROCEDURE — 1101F PT FALLS ASSESS-DOCD LE1/YR: CPT | Mod: CPTII,S$GLB,, | Performed by: INTERNAL MEDICINE

## 2022-11-10 PROCEDURE — 1159F PR MEDICATION LIST DOCUMENTED IN MEDICAL RECORD: ICD-10-PCS | Mod: CPTII,S$GLB,, | Performed by: INTERNAL MEDICINE

## 2022-11-10 PROCEDURE — 1101F PR PT FALLS ASSESS DOC 0-1 FALLS W/OUT INJ PAST YR: ICD-10-PCS | Mod: CPTII,S$GLB,, | Performed by: INTERNAL MEDICINE

## 2022-11-10 PROCEDURE — 99999 PR PBB SHADOW E&M-EST. PATIENT-LVL III: ICD-10-PCS | Mod: PBBFAC,,, | Performed by: INTERNAL MEDICINE

## 2022-11-10 NOTE — PROGRESS NOTES
HEMATOLOGY/ONCOLOGY OFFICE CLINIC VISIT    Visit Information:  Visit type:  On-going care, Review/discussion of tests, Scheduled follow-up.    Accompanied by:  No one.    Source of history:  Self.    Referral source:  Roc GIFFORD, Isaac HUANG    History limitation:  None.        Initial Consultation: 3/28/2022  Referring Physician: Dr Brian  Other Physicians:  Code Status: Not addressed    Diagnosis/Problem list:   Essential Thrombocytosis  --CALR mutation analysis, exon 9: Positive.    Present Treatment:  Hydrea 500 mg Daily + Agrylin 1 mg daily.     Treatment history:    Hydrea       Imaging:      Pathology:  March 21, 2022 15:21 Peripheral blood, CALR mutation analysis, exon 9: Positive. 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 Peripheral blood, JAK2 V617F mutation analysis: Negative for JAK2 V617F.    CLINICAL HISTORY:       Patient: 72 years old female kindly referred for thrombocytosis.     Reviewing electronic on medical record it seems like her platelets were normal up to March 2018.  In March 2019 platelets were mildly elevated 496,009 slowly there were trending up with the most recent on 3/15/2022 of 1,012,000.  She is taking baby aspirin.    March 21, 2022 15:21 Peripheral blood, CALR mutation analysis, exon 9: Positive. 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  Peripheral blood, JAK2 V617F mutation analysis: Negative for JAK2 V617F.    She denies any family history of blood disorders or malignancies.        Chief Complaint: No Concerns today  Here for ET    Interval History:  Patient presents today for 4 week follow up appointment.  She is taking Hydrea 500 mg daily and Agrylin 1 mg daily. She is tolerating with no side effects except for fatigue. Otherwise, she is doing well. She denies any fever, chills, sweats.  No chest pain or shortness of breath.  No bleeding.     No headaches or changes in vision. No neurologic deficit  Labs drawn today, PLT count is 648k, Hgb  "is 10.5.    ROS:  All 14 points ROS taken and as per Interval History    Histories:  PMH/PSH/FH/SOCIAL/ALLERGIES AND MEDS REVIEWED AND UPDATED AS APPROPRIATE       Vitals:    11/10/22 0916   Height: 5' 6" (1.676 m)          Physical Exam  Vitals and nursing note reviewed.   Constitutional:       General: She is not in acute distress.     Appearance: Normal appearance. She is well-developed.   HENT:      Head: Normocephalic and atraumatic.      Mouth/Throat:      Mouth: Mucous membranes are moist.   Eyes:      General: No scleral icterus.     Extraocular Movements: Extraocular movements intact.      Conjunctiva/sclera: Conjunctivae normal.      Pupils: Pupils are equal, round, and reactive to light.   Neck:      Vascular: No JVD.   Cardiovascular:      Rate and Rhythm: Normal rate and regular rhythm.      Heart sounds: No murmur heard.  Pulmonary:      Effort: Pulmonary effort is normal.      Breath sounds: Normal breath sounds. No wheezing or rhonchi.   Chest:      Chest wall: No deformity or tenderness.   Breasts:     Right: No swelling, mass, nipple discharge, skin change or tenderness.      Left: No swelling, mass, nipple discharge, skin change or tenderness.   Abdominal:      General: Bowel sounds are normal. There is no distension.      Palpations: Abdomen is soft. There is no splenomegaly or mass.      Tenderness: There is no abdominal tenderness.   Musculoskeletal:         General: No swelling or deformity.      Cervical back: Neck supple.   Lymphadenopathy:      Cervical: No cervical adenopathy.      Upper Body:      Right upper body: No supraclavicular or axillary adenopathy.      Left upper body: No supraclavicular or axillary adenopathy.      Lower Body: No right inguinal adenopathy. No left inguinal adenopathy.   Skin:     General: Skin is warm.      Coloration: Skin is not jaundiced.      Findings: No lesion or rash.      Nails: There is no clubbing.   Neurological:      General: No focal deficit " present.      Mental Status: She is alert and oriented to person, place, and time.      Sensory: Sensation is intact.      Motor: Motor function is intact.      Gait: Gait is intact.   Psychiatric:         Attention and Perception: Attention normal.         Mood and Affect: Mood and affect normal.         Speech: Speech normal.         Behavior: Behavior is cooperative.         Thought Content: Thought content normal.         Cognition and Memory: Cognition normal.         Judgment: Judgment normal.     ECOG SCORE    0 - Fully active-able to carry on all pre-disease performance without restriction         Laboratory:  CBC with Differential:  Lab Results   Component Value Date    WBC 7.9 11/10/2022    RBC 2.95 (L) 11/10/2022    HGB 10.5 (L) 11/10/2022    HCT 32.7 (L) 11/10/2022    .8 (H) 11/10/2022    MCH 35.6 (H) 11/10/2022    MCHC 32.1 (L) 11/10/2022    RDW 11.8 11/10/2022     (H) 11/10/2022    MPV 8.7 11/10/2022       Latest Reference Range & Units 05/23/22 08:40 06/09/22 07:37 06/23/22 11:27 07/07/22 07:27 07/21/22 11:18 08/04/22 08:10 08/18/22 10:52   WBC 4.5 - 11.5 x10(3)/mcL 2.6 (L) 3.3 (L) 4.0 (L) 3.1 (L) 3.4 (L) 3.5 (L) 4.1 (L)   (L): Data is abnormally low     Reference Range & Units 05/23/22 08:40 06/09/22 07:37 06/23/22 11:27 07/07/22 07:27 07/21/22 11:18 08/04/22 08:10 08/18/22 10:52   Platelets 130 - 400 x10(3)/mcL 449 (H) 557 (H) 612 (H) 623 (H) 646 (H) 673 (H) 590 (H)   (H): Data is abnormally high   Reference Range & Units 05/23/22 08:40 06/09/22 07:37 06/23/22 11:27 07/07/22 07:27 07/21/22 11:18 08/04/22 08:10 08/18/22 10:52   Hemoglobin 12.0 - 16.0 gm/dL 10.5 (L) 10.6 (L) 10.8 (L) 10.9 (L) 11.0 (L) 10.8 (L) 11.3 (L)   (L): Data is abnormally low    CMP:  Sodium Level   Date Value Ref Range Status   11/10/2022 139 136 - 145 mmol/L Final     Potassium Level   Date Value Ref Range Status   11/10/2022 4.5 3.5 - 5.1 mmol/L Final     Carbon Dioxide   Date Value Ref Range Status   11/10/2022  27 23 - 31 mmol/L Final     Blood Urea Nitrogen   Date Value Ref Range Status   11/10/2022 11.8 9.8 - 20.1 mg/dL Final     Creatinine   Date Value Ref Range Status   11/10/2022 0.72 0.55 - 1.02 mg/dL Final     Calcium Level Total   Date Value Ref Range Status   11/10/2022 9.2 8.4 - 10.2 mg/dL Final     Albumin Level   Date Value Ref Range Status   11/10/2022 3.8 3.4 - 4.8 gm/dL Final     Bilirubin Total   Date Value Ref Range Status   11/10/2022 0.7 <=1.5 mg/dL Final     Alkaline Phosphatase   Date Value Ref Range Status   11/10/2022 80 40 - 150 unit/L Final     Aspartate Aminotransferase   Date Value Ref Range Status   11/10/2022 39 (H) 5 - 34 unit/L Final     Alanine Aminotransferase   Date Value Ref Range Status   11/10/2022 31 0 - 55 unit/L Final     Estimated GFR-Non    Date Value Ref Range Status   08/04/2022 >60 mls/min/1.73/m2 Final         Assessment:         1. Essential thrombocytosis    2. Anemia, unspecified type    3. Therapeutic drug monitoring      Essential thrombocytosis -- CALR mutation positive, 52 bp deletion Exon 9   --on Hydrea and Agrylin  Anemia-due to therapy  Leukopenia-due to therapy and resolved    Patient reports that she is compliant with Hydrea.  I will add Agrylin as I have feared that increase in the Hydrea will worsen her anemia and leukopenia making her more susceptible to infection.      Plan:       Thrombocytosis persists. Now on Agrylin 1 mg  daily and Hydrea 500 mg daily.  Will not make any adjustment at this time as she is anemic and fatigue is not better.  Emphasized continuation of daily baby aspirin. Will continue present management for now.        Continue Hydrea  500 mg daily and Agrylin to 1 mg daily, will adjust depending on her counts.      Cont ASA 81 mg daily  RTC 4 weeks with NP, CBC, CMP same day  Encouraged to call for any questions or problems  The patient was given ample opportunity to ask questions and they were all answered to satisfaction;  patient demonstrated understanding of what we discussed and is agreeable to the plan.    HANH QUEZADA MD      Professional Services   I, Lena Murrieta LPN, acted solely as a scribe for and in the presence of Dr. Hanh Quezada, who performed these services.

## 2022-12-07 ENCOUNTER — OFFICE VISIT (OUTPATIENT)
Dept: HEMATOLOGY/ONCOLOGY | Facility: CLINIC | Age: 72
End: 2022-12-07
Payer: MEDICARE

## 2022-12-07 ENCOUNTER — LAB VISIT (OUTPATIENT)
Dept: LAB | Facility: HOSPITAL | Age: 72
End: 2022-12-07
Attending: INTERNAL MEDICINE
Payer: MEDICARE

## 2022-12-07 VITALS
HEART RATE: 61 BPM | TEMPERATURE: 98 F | DIASTOLIC BLOOD PRESSURE: 79 MMHG | SYSTOLIC BLOOD PRESSURE: 122 MMHG | BODY MASS INDEX: 28.28 KG/M2 | WEIGHT: 176 LBS | OXYGEN SATURATION: 98 % | HEIGHT: 66 IN

## 2022-12-07 DIAGNOSIS — D47.3 ESSENTIAL THROMBOCYTOSIS: ICD-10-CM

## 2022-12-07 DIAGNOSIS — Z51.81 THERAPEUTIC DRUG MONITORING: ICD-10-CM

## 2022-12-07 DIAGNOSIS — D64.9 ANEMIA, UNSPECIFIED TYPE: ICD-10-CM

## 2022-12-07 DIAGNOSIS — D47.3 ESSENTIAL THROMBOCYTOSIS: Primary | ICD-10-CM

## 2022-12-07 DIAGNOSIS — D72.819 LEUKOPENIA, UNSPECIFIED TYPE: ICD-10-CM

## 2022-12-07 LAB
ALBUMIN SERPL-MCNC: 4 GM/DL (ref 3.4–4.8)
ALBUMIN/GLOB SERPL: 1.3 RATIO (ref 1.1–2)
ALP SERPL-CCNC: 84 UNIT/L (ref 40–150)
ALT SERPL-CCNC: 12 UNIT/L (ref 0–55)
AST SERPL-CCNC: 20 UNIT/L (ref 5–34)
BASOPHILS # BLD AUTO: 0.04 X10(3)/MCL (ref 0–0.2)
BASOPHILS NFR BLD AUTO: 1 %
BILIRUBIN DIRECT+TOT PNL SERPL-MCNC: 0.5 MG/DL
BUN SERPL-MCNC: 24.9 MG/DL (ref 9.8–20.1)
CALCIUM SERPL-MCNC: 9.5 MG/DL (ref 8.4–10.2)
CHLORIDE SERPL-SCNC: 106 MMOL/L (ref 98–107)
CO2 SERPL-SCNC: 26 MMOL/L (ref 23–31)
CREAT SERPL-MCNC: 0.74 MG/DL (ref 0.55–1.02)
EOSINOPHIL # BLD AUTO: 0.05 X10(3)/MCL (ref 0–0.9)
EOSINOPHIL NFR BLD AUTO: 1.2 %
ERYTHROCYTE [DISTWIDTH] IN BLOOD BY AUTOMATED COUNT: 12.7 % (ref 11.5–17)
GFR SERPLBLD CREATININE-BSD FMLA CKD-EPI: >60 MLS/MIN/1.73/M2
GLOBULIN SER-MCNC: 3.1 GM/DL (ref 2.4–3.5)
GLUCOSE SERPL-MCNC: 94 MG/DL (ref 82–115)
HCT VFR BLD AUTO: 33.5 % (ref 37–47)
HGB BLD-MCNC: 10.7 GM/DL (ref 12–16)
IMM GRANULOCYTES # BLD AUTO: 0.01 X10(3)/MCL (ref 0–0.04)
IMM GRANULOCYTES NFR BLD AUTO: 0.2 %
LYMPHOCYTES # BLD AUTO: 1.1 X10(3)/MCL (ref 0.6–4.6)
LYMPHOCYTES NFR BLD AUTO: 26.4 %
MCH RBC QN AUTO: 34.3 PG (ref 27–31)
MCHC RBC AUTO-ENTMCNC: 31.9 MG/DL (ref 33–36)
MCV RBC AUTO: 107.4 FL (ref 80–94)
MONOCYTES # BLD AUTO: 0.33 X10(3)/MCL (ref 0.1–1.3)
MONOCYTES NFR BLD AUTO: 7.9 %
NEUTROPHILS # BLD AUTO: 2.6 X10(3)/MCL (ref 2.1–9.2)
NEUTROPHILS NFR BLD AUTO: 63.3 %
PLATELET # BLD AUTO: 694 X10(3)/MCL (ref 130–400)
PMV BLD AUTO: 8.9 FL (ref 7.4–10.4)
POTASSIUM SERPL-SCNC: 5.1 MMOL/L (ref 3.5–5.1)
PROT SERPL-MCNC: 7.1 GM/DL (ref 5.8–7.6)
RBC # BLD AUTO: 3.12 X10(6)/MCL (ref 4.2–5.4)
SODIUM SERPL-SCNC: 139 MMOL/L (ref 136–145)
WBC # SPEC AUTO: 4.2 X10(3)/MCL (ref 4.5–11.5)

## 2022-12-07 PROCEDURE — 3288F PR FALLS RISK ASSESSMENT DOCUMENTED: ICD-10-PCS | Mod: CPTII,S$GLB,, | Performed by: NURSE PRACTITIONER

## 2022-12-07 PROCEDURE — 3074F PR MOST RECENT SYSTOLIC BLOOD PRESSURE < 130 MM HG: ICD-10-PCS | Mod: CPTII,S$GLB,, | Performed by: NURSE PRACTITIONER

## 2022-12-07 PROCEDURE — 36415 COLL VENOUS BLD VENIPUNCTURE: CPT

## 2022-12-07 PROCEDURE — 4010F PR ACE/ARB THEARPY RXD/TAKEN: ICD-10-PCS | Mod: CPTII,S$GLB,, | Performed by: NURSE PRACTITIONER

## 2022-12-07 PROCEDURE — 1101F PT FALLS ASSESS-DOCD LE1/YR: CPT | Mod: CPTII,S$GLB,, | Performed by: NURSE PRACTITIONER

## 2022-12-07 PROCEDURE — 85025 COMPLETE CBC W/AUTO DIFF WBC: CPT

## 2022-12-07 PROCEDURE — 3078F DIAST BP <80 MM HG: CPT | Mod: CPTII,S$GLB,, | Performed by: NURSE PRACTITIONER

## 2022-12-07 PROCEDURE — 4010F ACE/ARB THERAPY RXD/TAKEN: CPT | Mod: CPTII,S$GLB,, | Performed by: NURSE PRACTITIONER

## 2022-12-07 PROCEDURE — 1159F PR MEDICATION LIST DOCUMENTED IN MEDICAL RECORD: ICD-10-PCS | Mod: CPTII,S$GLB,, | Performed by: NURSE PRACTITIONER

## 2022-12-07 PROCEDURE — 1126F AMNT PAIN NOTED NONE PRSNT: CPT | Mod: CPTII,S$GLB,, | Performed by: NURSE PRACTITIONER

## 2022-12-07 PROCEDURE — 3074F SYST BP LT 130 MM HG: CPT | Mod: CPTII,S$GLB,, | Performed by: NURSE PRACTITIONER

## 2022-12-07 PROCEDURE — 1126F PR PAIN SEVERITY QUANTIFIED, NO PAIN PRESENT: ICD-10-PCS | Mod: CPTII,S$GLB,, | Performed by: NURSE PRACTITIONER

## 2022-12-07 PROCEDURE — 3078F PR MOST RECENT DIASTOLIC BLOOD PRESSURE < 80 MM HG: ICD-10-PCS | Mod: CPTII,S$GLB,, | Performed by: NURSE PRACTITIONER

## 2022-12-07 PROCEDURE — 99999 PR PBB SHADOW E&M-EST. PATIENT-LVL III: CPT | Mod: PBBFAC,,, | Performed by: NURSE PRACTITIONER

## 2022-12-07 PROCEDURE — 99999 PR PBB SHADOW E&M-EST. PATIENT-LVL III: ICD-10-PCS | Mod: PBBFAC,,, | Performed by: NURSE PRACTITIONER

## 2022-12-07 PROCEDURE — 3008F PR BODY MASS INDEX (BMI) DOCUMENTED: ICD-10-PCS | Mod: CPTII,S$GLB,, | Performed by: NURSE PRACTITIONER

## 2022-12-07 PROCEDURE — 99214 PR OFFICE/OUTPT VISIT, EST, LEVL IV, 30-39 MIN: ICD-10-PCS | Mod: S$GLB,,, | Performed by: NURSE PRACTITIONER

## 2022-12-07 PROCEDURE — 99214 OFFICE O/P EST MOD 30 MIN: CPT | Mod: S$GLB,,, | Performed by: NURSE PRACTITIONER

## 2022-12-07 PROCEDURE — 3008F BODY MASS INDEX DOCD: CPT | Mod: CPTII,S$GLB,, | Performed by: NURSE PRACTITIONER

## 2022-12-07 PROCEDURE — 1101F PR PT FALLS ASSESS DOC 0-1 FALLS W/OUT INJ PAST YR: ICD-10-PCS | Mod: CPTII,S$GLB,, | Performed by: NURSE PRACTITIONER

## 2022-12-07 PROCEDURE — 80053 COMPREHEN METABOLIC PANEL: CPT

## 2022-12-07 PROCEDURE — 1159F MED LIST DOCD IN RCRD: CPT | Mod: CPTII,S$GLB,, | Performed by: NURSE PRACTITIONER

## 2022-12-07 PROCEDURE — 3288F FALL RISK ASSESSMENT DOCD: CPT | Mod: CPTII,S$GLB,, | Performed by: NURSE PRACTITIONER

## 2022-12-07 NOTE — PROGRESS NOTES
HEMATOLOGY/ONCOLOGY OFFICE CLINIC VISIT    Visit Information:  Visit type:  On-going care, Review/discussion of tests, Scheduled follow-up.    Accompanied by:  No one.    Source of history:  Self.    Referral source:  Roc GIFFORD, Isaac HUANG    History limitation:  None.        Initial Consultation: 3/28/2022  Referring Physician: Dr Brian  Other Physicians:  Code Status: Not addressed    Diagnosis/Problem list:   Essential Thrombocytosis  --CALR mutation analysis, exon 9: Positive.    Present Treatment:  Hydrea 500 mg Daily + Agrylin 1 mg daily.     Treatment history:    Hydrea       Imaging:      Pathology:  March 21, 2022 15:21 Peripheral blood, CALR mutation analysis, exon 9: Positive. 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 Peripheral blood, JAK2 V617F mutation analysis: Negative for JAK2 V617F.    CLINICAL HISTORY:       Patient: 72 years old female kindly referred for thrombocytosis.     Reviewing electronic on medical record it seems like her platelets were normal up to March 2018.  In March 2019 platelets were mildly elevated 496,009 slowly there were trending up with the most recent on 3/15/2022 of 1,012,000.  She is taking baby aspirin.    March 21, 2022 15:21 Peripheral blood, CALR mutation analysis, exon 9: Positive. 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  Peripheral blood, JAK2 V617F mutation analysis: Negative for JAK2 V617F.    She denies any family history of blood disorders or malignancies.        Chief Complaint: No Concerns today  Here for ET    Interval History:  Patient presents today for 4 week follow up appointment.  She is taking Hydrea 500 mg daily and Agrylin 1 mg daily. She is tolerating with no side effects except for fatigue. Otherwise, she is doing well. She denies any fever, chills, sweats.  No chest pain or shortness of breath.  No bleeding.     No headaches or changes in vision. No neurologic deficit. Does report anxiety due to social issues  "that overwhelm her.     ROS:  All 14 points ROS taken and as per Interval History    Histories:  PMH/PSH/FH/SOCIAL/ALLERGIES AND MEDS REVIEWED AND UPDATED AS APPROPRIATE       Vitals:    12/07/22 0922   BP: 122/79   BP Location: Left arm   Patient Position: Sitting   Pulse: 61   Temp: 97.5 °F (36.4 °C)   TempSrc: Oral   SpO2: 98%   Weight: 79.8 kg (176 lb)   Height: 5' 6" (1.676 m)          Physical Exam  Vitals and nursing note reviewed.   Constitutional:       General: She is not in acute distress.     Appearance: Normal appearance. She is well-developed.   HENT:      Head: Normocephalic and atraumatic.      Mouth/Throat:      Mouth: Mucous membranes are moist.   Eyes:      General: No scleral icterus.     Extraocular Movements: Extraocular movements intact.      Conjunctiva/sclera: Conjunctivae normal.      Pupils: Pupils are equal, round, and reactive to light.   Neck:      Vascular: No JVD.   Cardiovascular:      Rate and Rhythm: Normal rate and regular rhythm.      Heart sounds: No murmur heard.  Pulmonary:      Effort: Pulmonary effort is normal.      Breath sounds: Normal breath sounds. No wheezing or rhonchi.   Chest:      Chest wall: No deformity or tenderness.   Breasts:     Right: No swelling, mass, nipple discharge, skin change or tenderness.      Left: No swelling, mass, nipple discharge, skin change or tenderness.   Abdominal:      General: Bowel sounds are normal. There is no distension.      Palpations: Abdomen is soft. There is no splenomegaly or mass.      Tenderness: There is no abdominal tenderness.   Musculoskeletal:         General: No swelling or deformity.      Cervical back: Neck supple.   Lymphadenopathy:      Cervical: No cervical adenopathy.      Upper Body:      Right upper body: No supraclavicular or axillary adenopathy.      Left upper body: No supraclavicular or axillary adenopathy.      Lower Body: No right inguinal adenopathy. No left inguinal adenopathy.   Skin:     General: Skin " is warm.      Coloration: Skin is not jaundiced.      Findings: No lesion or rash.      Nails: There is no clubbing.   Neurological:      General: No focal deficit present.      Mental Status: She is alert and oriented to person, place, and time.      Sensory: Sensation is intact.      Motor: Motor function is intact.      Gait: Gait is intact.   Psychiatric:         Attention and Perception: Attention normal.         Mood and Affect: Mood and affect normal.         Speech: Speech normal.         Behavior: Behavior is cooperative.         Thought Content: Thought content normal.         Cognition and Memory: Cognition normal.         Judgment: Judgment normal.     ECOG SCORE             Laboratory:  CBC with Differential:  Lab Results   Component Value Date    WBC 4.2 (L) 12/07/2022    RBC 3.12 (L) 12/07/2022    HGB 10.7 (L) 12/07/2022    HCT 33.5 (L) 12/07/2022    .4 (H) 12/07/2022    MCH 34.3 (H) 12/07/2022    MCHC 31.9 (L) 12/07/2022    RDW 12.7 12/07/2022     (H) 12/07/2022    MPV 8.9 12/07/2022       Latest Reference Range & Units 05/23/22 08:40 06/09/22 07:37 06/23/22 11:27 07/07/22 07:27 07/21/22 11:18 08/04/22 08:10 08/18/22 10:52   WBC 4.5 - 11.5 x10(3)/mcL 2.6 (L) 3.3 (L) 4.0 (L) 3.1 (L) 3.4 (L) 3.5 (L) 4.1 (L)   (L): Data is abnormally low     Reference Range & Units 05/23/22 08:40 06/09/22 07:37 06/23/22 11:27 07/07/22 07:27 07/21/22 11:18 08/04/22 08:10 08/18/22 10:52   Platelets 130 - 400 x10(3)/mcL 449 (H) 557 (H) 612 (H) 623 (H) 646 (H) 673 (H) 590 (H)   (H): Data is abnormally high   Reference Range & Units 05/23/22 08:40 06/09/22 07:37 06/23/22 11:27 07/07/22 07:27 07/21/22 11:18 08/04/22 08:10 08/18/22 10:52   Hemoglobin 12.0 - 16.0 gm/dL 10.5 (L) 10.6 (L) 10.8 (L) 10.9 (L) 11.0 (L) 10.8 (L) 11.3 (L)   (L): Data is abnormally low    CMP:  Sodium Level   Date Value Ref Range Status   12/07/2022 139 136 - 145 mmol/L Final     Potassium Level   Date Value Ref Range Status   12/07/2022  5.1 3.5 - 5.1 mmol/L Final     Carbon Dioxide   Date Value Ref Range Status   12/07/2022 26 23 - 31 mmol/L Final     Blood Urea Nitrogen   Date Value Ref Range Status   12/07/2022 24.9 (H) 9.8 - 20.1 mg/dL Final     Creatinine   Date Value Ref Range Status   12/07/2022 0.74 0.55 - 1.02 mg/dL Final     Calcium Level Total   Date Value Ref Range Status   12/07/2022 9.5 8.4 - 10.2 mg/dL Final     Albumin Level   Date Value Ref Range Status   12/07/2022 4.0 3.4 - 4.8 gm/dL Final     Bilirubin Total   Date Value Ref Range Status   12/07/2022 0.5 <=1.5 mg/dL Final     Alkaline Phosphatase   Date Value Ref Range Status   12/07/2022 84 40 - 150 unit/L Final     Aspartate Aminotransferase   Date Value Ref Range Status   12/07/2022 20 5 - 34 unit/L Final     Alanine Aminotransferase   Date Value Ref Range Status   12/07/2022 12 0 - 55 unit/L Final     Estimated GFR-Non    Date Value Ref Range Status   08/04/2022 >60 mls/min/1.73/m2 Final         Assessment:         1. Essential thrombocytosis    2. Leukopenia, unspecified type    3. Anemia, unspecified type    4. Therapeutic drug monitoring        Essential thrombocytosis -- CALR mutation positive, 52 bp deletion Exon 9   --on Hydrea and Agrylin  Anemia-due to therapy  Leukopenia-due to therapy       Plan:       Thrombocytosis persists. Now on Agrylin 1 mg  daily and Hydrea 500 mg daily.  Discussed with Dr. Krishnan. Will add Agrylin 1 mg on Sat/Sun only ( for a total of 2 mg on Sat/Sun only).     Emphasized continuation of daily baby aspirin. Will continue present management for now.        Continue Hydrea  500 mg daily and Agrylin to 1 mg daily +an additional dose of Agrylin 1 mg on Sat/Sun only.       Cont ASA 81 mg daily      Repeat CBC in 2 weeks  RTC 4 weeks with NP, CBC, CMP same day    Encouraged to call for any questions or problems  The patient was given ample opportunity to ask questions and they were all answered to satisfaction; patient  demonstrated understanding of what we discussed and is agreeable to the plan.    GORDON Collazo

## 2022-12-21 ENCOUNTER — LAB VISIT (OUTPATIENT)
Dept: LAB | Facility: HOSPITAL | Age: 72
End: 2022-12-21
Attending: INTERNAL MEDICINE
Payer: MEDICARE

## 2022-12-21 DIAGNOSIS — D47.3 ESSENTIAL THROMBOCYTOSIS: ICD-10-CM

## 2022-12-21 DIAGNOSIS — D72.819 LEUKOPENIA, UNSPECIFIED TYPE: ICD-10-CM

## 2022-12-21 DIAGNOSIS — Z51.81 THERAPEUTIC DRUG MONITORING: ICD-10-CM

## 2022-12-21 DIAGNOSIS — D64.9 ANEMIA, UNSPECIFIED TYPE: ICD-10-CM

## 2022-12-21 LAB
BASOPHILS # BLD AUTO: 0.06 X10(3)/MCL (ref 0–0.2)
BASOPHILS NFR BLD AUTO: 1.5 %
EOSINOPHIL # BLD AUTO: 0.05 X10(3)/MCL (ref 0–0.9)
EOSINOPHIL NFR BLD AUTO: 1.3 %
ERYTHROCYTE [DISTWIDTH] IN BLOOD BY AUTOMATED COUNT: 12.8 % (ref 11–14.5)
HCT VFR BLD AUTO: 33 % (ref 37–47)
HGB BLD-MCNC: 10.4 GM/DL (ref 12–16)
IMM GRANULOCYTES # BLD AUTO: 0 X10(3)/MCL (ref 0–0.04)
IMM GRANULOCYTES NFR BLD AUTO: 0 %
LYMPHOCYTES # BLD AUTO: 0.9 X10(3)/MCL (ref 0.6–4.6)
LYMPHOCYTES NFR BLD AUTO: 23.1 %
MCH RBC QN AUTO: 34.2 PG
MCHC RBC AUTO-ENTMCNC: 31.5 MG/DL (ref 33–36)
MCV RBC AUTO: 108.6 FL (ref 80–94)
MONOCYTES # BLD AUTO: 0.31 X10(3)/MCL (ref 0.1–1.3)
MONOCYTES NFR BLD AUTO: 7.9 %
NEUTROPHILS # BLD AUTO: 2.58 X10(3)/MCL (ref 2.1–9.2)
NEUTROPHILS NFR BLD AUTO: 66.2 %
PLATELET # BLD AUTO: 685 X10(3)/MCL (ref 140–371)
PMV BLD AUTO: 9 FL (ref 9.4–12.4)
RBC # BLD AUTO: 3.04 X10(6)/MCL (ref 4.2–5.4)
WBC # SPEC AUTO: 3.9 X10(3)/MCL (ref 4.5–11.5)

## 2022-12-21 PROCEDURE — 36415 COLL VENOUS BLD VENIPUNCTURE: CPT

## 2022-12-21 PROCEDURE — 85025 COMPLETE CBC W/AUTO DIFF WBC: CPT

## 2022-12-24 ENCOUNTER — NURSE TRIAGE (OUTPATIENT)
Dept: ADMINISTRATIVE | Facility: CLINIC | Age: 72
End: 2022-12-24
Payer: MEDICARE

## 2022-12-24 NOTE — TELEPHONE ENCOUNTER
Reason for Disposition   Caller has already spoken with another triager and has no further questions.    Additional Information   Negative: Caller is angry or rude (e.g., hangs up, verbally abusive, yelling)   Negative: Caller hangs up   Negative: Caller has already spoken with the PCP and has no further questions.    Protocols used: No Contact or Duplicate Contact Call-A-  Pt spoke with another triager

## 2022-12-24 NOTE — TELEPHONE ENCOUNTER
Patients pharmacy closed, will need RX sent to a [facility] in [place]. Advised I would reach out to on call and speak with them. Verb understanding.       No provider on call listed at [facility]. Calling hospital,spoke with house sup, transferred to ED. Spoke with ED call nurse, advised to reach out to [facility]. Spoke with Jannette, all information given, pharmacy and drug, AGRYLIN re refills left, patient out and her pharmacy closed. She will have Lluvia the PETER on call, reach out to patient to address matter.      Reason for Disposition   [1] Prescription refill request for ESSENTIAL medicine (i.e., likelihood of harm to patient if not taken) AND [2] triager unable to refill per department policy    Protocols used: Medication Refill and Renewal Call-A-

## 2023-01-04 ENCOUNTER — OFFICE VISIT (OUTPATIENT)
Dept: HEMATOLOGY/ONCOLOGY | Facility: CLINIC | Age: 73
End: 2023-01-04
Payer: MEDICARE

## 2023-01-04 ENCOUNTER — LAB VISIT (OUTPATIENT)
Dept: LAB | Facility: HOSPITAL | Age: 73
End: 2023-01-04
Attending: INTERNAL MEDICINE
Payer: MEDICARE

## 2023-01-04 VITALS
TEMPERATURE: 98 F | HEIGHT: 66 IN | HEART RATE: 73 BPM | DIASTOLIC BLOOD PRESSURE: 90 MMHG | OXYGEN SATURATION: 98 % | SYSTOLIC BLOOD PRESSURE: 137 MMHG | WEIGHT: 179 LBS | BODY MASS INDEX: 28.77 KG/M2

## 2023-01-04 DIAGNOSIS — Z51.81 THERAPEUTIC DRUG MONITORING: ICD-10-CM

## 2023-01-04 DIAGNOSIS — D47.3 ESSENTIAL THROMBOCYTOSIS: ICD-10-CM

## 2023-01-04 DIAGNOSIS — D47.3 ESSENTIAL THROMBOCYTOSIS: Primary | ICD-10-CM

## 2023-01-04 DIAGNOSIS — D72.819 LEUKOPENIA, UNSPECIFIED TYPE: ICD-10-CM

## 2023-01-04 DIAGNOSIS — D64.9 ANEMIA, UNSPECIFIED TYPE: ICD-10-CM

## 2023-01-04 LAB
ALBUMIN SERPL-MCNC: 4.2 G/DL (ref 3.4–4.8)
ALBUMIN/GLOB SERPL: 1.3 RATIO (ref 1.1–2)
ALP SERPL-CCNC: 86 UNIT/L (ref 40–150)
ALT SERPL-CCNC: 12 UNIT/L (ref 0–55)
AST SERPL-CCNC: 21 UNIT/L (ref 5–34)
BASOPHILS # BLD AUTO: 0.03 X10(3)/MCL (ref 0–0.2)
BASOPHILS NFR BLD AUTO: 0.9 %
BILIRUBIN DIRECT+TOT PNL SERPL-MCNC: 0.5 MG/DL
BUN SERPL-MCNC: 9.7 MG/DL (ref 9.8–20.1)
CALCIUM SERPL-MCNC: 9.5 MG/DL (ref 8.4–10.2)
CHLORIDE SERPL-SCNC: 105 MMOL/L (ref 98–107)
CO2 SERPL-SCNC: 28 MMOL/L (ref 23–31)
CREAT SERPL-MCNC: 0.69 MG/DL (ref 0.55–1.02)
EOSINOPHIL # BLD AUTO: 0.05 X10(3)/MCL (ref 0–0.9)
EOSINOPHIL NFR BLD AUTO: 1.5 %
ERYTHROCYTE [DISTWIDTH] IN BLOOD BY AUTOMATED COUNT: 13.3 % (ref 11–14.5)
GFR SERPLBLD CREATININE-BSD FMLA CKD-EPI: >90 MLS/MIN/1.73/M2
GLOBULIN SER-MCNC: 3.3 GM/DL (ref 2.4–3.5)
GLUCOSE SERPL-MCNC: 97 MG/DL (ref 82–115)
HCT VFR BLD AUTO: 33.7 % (ref 37–47)
HGB BLD-MCNC: 10.7 GM/DL (ref 12–16)
IMM GRANULOCYTES # BLD AUTO: 0 X10(3)/MCL (ref 0–0.04)
IMM GRANULOCYTES NFR BLD AUTO: 0 %
LYMPHOCYTES # BLD AUTO: 0.88 X10(3)/MCL (ref 0.6–4.6)
LYMPHOCYTES NFR BLD AUTO: 26.7 %
MCH RBC QN AUTO: 34 PG
MCHC RBC AUTO-ENTMCNC: 31.8 MG/DL (ref 33–36)
MCV RBC AUTO: 107 FL (ref 80–94)
MONOCYTES # BLD AUTO: 0.36 X10(3)/MCL (ref 0.1–1.3)
MONOCYTES NFR BLD AUTO: 10.9 %
NEUTROPHILS # BLD AUTO: 1.98 X10(3)/MCL (ref 2.1–9.2)
NEUTROPHILS NFR BLD AUTO: 60 %
PLATELET # BLD AUTO: 564 X10(3)/MCL (ref 140–371)
PMV BLD AUTO: 8.6 FL (ref 9.4–12.4)
POTASSIUM SERPL-SCNC: 4.9 MMOL/L (ref 3.5–5.1)
PROT SERPL-MCNC: 7.5 GM/DL (ref 5.8–7.6)
RBC # BLD AUTO: 3.15 X10(6)/MCL (ref 4.2–5.4)
SODIUM SERPL-SCNC: 139 MMOL/L (ref 136–145)
WBC # SPEC AUTO: 3.3 X10(3)/MCL (ref 4.5–11.5)

## 2023-01-04 PROCEDURE — 1159F MED LIST DOCD IN RCRD: CPT | Mod: CPTII,S$GLB,, | Performed by: NURSE PRACTITIONER

## 2023-01-04 PROCEDURE — 1126F PR PAIN SEVERITY QUANTIFIED, NO PAIN PRESENT: ICD-10-PCS | Mod: CPTII,S$GLB,, | Performed by: NURSE PRACTITIONER

## 2023-01-04 PROCEDURE — 99999 PR PBB SHADOW E&M-EST. PATIENT-LVL III: CPT | Mod: PBBFAC,,, | Performed by: NURSE PRACTITIONER

## 2023-01-04 PROCEDURE — 3075F PR MOST RECENT SYSTOLIC BLOOD PRESS GE 130-139MM HG: ICD-10-PCS | Mod: CPTII,S$GLB,, | Performed by: NURSE PRACTITIONER

## 2023-01-04 PROCEDURE — 3080F DIAST BP >= 90 MM HG: CPT | Mod: CPTII,S$GLB,, | Performed by: NURSE PRACTITIONER

## 2023-01-04 PROCEDURE — 3288F FALL RISK ASSESSMENT DOCD: CPT | Mod: CPTII,S$GLB,, | Performed by: NURSE PRACTITIONER

## 2023-01-04 PROCEDURE — 80053 COMPREHEN METABOLIC PANEL: CPT

## 2023-01-04 PROCEDURE — 36415 COLL VENOUS BLD VENIPUNCTURE: CPT

## 2023-01-04 PROCEDURE — 99214 PR OFFICE/OUTPT VISIT, EST, LEVL IV, 30-39 MIN: ICD-10-PCS | Mod: S$GLB,,, | Performed by: NURSE PRACTITIONER

## 2023-01-04 PROCEDURE — 1101F PR PT FALLS ASSESS DOC 0-1 FALLS W/OUT INJ PAST YR: ICD-10-PCS | Mod: CPTII,S$GLB,, | Performed by: NURSE PRACTITIONER

## 2023-01-04 PROCEDURE — 1101F PT FALLS ASSESS-DOCD LE1/YR: CPT | Mod: CPTII,S$GLB,, | Performed by: NURSE PRACTITIONER

## 2023-01-04 PROCEDURE — 85025 COMPLETE CBC W/AUTO DIFF WBC: CPT

## 2023-01-04 PROCEDURE — 3075F SYST BP GE 130 - 139MM HG: CPT | Mod: CPTII,S$GLB,, | Performed by: NURSE PRACTITIONER

## 2023-01-04 PROCEDURE — 3288F PR FALLS RISK ASSESSMENT DOCUMENTED: ICD-10-PCS | Mod: CPTII,S$GLB,, | Performed by: NURSE PRACTITIONER

## 2023-01-04 PROCEDURE — 99214 OFFICE O/P EST MOD 30 MIN: CPT | Mod: S$GLB,,, | Performed by: NURSE PRACTITIONER

## 2023-01-04 PROCEDURE — 3008F PR BODY MASS INDEX (BMI) DOCUMENTED: ICD-10-PCS | Mod: CPTII,S$GLB,, | Performed by: NURSE PRACTITIONER

## 2023-01-04 PROCEDURE — 1126F AMNT PAIN NOTED NONE PRSNT: CPT | Mod: CPTII,S$GLB,, | Performed by: NURSE PRACTITIONER

## 2023-01-04 PROCEDURE — 1160F RVW MEDS BY RX/DR IN RCRD: CPT | Mod: CPTII,S$GLB,, | Performed by: NURSE PRACTITIONER

## 2023-01-04 PROCEDURE — 1159F PR MEDICATION LIST DOCUMENTED IN MEDICAL RECORD: ICD-10-PCS | Mod: CPTII,S$GLB,, | Performed by: NURSE PRACTITIONER

## 2023-01-04 PROCEDURE — 1160F PR REVIEW ALL MEDS BY PRESCRIBER/CLIN PHARMACIST DOCUMENTED: ICD-10-PCS | Mod: CPTII,S$GLB,, | Performed by: NURSE PRACTITIONER

## 2023-01-04 PROCEDURE — 99999 PR PBB SHADOW E&M-EST. PATIENT-LVL III: ICD-10-PCS | Mod: PBBFAC,,, | Performed by: NURSE PRACTITIONER

## 2023-01-04 PROCEDURE — 3080F PR MOST RECENT DIASTOLIC BLOOD PRESSURE >= 90 MM HG: ICD-10-PCS | Mod: CPTII,S$GLB,, | Performed by: NURSE PRACTITIONER

## 2023-01-04 PROCEDURE — 3008F BODY MASS INDEX DOCD: CPT | Mod: CPTII,S$GLB,, | Performed by: NURSE PRACTITIONER

## 2023-01-04 NOTE — PROGRESS NOTES
HEMATOLOGY/ONCOLOGY OFFICE CLINIC VISIT    Visit Information:  Visit type:  On-going care, Review/discussion of tests, Scheduled follow-up.    Accompanied by:  No one.    Source of history:  Self.    Referral source:  Roc GIFFORD, Isaac HUANG    History limitation:  None.        Initial Consultation: 3/28/2022  Referring Physician: Dr Brian  Other Physicians:  Code Status: Not addressed    Diagnosis/Problem list:   Essential Thrombocytosis  --CALR mutation analysis, exon 9: Positive.    Present Treatment:  Hydrea 500 mg Daily + Agrylin 1 mg daily (2mg on Sat/Sun only)    Treatment history:    Hydrea       Imaging:      Pathology:  March 21, 2022 15:21 Peripheral blood, CALR mutation analysis, exon 9: Positive. 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 Peripheral blood, JAK2 V617F mutation analysis: Negative for JAK2 V617F.    CLINICAL HISTORY:       Patient: 72 years old female kindly referred for thrombocytosis.     Reviewing electronic on medical record it seems like her platelets were normal up to March 2018.  In March 2019 platelets were mildly elevated 496,009 slowly there were trending up with the most recent on 3/15/2022 of 1,012,000.  She is taking baby aspirin.    March 21, 2022 15:21 Peripheral blood, CALR mutation analysis, exon 9: Positive. 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  Peripheral blood, JAK2 V617F mutation analysis: Negative for JAK2 V617F.    She denies any family history of blood disorders or malignancies.        Chief Complaint: Rash on left lower leg with redness and itching    Here for ET    Interval History:  Patient presents today for 4 week follow up appointment.  She is taking Hydrea 500 mg daily and Agrylin 1 mg daily. At her last visit , an additional 1 mg of Agrylin was added on Sat/Sun only. She is tolerating with no side effects except for fatigue. Otherwise, she is doing well. She denies any fever, chills, sweats.  No chest pain or shortness  "of breath.  No bleeding.     No headaches or changes in vision. No neurologic deficit. Small patch of dry skin with erythema on her left lower leg.     ROS:  All 14 points ROS taken and as per Interval History    Histories:  PMH/PSH/FH/SOCIAL/ALLERGIES AND MEDS REVIEWED AND UPDATED AS APPROPRIATE       Vitals:    01/04/23 0918   BP: (!) 137/90   BP Location: Left arm   Patient Position: Sitting   Pulse: 73   Temp: 98 °F (36.7 °C)   TempSrc: Oral   SpO2: 98%   Weight: 81.2 kg (179 lb)   Height: 5' 6" (1.676 m)          Physical Exam  Vitals and nursing note reviewed.   Constitutional:       General: She is not in acute distress.     Appearance: Normal appearance. She is well-developed.   HENT:      Head: Normocephalic and atraumatic.      Mouth/Throat:      Mouth: Mucous membranes are moist.   Eyes:      General: No scleral icterus.     Extraocular Movements: Extraocular movements intact.      Conjunctiva/sclera: Conjunctivae normal.      Pupils: Pupils are equal, round, and reactive to light.   Neck:      Vascular: No JVD.   Cardiovascular:      Rate and Rhythm: Normal rate and regular rhythm.      Heart sounds: No murmur heard.  Pulmonary:      Effort: Pulmonary effort is normal.      Breath sounds: Normal breath sounds. No wheezing or rhonchi.   Chest:      Chest wall: No deformity or tenderness.   Breasts:     Right: No swelling, mass, nipple discharge, skin change or tenderness.      Left: No swelling, mass, nipple discharge, skin change or tenderness.   Abdominal:      General: Bowel sounds are normal. There is no distension.      Palpations: Abdomen is soft. There is no splenomegaly or mass.      Tenderness: There is no abdominal tenderness.   Musculoskeletal:         General: No swelling or deformity.      Cervical back: Neck supple.   Lymphadenopathy:      Cervical: No cervical adenopathy.      Upper Body:      Right upper body: No supraclavicular or axillary adenopathy.      Left upper body: No " supraclavicular or axillary adenopathy.      Lower Body: No right inguinal adenopathy. No left inguinal adenopathy.   Skin:     General: Skin is warm.      Coloration: Skin is not jaundiced.      Findings: No lesion or rash.      Nails: There is no clubbing.      Comments: Quarter sized area of erythema and scaling on left lower leg.    Neurological:      General: No focal deficit present.      Mental Status: She is alert and oriented to person, place, and time.      Sensory: Sensation is intact.      Motor: Motor function is intact.      Gait: Gait is intact.   Psychiatric:         Attention and Perception: Attention normal.         Mood and Affect: Mood and affect normal.         Speech: Speech normal.         Behavior: Behavior is cooperative.         Thought Content: Thought content normal.         Cognition and Memory: Cognition normal.         Judgment: Judgment normal.     ECOG SCORE             Laboratory:  CBC with Differential:  Lab Results   Component Value Date    WBC 3.3 (L) 01/04/2023    RBC 3.15 (L) 01/04/2023    HGB 10.7 (L) 01/04/2023    HCT 33.7 (L) 01/04/2023    .0 (H) 01/04/2023    MCH 34.0 01/04/2023    MCHC 31.8 (L) 01/04/2023    RDW 13.3 01/04/2023     (H) 01/04/2023    MPV 8.6 (L) 01/04/2023       Latest Reference Range & Units 05/23/22 08:40 06/09/22 07:37 06/23/22 11:27 07/07/22 07:27 07/21/22 11:18 08/04/22 08:10 08/18/22 10:52   WBC 4.5 - 11.5 x10(3)/mcL 2.6 (L) 3.3 (L) 4.0 (L) 3.1 (L) 3.4 (L) 3.5 (L) 4.1 (L)   (L): Data is abnormally low     Reference Range & Units 05/23/22 08:40 06/09/22 07:37 06/23/22 11:27 07/07/22 07:27 07/21/22 11:18 08/04/22 08:10 08/18/22 10:52   Platelets 130 - 400 x10(3)/mcL 449 (H) 557 (H) 612 (H) 623 (H) 646 (H) 673 (H) 590 (H)   (H): Data is abnormally high   Reference Range & Units 05/23/22 08:40 06/09/22 07:37 06/23/22 11:27 07/07/22 07:27 07/21/22 11:18 08/04/22 08:10 08/18/22 10:52   Hemoglobin 12.0 - 16.0 gm/dL 10.5 (L) 10.6 (L) 10.8 (L)  10.9 (L) 11.0 (L) 10.8 (L) 11.3 (L)   (L): Data is abnormally low    CMP:  Sodium Level   Date Value Ref Range Status   01/04/2023 139 136 - 145 mmol/L Final     Potassium Level   Date Value Ref Range Status   01/04/2023 4.9 3.5 - 5.1 mmol/L Final     Carbon Dioxide   Date Value Ref Range Status   01/04/2023 28 23 - 31 mmol/L Final     Blood Urea Nitrogen   Date Value Ref Range Status   01/04/2023 9.7 (L) 9.8 - 20.1 mg/dL Final     Creatinine   Date Value Ref Range Status   01/04/2023 0.69 0.55 - 1.02 mg/dL Final     Calcium Level Total   Date Value Ref Range Status   01/04/2023 9.5 8.4 - 10.2 mg/dL Final     Albumin Level   Date Value Ref Range Status   01/04/2023 4.2 3.4 - 4.8 g/dL Final     Bilirubin Total   Date Value Ref Range Status   01/04/2023 0.5 <=1.5 mg/dL Final     Alkaline Phosphatase   Date Value Ref Range Status   01/04/2023 86 40 - 150 unit/L Final     Aspartate Aminotransferase   Date Value Ref Range Status   01/04/2023 21 5 - 34 unit/L Final     Alanine Aminotransferase   Date Value Ref Range Status   01/04/2023 12 0 - 55 unit/L Final     Estimated GFR-Non    Date Value Ref Range Status   08/04/2022 >60 mls/min/1.73/m2 Final         Assessment:         1. Essential thrombocytosis          Essential thrombocytosis -- CALR mutation positive, 52 bp deletion Exon 9   --on Hydrea and Agrylin  Anemia-due to therapy  Leukopenia-due to therapy       Plan:       Thrombocytosis persists. Now on Agrylin 1 mg  daily and Hydrea 500 mg daily.  Added  Agrylin 1 mg on Sat/Sun only ( for a total of 2 mg on Sat/Sun only).     Emphasized continuation of daily baby aspirin. Will continue present management for now.             Continue Hydrea  500 mg daily and Agrylin to 1 mg daily +an additional dose of Agrylin 1 mg on Sat/Sun only.       Cont ASA 81 mg daily      Repeat CBC in 2 weeks  RTC 4 weeks with CBC, CMP same day    Encouraged to call for any questions or problems  The patient was given ample  opportunity to ask questions and they were all answered to satisfaction; patient demonstrated understanding of what we discussed and is agreeable to the plan.    GORDON Collazo

## 2023-01-17 PROBLEM — Z00.00 ENCOUNTER FOR WELLNESS EXAMINATION IN ADULT: Status: ACTIVE | Noted: 2023-01-17

## 2023-01-17 PROBLEM — D84.821 DRUG-INDUCED IMMUNODEFICIENCY: Status: ACTIVE | Noted: 2023-01-17

## 2023-01-17 PROBLEM — Z79.899 DRUG-INDUCED IMMUNODEFICIENCY: Status: ACTIVE | Noted: 2023-01-17

## 2023-01-17 PROBLEM — Z23 IMMUNIZATION DUE: Status: ACTIVE | Noted: 2023-01-17

## 2023-02-09 ENCOUNTER — OFFICE VISIT (OUTPATIENT)
Dept: HEMATOLOGY/ONCOLOGY | Facility: CLINIC | Age: 73
End: 2023-02-09
Payer: MEDICARE

## 2023-02-09 VITALS
DIASTOLIC BLOOD PRESSURE: 67 MMHG | TEMPERATURE: 98 F | HEIGHT: 66 IN | BODY MASS INDEX: 28.45 KG/M2 | OXYGEN SATURATION: 72 % | HEART RATE: 72 BPM | WEIGHT: 177 LBS | SYSTOLIC BLOOD PRESSURE: 114 MMHG

## 2023-02-09 DIAGNOSIS — Z51.81 THERAPEUTIC DRUG MONITORING: ICD-10-CM

## 2023-02-09 DIAGNOSIS — D47.3 ESSENTIAL THROMBOCYTOSIS: Primary | ICD-10-CM

## 2023-02-09 PROCEDURE — 1101F PT FALLS ASSESS-DOCD LE1/YR: CPT | Mod: CPTII,S$GLB,, | Performed by: NURSE PRACTITIONER

## 2023-02-09 PROCEDURE — 1126F AMNT PAIN NOTED NONE PRSNT: CPT | Mod: CPTII,S$GLB,, | Performed by: NURSE PRACTITIONER

## 2023-02-09 PROCEDURE — 99999 PR PBB SHADOW E&M-EST. PATIENT-LVL III: ICD-10-PCS | Mod: PBBFAC,,, | Performed by: NURSE PRACTITIONER

## 2023-02-09 PROCEDURE — 1159F MED LIST DOCD IN RCRD: CPT | Mod: CPTII,S$GLB,, | Performed by: NURSE PRACTITIONER

## 2023-02-09 PROCEDURE — 3008F PR BODY MASS INDEX (BMI) DOCUMENTED: ICD-10-PCS | Mod: CPTII,S$GLB,, | Performed by: NURSE PRACTITIONER

## 2023-02-09 PROCEDURE — 3078F PR MOST RECENT DIASTOLIC BLOOD PRESSURE < 80 MM HG: ICD-10-PCS | Mod: CPTII,S$GLB,, | Performed by: NURSE PRACTITIONER

## 2023-02-09 PROCEDURE — 4010F PR ACE/ARB THEARPY RXD/TAKEN: ICD-10-PCS | Mod: CPTII,S$GLB,, | Performed by: NURSE PRACTITIONER

## 2023-02-09 PROCEDURE — 3078F DIAST BP <80 MM HG: CPT | Mod: CPTII,S$GLB,, | Performed by: NURSE PRACTITIONER

## 2023-02-09 PROCEDURE — 3074F SYST BP LT 130 MM HG: CPT | Mod: CPTII,S$GLB,, | Performed by: NURSE PRACTITIONER

## 2023-02-09 PROCEDURE — 3074F PR MOST RECENT SYSTOLIC BLOOD PRESSURE < 130 MM HG: ICD-10-PCS | Mod: CPTII,S$GLB,, | Performed by: NURSE PRACTITIONER

## 2023-02-09 PROCEDURE — 4010F ACE/ARB THERAPY RXD/TAKEN: CPT | Mod: CPTII,S$GLB,, | Performed by: NURSE PRACTITIONER

## 2023-02-09 PROCEDURE — 1101F PR PT FALLS ASSESS DOC 0-1 FALLS W/OUT INJ PAST YR: ICD-10-PCS | Mod: CPTII,S$GLB,, | Performed by: NURSE PRACTITIONER

## 2023-02-09 PROCEDURE — 1159F PR MEDICATION LIST DOCUMENTED IN MEDICAL RECORD: ICD-10-PCS | Mod: CPTII,S$GLB,, | Performed by: NURSE PRACTITIONER

## 2023-02-09 PROCEDURE — 3288F PR FALLS RISK ASSESSMENT DOCUMENTED: ICD-10-PCS | Mod: CPTII,S$GLB,, | Performed by: NURSE PRACTITIONER

## 2023-02-09 PROCEDURE — 3008F BODY MASS INDEX DOCD: CPT | Mod: CPTII,S$GLB,, | Performed by: NURSE PRACTITIONER

## 2023-02-09 PROCEDURE — 99214 OFFICE O/P EST MOD 30 MIN: CPT | Mod: S$GLB,,, | Performed by: NURSE PRACTITIONER

## 2023-02-09 PROCEDURE — 99999 PR PBB SHADOW E&M-EST. PATIENT-LVL III: CPT | Mod: PBBFAC,,, | Performed by: NURSE PRACTITIONER

## 2023-02-09 PROCEDURE — 99214 PR OFFICE/OUTPT VISIT, EST, LEVL IV, 30-39 MIN: ICD-10-PCS | Mod: S$GLB,,, | Performed by: NURSE PRACTITIONER

## 2023-02-09 PROCEDURE — 3288F FALL RISK ASSESSMENT DOCD: CPT | Mod: CPTII,S$GLB,, | Performed by: NURSE PRACTITIONER

## 2023-02-09 PROCEDURE — 1160F RVW MEDS BY RX/DR IN RCRD: CPT | Mod: CPTII,S$GLB,, | Performed by: NURSE PRACTITIONER

## 2023-02-09 PROCEDURE — 1160F PR REVIEW ALL MEDS BY PRESCRIBER/CLIN PHARMACIST DOCUMENTED: ICD-10-PCS | Mod: CPTII,S$GLB,, | Performed by: NURSE PRACTITIONER

## 2023-02-09 PROCEDURE — 1126F PR PAIN SEVERITY QUANTIFIED, NO PAIN PRESENT: ICD-10-PCS | Mod: CPTII,S$GLB,, | Performed by: NURSE PRACTITIONER

## 2023-02-09 RX ORDER — HYDROXYUREA 500 MG/1
500 CAPSULE ORAL 2 TIMES DAILY
Qty: 60 CAPSULE | Refills: 3 | Status: SHIPPED | OUTPATIENT
Start: 2023-02-09 | End: 2023-04-10

## 2023-02-09 NOTE — PROGRESS NOTES
Answers submitted by the patient for this visit:  Review of Systems Questionnaire (Submitted on 2/6/2023)  appetite change : No  unexpected weight change: No  mouth sores: No  visual disturbance: No  cough: No  shortness of breath: No  chest pain: No  abdominal pain: No  diarrhea: No  frequency: No  back pain: No  rash: No  headaches: No  adenopathy: No  nervous/ anxious: Yes

## 2023-02-09 NOTE — PROGRESS NOTES
HEMATOLOGY/ONCOLOGY OFFICE CLINIC VISIT    Visit Information:  Visit type:  On-going care, Review/discussion of tests, Scheduled follow-up.    Accompanied by:  No one.    Source of history:  Self.    Referral source:  Roc GIFFORD, Isaac HUANG    History limitation:  None.        Initial Consultation: 3/28/2022  Referring Physician: Dr Brian  Other Physicians:  Code Status: Not addressed    Diagnosis/Problem list:   Essential Thrombocytosis  --CALR mutation analysis, exon 9: Positive.    Present Treatment:  Hydrea 500 mg Daily + Agrylin 1 mg daily (2mg on Sat/Sun only)  Change to Hydrea 500 mg BID + Agrylin 1 mg daily (2/9/23)    Treatment history:    Hydrea       Imaging:      Pathology:  March 21, 2022 15:21 Peripheral blood, CALR mutation analysis, exon 9: Positive. 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 Peripheral blood, JAK2 V617F mutation analysis: Negative for JAK2 V617F.    CLINICAL HISTORY:       Patient: 72 years old female kindly referred for thrombocytosis.     Reviewing electronic on medical record it seems like her platelets were normal up to March 2018.  In March 2019 platelets were mildly elevated 496,009 slowly there were trending up with the most recent on 3/15/2022 of 1,012,000.  She is taking baby aspirin.    March 21, 2022 15:21 Peripheral blood, CALR mutation analysis, exon 9: Positive. 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  Peripheral blood, JAK2 V617F mutation analysis: Negative for JAK2 V617F.    She denies any family history of blood disorders or malignancies.        Chief Complaint: Nausea in the am    Here for ET    Interval History:  Patient presents today for 4 week follow up appointment.  She is taking Hydrea 500 mg daily and Agrylin 1 mg daily. + additional 1 mg of Agrylin was added on Sat/Sun only. She is tolerating with no side effects except for fatigue and nausea in the mornings. Otherwise, she is doing well. She denies any fever, chills,  "sweats.  No chest pain or shortness of breath.  No bleeding.     No headaches or changes in vision. No neurologic deficit. Her platelet count is increasing, now 715k. Anemia is stable.     ROS:  All 14 points ROS taken and as per Interval History    Histories:  PMH/PSH/FH/SOCIAL/ALLERGIES AND MEDS REVIEWED AND UPDATED AS APPROPRIATE       Vitals:    02/09/23 0824   BP: 114/67   BP Location: Left arm   Patient Position: Sitting   Pulse: 72   Temp: 97.5 °F (36.4 °C)   TempSrc: Oral   SpO2: (!) 72%   Weight: 80.3 kg (177 lb)   Height: 5' 6" (1.676 m)          Physical Exam  Vitals and nursing note reviewed.   Constitutional:       General: She is not in acute distress.     Appearance: Normal appearance. She is well-developed.   HENT:      Head: Normocephalic and atraumatic.      Mouth/Throat:      Mouth: Mucous membranes are moist.   Eyes:      General: No scleral icterus.     Extraocular Movements: Extraocular movements intact.      Conjunctiva/sclera: Conjunctivae normal.      Pupils: Pupils are equal, round, and reactive to light.   Neck:      Vascular: No JVD.   Cardiovascular:      Rate and Rhythm: Normal rate and regular rhythm.      Heart sounds: No murmur heard.  Pulmonary:      Effort: Pulmonary effort is normal.      Breath sounds: Normal breath sounds. No wheezing or rhonchi.   Chest:      Chest wall: No deformity or tenderness.   Breasts:     Right: No swelling, mass, nipple discharge, skin change or tenderness.      Left: No swelling, mass, nipple discharge, skin change or tenderness.   Abdominal:      General: Bowel sounds are normal. There is no distension.      Palpations: Abdomen is soft. There is no splenomegaly or mass.      Tenderness: There is no abdominal tenderness.   Musculoskeletal:         General: No swelling or deformity.      Cervical back: Neck supple.   Lymphadenopathy:      Cervical: No cervical adenopathy.      Upper Body:      Right upper body: No supraclavicular or axillary " adenopathy.      Left upper body: No supraclavicular or axillary adenopathy.      Lower Body: No right inguinal adenopathy. No left inguinal adenopathy.   Skin:     General: Skin is warm.      Coloration: Skin is not jaundiced.      Findings: No lesion or rash.      Nails: There is no clubbing.   Neurological:      General: No focal deficit present.      Mental Status: She is alert and oriented to person, place, and time.      Sensory: Sensation is intact.      Motor: Motor function is intact.      Gait: Gait is intact.   Psychiatric:         Attention and Perception: Attention normal.         Mood and Affect: Mood and affect normal.         Speech: Speech normal.         Behavior: Behavior is cooperative.         Thought Content: Thought content normal.         Cognition and Memory: Cognition normal.         Judgment: Judgment normal.     ECOG SCORE    0 - Fully active-able to carry on all pre-disease performance without restriction         Laboratory:  CBC with Differential:  Lab Results   Component Value Date    WBC 4.3 (L) 02/09/2023    RBC 3.18 (L) 02/09/2023    HGB 10.8 (L) 02/09/2023    HCT 33.9 (L) 02/09/2023    .6 (H) 02/09/2023    MCH 34.0 02/09/2023    MCHC 31.9 (L) 02/09/2023    RDW 13.5 02/09/2023     (H) 02/09/2023    MPV 8.8 02/09/2023       Latest Reference Range & Units 05/23/22 08:40 06/09/22 07:37 06/23/22 11:27 07/07/22 07:27 07/21/22 11:18 08/04/22 08:10 08/18/22 10:52   WBC 4.5 - 11.5 x10(3)/mcL 2.6 (L) 3.3 (L) 4.0 (L) 3.1 (L) 3.4 (L) 3.5 (L) 4.1 (L)   (L): Data is abnormally low     Reference Range & Units 05/23/22 08:40 06/09/22 07:37 06/23/22 11:27 07/07/22 07:27 07/21/22 11:18 08/04/22 08:10 08/18/22 10:52   Platelets 130 - 400 x10(3)/mcL 449 (H) 557 (H) 612 (H) 623 (H) 646 (H) 673 (H) 590 (H)   (H): Data is abnormally high   Reference Range & Units 05/23/22 08:40 06/09/22 07:37 06/23/22 11:27 07/07/22 07:27 07/21/22 11:18 08/04/22 08:10 08/18/22 10:52   Hemoglobin 12.0 - 16.0  gm/dL 10.5 (L) 10.6 (L) 10.8 (L) 10.9 (L) 11.0 (L) 10.8 (L) 11.3 (L)   (L): Data is abnormally low    CMP:  Sodium Level   Date Value Ref Range Status   01/18/2023 141 136 - 145 mmol/L Final     Potassium Level   Date Value Ref Range Status   01/18/2023 4.7 3.5 - 5.1 mmol/L Final     Carbon Dioxide   Date Value Ref Range Status   01/18/2023 25 21 - 32 mmol/L Final     Blood Urea Nitrogen   Date Value Ref Range Status   01/18/2023 19.0 (H) 7.0 - 18.0 mg/dL Final     Creatinine   Date Value Ref Range Status   01/18/2023 0.58 (L) 0.60 - 1.30 mg/dL Final     Calcium Level Total   Date Value Ref Range Status   01/18/2023 9.6 8.5 - 10.1 mg/dL Final     Albumin Level   Date Value Ref Range Status   01/18/2023 4.2 3.4 - 5.0 g/dL Final     Bilirubin Total   Date Value Ref Range Status   01/18/2023 0.5 0.2 - 1.0 mg/dL Final     Alkaline Phosphatase   Date Value Ref Range Status   01/18/2023 63 38 - 126 unit/L Final     Aspartate Aminotransferase   Date Value Ref Range Status   01/18/2023 18 15 - 37 unit/L Final     Alanine Aminotransferase   Date Value Ref Range Status   01/18/2023 9 7 - 52 unit/L Final     Estimated GFR-Non    Date Value Ref Range Status   08/04/2022 >60 mls/min/1.73/m2 Final         Assessment:         1. Essential thrombocytosis    2. Therapeutic drug monitoring            Essential thrombocytosis -- CALR mutation positive, 52 bp deletion Exon 9   --on Hydrea and Agrylin  Anemia-due to therapy  Leukopenia-due to therapy       Plan:       Thrombocytosis persists and increasing.  Now on Agrylin 1 mg  daily + additional Agrylin 1 mg on Sat/Sun only and Hydrea 500 mg daily.        Will increase Hydrea to 500 mg bid and continue Agrylin 1 mg daily (but d/c second Agrylin dose on Sat/Sun- this addition did not help at all)           Cont ASA 81 mg daily      Repeat CBC in 2 weeks  RTC 4 weeks with CBC, CMP same day    Encouraged to call for any questions or problems  The patient was given ample  opportunity to ask questions and they were all answered to satisfaction; patient demonstrated understanding of what we discussed and is agreeable to the plan.    GORDON Collazo

## 2023-02-22 ENCOUNTER — LAB VISIT (OUTPATIENT)
Dept: LAB | Facility: HOSPITAL | Age: 73
End: 2023-02-22
Attending: INTERNAL MEDICINE
Payer: MEDICARE

## 2023-02-22 DIAGNOSIS — D47.3 ESSENTIAL THROMBOCYTOSIS: ICD-10-CM

## 2023-02-22 LAB
BASOPHILS # BLD AUTO: 0.06 X10(3)/MCL (ref 0–0.2)
BASOPHILS NFR BLD AUTO: 1.3 %
EOSINOPHIL # BLD AUTO: 0.07 X10(3)/MCL (ref 0–0.9)
EOSINOPHIL NFR BLD AUTO: 1.6 %
ERYTHROCYTE [DISTWIDTH] IN BLOOD BY AUTOMATED COUNT: 13.8 % (ref 11.5–17)
HCT VFR BLD AUTO: 33.9 % (ref 37–47)
HGB BLD-MCNC: 11 G/DL (ref 12–16)
IMM GRANULOCYTES # BLD AUTO: 0.01 X10(3)/MCL (ref 0–0.04)
IMM GRANULOCYTES NFR BLD AUTO: 0.2 %
LYMPHOCYTES # BLD AUTO: 0.85 X10(3)/MCL (ref 0.6–4.6)
LYMPHOCYTES NFR BLD AUTO: 19.1 %
MCH RBC QN AUTO: 34.5 PG
MCHC RBC AUTO-ENTMCNC: 32.4 G/DL (ref 33–36)
MCV RBC AUTO: 106.3 FL (ref 80–94)
MONOCYTES # BLD AUTO: 0.3 X10(3)/MCL (ref 0.1–1.3)
MONOCYTES NFR BLD AUTO: 6.7 %
NEUTROPHILS # BLD AUTO: 3.17 X10(3)/MCL (ref 2.1–9.2)
NEUTROPHILS NFR BLD AUTO: 71.1 %
PLATELET # BLD AUTO: 722 X10(3)/MCL (ref 130–400)
PMV BLD AUTO: 9.3 FL (ref 7.4–10.4)
RBC # BLD AUTO: 3.19 X10(6)/MCL (ref 4.2–5.4)
WBC # SPEC AUTO: 4.5 X10(3)/MCL (ref 4.5–11.5)

## 2023-02-22 PROCEDURE — 85025 COMPLETE CBC W/AUTO DIFF WBC: CPT

## 2023-02-22 PROCEDURE — 36415 COLL VENOUS BLD VENIPUNCTURE: CPT

## 2023-04-03 ENCOUNTER — LAB VISIT (OUTPATIENT)
Dept: LAB | Facility: HOSPITAL | Age: 73
End: 2023-04-03
Attending: FAMILY MEDICINE
Payer: MEDICARE

## 2023-04-03 ENCOUNTER — OFFICE VISIT (OUTPATIENT)
Dept: HEMATOLOGY/ONCOLOGY | Facility: CLINIC | Age: 73
End: 2023-04-03
Payer: MEDICARE

## 2023-04-03 VITALS
HEIGHT: 66 IN | DIASTOLIC BLOOD PRESSURE: 84 MMHG | WEIGHT: 178 LBS | HEART RATE: 71 BPM | OXYGEN SATURATION: 100 % | SYSTOLIC BLOOD PRESSURE: 143 MMHG | BODY MASS INDEX: 28.61 KG/M2 | TEMPERATURE: 98 F

## 2023-04-03 DIAGNOSIS — D47.3 ESSENTIAL THROMBOCYTOSIS: Primary | ICD-10-CM

## 2023-04-03 DIAGNOSIS — Z51.81 THERAPEUTIC DRUG MONITORING: ICD-10-CM

## 2023-04-03 DIAGNOSIS — D47.3 ESSENTIAL THROMBOCYTOSIS: ICD-10-CM

## 2023-04-03 LAB
BASOPHILS # BLD AUTO: 0.06 X10(3)/MCL (ref 0–0.2)
BASOPHILS NFR BLD AUTO: 1.6 %
EOSINOPHIL # BLD AUTO: 0.05 X10(3)/MCL (ref 0–0.9)
EOSINOPHIL NFR BLD AUTO: 1.3 %
ERYTHROCYTE [DISTWIDTH] IN BLOOD BY AUTOMATED COUNT: 15.9 % (ref 11.5–17)
HCT VFR BLD AUTO: 33.3 % (ref 37–47)
HGB BLD-MCNC: 10.6 G/DL (ref 12–16)
IMM GRANULOCYTES # BLD AUTO: 0.01 X10(3)/MCL (ref 0–0.04)
IMM GRANULOCYTES NFR BLD AUTO: 0.3 %
LYMPHOCYTES # BLD AUTO: 0.76 X10(3)/MCL (ref 0.6–4.6)
LYMPHOCYTES NFR BLD AUTO: 20.4 %
MCH RBC QN AUTO: 35.5 PG (ref 27–31)
MCHC RBC AUTO-ENTMCNC: 31.8 G/DL (ref 33–36)
MCV RBC AUTO: 111.4 FL (ref 80–94)
MONOCYTES # BLD AUTO: 0.32 X10(3)/MCL (ref 0.1–1.3)
MONOCYTES NFR BLD AUTO: 8.6 %
NEUTROPHILS # BLD AUTO: 2.52 X10(3)/MCL (ref 2.1–9.2)
NEUTROPHILS NFR BLD AUTO: 67.8 %
PLATELET # BLD AUTO: 590 X10(3)/MCL (ref 130–400)
PMV BLD AUTO: 8.6 FL (ref 7.4–10.4)
RBC # BLD AUTO: 2.99 X10(6)/MCL (ref 4.2–5.4)
WBC # SPEC AUTO: 3.7 X10(3)/MCL (ref 4.5–11.5)

## 2023-04-03 PROCEDURE — 3288F FALL RISK ASSESSMENT DOCD: CPT | Mod: CPTII,S$GLB,, | Performed by: NURSE PRACTITIONER

## 2023-04-03 PROCEDURE — 85025 COMPLETE CBC W/AUTO DIFF WBC: CPT

## 2023-04-03 PROCEDURE — 3079F DIAST BP 80-89 MM HG: CPT | Mod: CPTII,S$GLB,, | Performed by: NURSE PRACTITIONER

## 2023-04-03 PROCEDURE — 3008F PR BODY MASS INDEX (BMI) DOCUMENTED: ICD-10-PCS | Mod: CPTII,S$GLB,, | Performed by: NURSE PRACTITIONER

## 2023-04-03 PROCEDURE — 1159F PR MEDICATION LIST DOCUMENTED IN MEDICAL RECORD: ICD-10-PCS | Mod: CPTII,S$GLB,, | Performed by: NURSE PRACTITIONER

## 2023-04-03 PROCEDURE — 36415 COLL VENOUS BLD VENIPUNCTURE: CPT

## 2023-04-03 PROCEDURE — 1126F AMNT PAIN NOTED NONE PRSNT: CPT | Mod: CPTII,S$GLB,, | Performed by: NURSE PRACTITIONER

## 2023-04-03 PROCEDURE — 1101F PT FALLS ASSESS-DOCD LE1/YR: CPT | Mod: CPTII,S$GLB,, | Performed by: NURSE PRACTITIONER

## 2023-04-03 PROCEDURE — 1101F PR PT FALLS ASSESS DOC 0-1 FALLS W/OUT INJ PAST YR: ICD-10-PCS | Mod: CPTII,S$GLB,, | Performed by: NURSE PRACTITIONER

## 2023-04-03 PROCEDURE — 4010F ACE/ARB THERAPY RXD/TAKEN: CPT | Mod: CPTII,S$GLB,, | Performed by: NURSE PRACTITIONER

## 2023-04-03 PROCEDURE — 3008F BODY MASS INDEX DOCD: CPT | Mod: CPTII,S$GLB,, | Performed by: NURSE PRACTITIONER

## 2023-04-03 PROCEDURE — 99999 PR PBB SHADOW E&M-EST. PATIENT-LVL III: ICD-10-PCS | Mod: PBBFAC,,, | Performed by: NURSE PRACTITIONER

## 2023-04-03 PROCEDURE — 3077F PR MOST RECENT SYSTOLIC BLOOD PRESSURE >= 140 MM HG: ICD-10-PCS | Mod: CPTII,S$GLB,, | Performed by: NURSE PRACTITIONER

## 2023-04-03 PROCEDURE — 1126F PR PAIN SEVERITY QUANTIFIED, NO PAIN PRESENT: ICD-10-PCS | Mod: CPTII,S$GLB,, | Performed by: NURSE PRACTITIONER

## 2023-04-03 PROCEDURE — 4010F PR ACE/ARB THEARPY RXD/TAKEN: ICD-10-PCS | Mod: CPTII,S$GLB,, | Performed by: NURSE PRACTITIONER

## 2023-04-03 PROCEDURE — 99214 OFFICE O/P EST MOD 30 MIN: CPT | Mod: S$GLB,,, | Performed by: NURSE PRACTITIONER

## 2023-04-03 PROCEDURE — 3079F PR MOST RECENT DIASTOLIC BLOOD PRESSURE 80-89 MM HG: ICD-10-PCS | Mod: CPTII,S$GLB,, | Performed by: NURSE PRACTITIONER

## 2023-04-03 PROCEDURE — 3077F SYST BP >= 140 MM HG: CPT | Mod: CPTII,S$GLB,, | Performed by: NURSE PRACTITIONER

## 2023-04-03 PROCEDURE — 3288F PR FALLS RISK ASSESSMENT DOCUMENTED: ICD-10-PCS | Mod: CPTII,S$GLB,, | Performed by: NURSE PRACTITIONER

## 2023-04-03 PROCEDURE — 99214 PR OFFICE/OUTPT VISIT, EST, LEVL IV, 30-39 MIN: ICD-10-PCS | Mod: S$GLB,,, | Performed by: NURSE PRACTITIONER

## 2023-04-03 PROCEDURE — 1159F MED LIST DOCD IN RCRD: CPT | Mod: CPTII,S$GLB,, | Performed by: NURSE PRACTITIONER

## 2023-04-03 PROCEDURE — 99999 PR PBB SHADOW E&M-EST. PATIENT-LVL III: CPT | Mod: PBBFAC,,, | Performed by: NURSE PRACTITIONER

## 2023-04-03 NOTE — PROGRESS NOTES
HEMATOLOGY/ONCOLOGY OFFICE CLINIC VISIT    Visit Information:  Visit type:  On-going care, Review/discussion of tests, Scheduled follow-up.    Accompanied by:  No one.    Source of history:  Self.    Referral source:  Roc GIFFORD, Isaac HUANG    History limitation:  None.        Initial Consultation: 3/28/2022  Referring Physician: Dr Brian  Other Physicians:  Code Status: Not addressed    Diagnosis/Problem list:   Essential Thrombocytosis  --CALR mutation analysis, exon 9: Positive.    Present Treatment:  Hydrea 500 mg Daily + Agrylin 1 mg daily (2mg on Sat/Sun only)  Change to Hydrea 500 mg BID + Agrylin 1 mg daily (2/9/23)    Treatment history:    Hydrea       Imaging:      Pathology:  March 21, 2022 15:21 Peripheral blood, CALR mutation analysis, exon 9: Positive. 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 Peripheral blood, JAK2 V617F mutation analysis: Negative for JAK2 V617F.    CLINICAL HISTORY:       Patient: 72 years old female kindly referred for thrombocytosis.     Reviewing electronic on medical record it seems like her platelets were normal up to March 2018.  In March 2019 platelets were mildly elevated 496,009 slowly there were trending up with the most recent on 3/15/2022 of 1,012,000.  She is taking baby aspirin.    March 21, 2022 15:21 Peripheral blood, CALR mutation analysis, exon 9: Positive. 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  Peripheral blood, JAK2 V617F mutation analysis: Negative for JAK2 V617F.    She denies any family history of blood disorders or malignancies.        Chief Complaint: Itching on bilateral arms    Here for ET    Interval History:  Patient presents today for 4 week follow up appointment.  She is taking Hydrea 500 mg BID and Agrylin 1 mg daily since 2/9/23. Platelet count decreased to 590 ( from 722k). WBC and H/H decreased but stable. She is tolerating with no side effects except for itching. No rash. Only dry skin noted.  Otherwise, she  "is doing well. Reports increased energy level since changing to Hydrea 500 mg BID and Agrylin 1 mg daily. She denies any fever, chills, sweats.  No chest pain or shortness of breath.  No bleeding.     No headaches or changes in vision. No neurologic deficit.     ROS:  All 14 points ROS taken and as per Interval History    Histories:  PMH/PSH/FH/SOCIAL/ALLERGIES AND MEDS REVIEWED AND UPDATED AS APPROPRIATE       Vitals:    04/03/23 0836   BP: (!) 143/84   BP Location: Left arm   Patient Position: Sitting   Pulse: 71   Temp: 97.6 °F (36.4 °C)   TempSrc: Oral   SpO2: 100%   Weight: 80.7 kg (178 lb)   Height: 5' 6" (1.676 m)            Physical Exam  Vitals and nursing note reviewed.   Constitutional:       General: She is not in acute distress.     Appearance: Normal appearance. She is well-developed.   HENT:      Head: Normocephalic and atraumatic.      Mouth/Throat:      Mouth: Mucous membranes are moist.   Eyes:      General: No scleral icterus.     Extraocular Movements: Extraocular movements intact.      Conjunctiva/sclera: Conjunctivae normal.      Pupils: Pupils are equal, round, and reactive to light.   Neck:      Vascular: No JVD.   Cardiovascular:      Rate and Rhythm: Normal rate and regular rhythm.      Heart sounds: No murmur heard.  Pulmonary:      Effort: Pulmonary effort is normal.      Breath sounds: Normal breath sounds. No wheezing or rhonchi.   Chest:      Chest wall: No deformity or tenderness.   Breasts:     Right: No swelling, mass, nipple discharge, skin change or tenderness.      Left: No swelling, mass, nipple discharge, skin change or tenderness.   Abdominal:      General: Bowel sounds are normal. There is no distension.      Palpations: Abdomen is soft. There is no splenomegaly or mass.      Tenderness: There is no abdominal tenderness.   Musculoskeletal:         General: No swelling or deformity.      Cervical back: Neck supple.   Lymphadenopathy:      Cervical: No cervical adenopathy.      " Upper Body:      Right upper body: No supraclavicular or axillary adenopathy.      Left upper body: No supraclavicular or axillary adenopathy.      Lower Body: No right inguinal adenopathy. No left inguinal adenopathy.   Skin:     General: Skin is warm.      Coloration: Skin is not jaundiced.      Findings: No lesion or rash.      Nails: There is no clubbing.   Neurological:      General: No focal deficit present.      Mental Status: She is alert and oriented to person, place, and time.      Sensory: Sensation is intact.      Motor: Motor function is intact.      Gait: Gait is intact.   Psychiatric:         Attention and Perception: Attention normal.         Mood and Affect: Mood and affect normal.         Speech: Speech normal.         Behavior: Behavior is cooperative.         Thought Content: Thought content normal.         Cognition and Memory: Cognition normal.         Judgment: Judgment normal.     ECOG SCORE    0 - Fully active-able to carry on all pre-disease performance without restriction         Laboratory:  CBC with Differential:  Lab Results   Component Value Date    WBC 3.7 (L) 04/03/2023    RBC 2.99 (L) 04/03/2023    HGB 10.6 (L) 04/03/2023    HCT 33.3 (L) 04/03/2023    .4 (H) 04/03/2023    MCH 35.5 (H) 04/03/2023    MCHC 31.8 (L) 04/03/2023    RDW 15.9 04/03/2023     (H) 04/03/2023    MPV 8.6 04/03/2023       Latest Reference Range & Units 05/23/22 08:40 06/09/22 07:37 06/23/22 11:27 07/07/22 07:27 07/21/22 11:18 08/04/22 08:10 08/18/22 10:52   WBC 4.5 - 11.5 x10(3)/mcL 2.6 (L) 3.3 (L) 4.0 (L) 3.1 (L) 3.4 (L) 3.5 (L) 4.1 (L)   (L): Data is abnormally low     Reference Range & Units 05/23/22 08:40 06/09/22 07:37 06/23/22 11:27 07/07/22 07:27 07/21/22 11:18 08/04/22 08:10 08/18/22 10:52   Platelets 130 - 400 x10(3)/mcL 449 (H) 557 (H) 612 (H) 623 (H) 646 (H) 673 (H) 590 (H)   (H): Data is abnormally high   Reference Range & Units 05/23/22 08:40 06/09/22 07:37 06/23/22 11:27 07/07/22 07:27  07/21/22 11:18 08/04/22 08:10 08/18/22 10:52   Hemoglobin 12.0 - 16.0 gm/dL 10.5 (L) 10.6 (L) 10.8 (L) 10.9 (L) 11.0 (L) 10.8 (L) 11.3 (L)   (L): Data is abnormally low    CMP:  Sodium Level   Date Value Ref Range Status   02/09/2023 140 136 - 145 mmol/L Final     Potassium Level   Date Value Ref Range Status   02/09/2023 5.7 (H) 3.5 - 5.1 mmol/L Final     Carbon Dioxide   Date Value Ref Range Status   02/09/2023 25 23 - 31 mmol/L Final     Blood Urea Nitrogen   Date Value Ref Range Status   02/09/2023 19.2 9.8 - 20.1 mg/dL Final     Creatinine   Date Value Ref Range Status   02/09/2023 0.69 0.55 - 1.02 mg/dL Final     Calcium Level Total   Date Value Ref Range Status   02/09/2023 9.9 8.4 - 10.2 mg/dL Final     Albumin Level   Date Value Ref Range Status   02/09/2023 4.1 3.4 - 4.8 g/dL Final     Bilirubin Total   Date Value Ref Range Status   02/09/2023 0.5 <=1.5 mg/dL Final     Alkaline Phosphatase   Date Value Ref Range Status   02/09/2023 81 40 - 150 unit/L Final     Aspartate Aminotransferase   Date Value Ref Range Status   02/09/2023 21 5 - 34 unit/L Final     Alanine Aminotransferase   Date Value Ref Range Status   02/09/2023 12 0 - 55 unit/L Final     Estimated GFR-Non    Date Value Ref Range Status   08/04/2022 >60 mls/min/1.73/m2 Final         Assessment:         1. Essential thrombocytosis    2. Therapeutic drug monitoring              Essential thrombocytosis -- CALR mutation positive, 52 bp deletion Exon 9   --on Hydrea and Agrylin  Anemia-due to therapy  Leukopenia-due to therapy       Plan:       Thrombocytosis persists but improving.        Continue Hydrea to 500 mg bid and continue Agrylin 1 mg daily.        Cont ASA 81 mg daily     RTC 4 weeks with CBC, CMP same day    Encouraged to call for any questions or problems  The patient was given ample opportunity to ask questions and they were all answered to satisfaction; patient demonstrated understanding of what we discussed and is  agreeable to the plan.    AAKASH CollazoP

## 2023-04-10 DIAGNOSIS — D47.3 ESSENTIAL THROMBOCYTOSIS: ICD-10-CM

## 2023-04-10 RX ORDER — HYDROXYUREA 500 MG/1
500 CAPSULE ORAL 2 TIMES DAILY
Qty: 60 CAPSULE | Refills: 3 | Status: CANCELLED | OUTPATIENT
Start: 2023-04-10 | End: 2023-08-08

## 2023-04-24 PROBLEM — Z00.00 ENCOUNTER FOR WELLNESS EXAMINATION IN ADULT: Status: RESOLVED | Noted: 2023-01-17 | Resolved: 2023-04-24

## 2023-05-09 ENCOUNTER — TELEPHONE (OUTPATIENT)
Dept: HEMATOLOGY/ONCOLOGY | Facility: CLINIC | Age: 73
End: 2023-05-09
Payer: MEDICARE

## 2023-05-09 ENCOUNTER — LAB VISIT (OUTPATIENT)
Dept: LAB | Facility: HOSPITAL | Age: 73
End: 2023-05-09
Attending: NURSE PRACTITIONER
Payer: MEDICARE

## 2023-05-09 DIAGNOSIS — D47.3 ESSENTIAL THROMBOCYTOSIS: ICD-10-CM

## 2023-05-09 DIAGNOSIS — Z51.81 THERAPEUTIC DRUG MONITORING: ICD-10-CM

## 2023-05-09 DIAGNOSIS — D47.3 ESSENTIAL THROMBOCYTOSIS: Primary | ICD-10-CM

## 2023-05-09 LAB
ALBUMIN SERPL-MCNC: 3.9 G/DL (ref 3.4–4.8)
ALBUMIN/GLOB SERPL: 1.4 RATIO (ref 1.1–2)
ALP SERPL-CCNC: 67 UNIT/L (ref 40–150)
ALT SERPL-CCNC: 10 UNIT/L (ref 0–55)
AST SERPL-CCNC: 17 UNIT/L (ref 5–34)
BASOPHILS # BLD AUTO: 0.05 X10(3)/MCL
BASOPHILS NFR BLD AUTO: 1.6 %
BILIRUBIN DIRECT+TOT PNL SERPL-MCNC: 0.4 MG/DL
BUN SERPL-MCNC: 19.4 MG/DL (ref 9.8–20.1)
CALCIUM SERPL-MCNC: 9.5 MG/DL (ref 8.4–10.2)
CHLORIDE SERPL-SCNC: 110 MMOL/L (ref 98–107)
CO2 SERPL-SCNC: 25 MMOL/L (ref 23–31)
CREAT SERPL-MCNC: 0.73 MG/DL (ref 0.55–1.02)
EOSINOPHIL # BLD AUTO: 0.02 X10(3)/MCL (ref 0–0.9)
EOSINOPHIL NFR BLD AUTO: 0.7 %
ERYTHROCYTE [DISTWIDTH] IN BLOOD BY AUTOMATED COUNT: 16.4 % (ref 11.5–17)
GFR SERPLBLD CREATININE-BSD FMLA CKD-EPI: >60 MLS/MIN/1.73/M2
GLOBULIN SER-MCNC: 2.8 GM/DL (ref 2.4–3.5)
GLUCOSE SERPL-MCNC: 89 MG/DL (ref 82–115)
HCT VFR BLD AUTO: 29.8 % (ref 37–47)
HGB BLD-MCNC: 9.6 G/DL (ref 12–16)
IMM GRANULOCYTES # BLD AUTO: 0 X10(3)/MCL (ref 0–0.04)
IMM GRANULOCYTES NFR BLD AUTO: 0 %
LYMPHOCYTES # BLD AUTO: 0.8 X10(3)/MCL (ref 0.6–4.6)
LYMPHOCYTES NFR BLD AUTO: 26.3 %
MCH RBC QN AUTO: 37.2 PG (ref 27–31)
MCHC RBC AUTO-ENTMCNC: 32.2 G/DL (ref 33–36)
MCV RBC AUTO: 115.5 FL (ref 80–94)
MONOCYTES # BLD AUTO: 0.31 X10(3)/MCL (ref 0.1–1.3)
MONOCYTES NFR BLD AUTO: 10.2 %
NEUTROPHILS # BLD AUTO: 1.86 X10(3)/MCL (ref 2.1–9.2)
NEUTROPHILS NFR BLD AUTO: 61.2 %
PLATELET # BLD AUTO: 508 X10(3)/MCL (ref 130–400)
PMV BLD AUTO: 9.3 FL (ref 7.4–10.4)
POTASSIUM SERPL-SCNC: 5.3 MMOL/L (ref 3.5–5.1)
PROT SERPL-MCNC: 6.7 GM/DL (ref 5.8–7.6)
RBC # BLD AUTO: 2.58 X10(6)/MCL (ref 4.2–5.4)
SODIUM SERPL-SCNC: 142 MMOL/L (ref 136–145)
WBC # SPEC AUTO: 3.04 X10(3)/MCL (ref 4.5–11.5)

## 2023-05-09 PROCEDURE — 36415 COLL VENOUS BLD VENIPUNCTURE: CPT

## 2023-05-09 PROCEDURE — 85025 COMPLETE CBC W/AUTO DIFF WBC: CPT

## 2023-05-09 PROCEDURE — 80053 COMPREHEN METABOLIC PANEL: CPT

## 2023-05-09 NOTE — TELEPHONE ENCOUNTER
Attempted to contact pt to advise of instructions per NP, no answer, left detailed message on vm with instructions. Pt instructed to call office back with nay questions or concerns.

## 2023-05-09 NOTE — TELEPHONE ENCOUNTER
----- Message from ZAHRAA Roche sent at 5/9/2023 11:15 AM CDT -----  Let patient know that her labs are stable. Will defer her c/o eye bruising to her PCP

## 2023-05-09 NOTE — TELEPHONE ENCOUNTER
Patient c/o bruising to left eyelid. She noticed it yesterday morning. Denies any injury. No other signs of abnormal bleeding or bruising. She is currently taking hydrea bid, agrylin once daily and ASA 81mg. Any recommendations?

## 2023-05-20 DIAGNOSIS — D47.3 ESSENTIAL THROMBOCYTOSIS: ICD-10-CM

## 2023-05-22 RX ORDER — ANAGRELIDE 1 MG/1
1 CAPSULE ORAL DAILY
Qty: 30 CAPSULE | Refills: 2 | Status: SHIPPED | OUTPATIENT
Start: 2023-05-22 | End: 2023-07-28

## 2023-05-29 DIAGNOSIS — D47.3 ESSENTIAL THROMBOCYTOSIS: Primary | ICD-10-CM

## 2023-05-30 ENCOUNTER — LAB VISIT (OUTPATIENT)
Dept: LAB | Facility: HOSPITAL | Age: 73
End: 2023-05-30
Attending: INTERNAL MEDICINE
Payer: MEDICARE

## 2023-05-30 ENCOUNTER — OFFICE VISIT (OUTPATIENT)
Dept: HEMATOLOGY/ONCOLOGY | Facility: CLINIC | Age: 73
End: 2023-05-30
Payer: MEDICARE

## 2023-05-30 VITALS
HEIGHT: 66 IN | DIASTOLIC BLOOD PRESSURE: 83 MMHG | RESPIRATION RATE: 18 BRPM | HEART RATE: 63 BPM | OXYGEN SATURATION: 97 % | SYSTOLIC BLOOD PRESSURE: 149 MMHG | TEMPERATURE: 98 F | BODY MASS INDEX: 27.91 KG/M2 | WEIGHT: 173.69 LBS

## 2023-05-30 DIAGNOSIS — Z51.81 THERAPEUTIC DRUG MONITORING: ICD-10-CM

## 2023-05-30 DIAGNOSIS — D64.9 ANEMIA, UNSPECIFIED TYPE: ICD-10-CM

## 2023-05-30 DIAGNOSIS — D47.3 ESSENTIAL THROMBOCYTOSIS: Primary | ICD-10-CM

## 2023-05-30 DIAGNOSIS — R30.0 BURNING WITH URINATION: ICD-10-CM

## 2023-05-30 DIAGNOSIS — D47.3 ESSENTIAL THROMBOCYTOSIS: ICD-10-CM

## 2023-05-30 LAB
ALBUMIN SERPL-MCNC: 4.1 G/DL (ref 3.4–4.8)
ALBUMIN/GLOB SERPL: 1.6 RATIO (ref 1.1–2)
ALP SERPL-CCNC: 72 UNIT/L (ref 40–150)
ALT SERPL-CCNC: 10 UNIT/L (ref 0–55)
AST SERPL-CCNC: 17 UNIT/L (ref 5–34)
BASOPHILS # BLD AUTO: 0.04 X10(3)/MCL
BASOPHILS NFR BLD AUTO: 1.2 %
BILIRUBIN DIRECT+TOT PNL SERPL-MCNC: 0.5 MG/DL
BUN SERPL-MCNC: 23.3 MG/DL (ref 9.8–20.1)
CALCIUM SERPL-MCNC: 9.2 MG/DL (ref 8.4–10.2)
CHLORIDE SERPL-SCNC: 109 MMOL/L (ref 98–107)
CO2 SERPL-SCNC: 27 MMOL/L (ref 23–31)
CREAT SERPL-MCNC: 0.69 MG/DL (ref 0.55–1.02)
EOSINOPHIL # BLD AUTO: 0.03 X10(3)/MCL (ref 0–0.9)
EOSINOPHIL NFR BLD AUTO: 0.9 %
ERYTHROCYTE [DISTWIDTH] IN BLOOD BY AUTOMATED COUNT: 14.5 % (ref 11.5–17)
FERRITIN SERPL-MCNC: 101.62 NG/ML (ref 4.63–204)
GFR SERPLBLD CREATININE-BSD FMLA CKD-EPI: >60 MLS/MIN/1.73/M2
GLOBULIN SER-MCNC: 2.6 GM/DL (ref 2.4–3.5)
GLUCOSE SERPL-MCNC: 94 MG/DL (ref 82–115)
HCT VFR BLD AUTO: 31.3 % (ref 37–47)
HGB BLD-MCNC: 10.1 G/DL (ref 12–16)
IMM GRANULOCYTES # BLD AUTO: 0 X10(3)/MCL (ref 0–0.04)
IMM GRANULOCYTES NFR BLD AUTO: 0 %
IRON SATN MFR SERPL: 44 % (ref 20–50)
IRON SERPL-MCNC: 107 UG/DL (ref 50–170)
LYMPHOCYTES # BLD AUTO: 0.79 X10(3)/MCL (ref 0.6–4.6)
LYMPHOCYTES NFR BLD AUTO: 23.7 %
MCH RBC QN AUTO: 38.4 PG (ref 27–31)
MCHC RBC AUTO-ENTMCNC: 32.3 G/DL (ref 33–36)
MCV RBC AUTO: 119 FL (ref 80–94)
MONOCYTES # BLD AUTO: 0.29 X10(3)/MCL (ref 0.1–1.3)
MONOCYTES NFR BLD AUTO: 8.7 %
NEUTROPHILS # BLD AUTO: 2.19 X10(3)/MCL (ref 2.1–9.2)
NEUTROPHILS NFR BLD AUTO: 65.5 %
PLATELET # BLD AUTO: 502 X10(3)/MCL (ref 130–400)
PMV BLD AUTO: 8.9 FL (ref 7.4–10.4)
POTASSIUM SERPL-SCNC: 5.1 MMOL/L (ref 3.5–5.1)
PROT SERPL-MCNC: 6.7 GM/DL (ref 5.8–7.6)
RBC # BLD AUTO: 2.63 X10(6)/MCL (ref 4.2–5.4)
SODIUM SERPL-SCNC: 140 MMOL/L (ref 136–145)
TIBC SERPL-MCNC: 135 UG/DL (ref 70–310)
TIBC SERPL-MCNC: 242 UG/DL (ref 250–450)
TRANSFERRIN SERPL-MCNC: 208 MG/DL (ref 173–360)
WBC # SPEC AUTO: 3.34 X10(3)/MCL (ref 4.5–11.5)

## 2023-05-30 PROCEDURE — 1101F PR PT FALLS ASSESS DOC 0-1 FALLS W/OUT INJ PAST YR: ICD-10-PCS | Mod: CPTII,S$GLB,, | Performed by: NURSE PRACTITIONER

## 2023-05-30 PROCEDURE — 82728 ASSAY OF FERRITIN: CPT

## 2023-05-30 PROCEDURE — 1160F PR REVIEW ALL MEDS BY PRESCRIBER/CLIN PHARMACIST DOCUMENTED: ICD-10-PCS | Mod: CPTII,S$GLB,, | Performed by: NURSE PRACTITIONER

## 2023-05-30 PROCEDURE — 83550 IRON BINDING TEST: CPT

## 2023-05-30 PROCEDURE — 1126F PR PAIN SEVERITY QUANTIFIED, NO PAIN PRESENT: ICD-10-PCS | Mod: CPTII,S$GLB,, | Performed by: NURSE PRACTITIONER

## 2023-05-30 PROCEDURE — 1126F AMNT PAIN NOTED NONE PRSNT: CPT | Mod: CPTII,S$GLB,, | Performed by: NURSE PRACTITIONER

## 2023-05-30 PROCEDURE — 3079F PR MOST RECENT DIASTOLIC BLOOD PRESSURE 80-89 MM HG: ICD-10-PCS | Mod: CPTII,S$GLB,, | Performed by: NURSE PRACTITIONER

## 2023-05-30 PROCEDURE — 4010F ACE/ARB THERAPY RXD/TAKEN: CPT | Mod: CPTII,S$GLB,, | Performed by: NURSE PRACTITIONER

## 2023-05-30 PROCEDURE — 1159F MED LIST DOCD IN RCRD: CPT | Mod: CPTII,S$GLB,, | Performed by: NURSE PRACTITIONER

## 2023-05-30 PROCEDURE — 3079F DIAST BP 80-89 MM HG: CPT | Mod: CPTII,S$GLB,, | Performed by: NURSE PRACTITIONER

## 2023-05-30 PROCEDURE — 87088 URINE BACTERIA CULTURE: CPT

## 2023-05-30 PROCEDURE — 4010F PR ACE/ARB THEARPY RXD/TAKEN: ICD-10-PCS | Mod: CPTII,S$GLB,, | Performed by: NURSE PRACTITIONER

## 2023-05-30 PROCEDURE — 36415 COLL VENOUS BLD VENIPUNCTURE: CPT

## 2023-05-30 PROCEDURE — 3008F PR BODY MASS INDEX (BMI) DOCUMENTED: ICD-10-PCS | Mod: CPTII,S$GLB,, | Performed by: NURSE PRACTITIONER

## 2023-05-30 PROCEDURE — 3077F PR MOST RECENT SYSTOLIC BLOOD PRESSURE >= 140 MM HG: ICD-10-PCS | Mod: CPTII,S$GLB,, | Performed by: NURSE PRACTITIONER

## 2023-05-30 PROCEDURE — 99214 OFFICE O/P EST MOD 30 MIN: CPT | Mod: S$GLB,,, | Performed by: NURSE PRACTITIONER

## 2023-05-30 PROCEDURE — 1101F PT FALLS ASSESS-DOCD LE1/YR: CPT | Mod: CPTII,S$GLB,, | Performed by: NURSE PRACTITIONER

## 2023-05-30 PROCEDURE — 1159F PR MEDICATION LIST DOCUMENTED IN MEDICAL RECORD: ICD-10-PCS | Mod: CPTII,S$GLB,, | Performed by: NURSE PRACTITIONER

## 2023-05-30 PROCEDURE — 1160F RVW MEDS BY RX/DR IN RCRD: CPT | Mod: CPTII,S$GLB,, | Performed by: NURSE PRACTITIONER

## 2023-05-30 PROCEDURE — 3288F PR FALLS RISK ASSESSMENT DOCUMENTED: ICD-10-PCS | Mod: CPTII,S$GLB,, | Performed by: NURSE PRACTITIONER

## 2023-05-30 PROCEDURE — 99999 PR PBB SHADOW E&M-EST. PATIENT-LVL IV: ICD-10-PCS | Mod: PBBFAC,,, | Performed by: NURSE PRACTITIONER

## 2023-05-30 PROCEDURE — 99999 PR PBB SHADOW E&M-EST. PATIENT-LVL IV: CPT | Mod: PBBFAC,,, | Performed by: NURSE PRACTITIONER

## 2023-05-30 PROCEDURE — 87186 SC STD MICRODIL/AGAR DIL: CPT

## 2023-05-30 PROCEDURE — 80053 COMPREHEN METABOLIC PANEL: CPT

## 2023-05-30 PROCEDURE — 85025 COMPLETE CBC W/AUTO DIFF WBC: CPT

## 2023-05-30 PROCEDURE — 99214 PR OFFICE/OUTPT VISIT, EST, LEVL IV, 30-39 MIN: ICD-10-PCS | Mod: S$GLB,,, | Performed by: NURSE PRACTITIONER

## 2023-05-30 PROCEDURE — 3008F BODY MASS INDEX DOCD: CPT | Mod: CPTII,S$GLB,, | Performed by: NURSE PRACTITIONER

## 2023-05-30 PROCEDURE — 3077F SYST BP >= 140 MM HG: CPT | Mod: CPTII,S$GLB,, | Performed by: NURSE PRACTITIONER

## 2023-05-30 PROCEDURE — 3288F FALL RISK ASSESSMENT DOCD: CPT | Mod: CPTII,S$GLB,, | Performed by: NURSE PRACTITIONER

## 2023-05-30 NOTE — PROGRESS NOTES
HEMATOLOGY/ONCOLOGY OFFICE CLINIC VISIT    Visit Information:  Visit type:  On-going care, Review/discussion of tests, Scheduled follow-up.    Accompanied by:  No one.    Source of history:  Self.    Referral source:  Roc GIFFORD, Isaac HUANG    History limitation:  None.        Initial Consultation: 3/28/2022  Referring Physician: Dr Brian  Other Physicians:  Code Status: Not addressed    Diagnosis/Problem list:   Essential Thrombocytosis  --CALR mutation analysis, exon 9: Positive.    Present Treatment:  Hydrea 500 mg Daily + Agrylin 1 mg daily (2mg on Sat/Sun only)  Change to Hydrea 500 mg BID + Agrylin 1 mg daily (2/9/23)    Treatment history:    Hydrea       Imaging:      Pathology:  March 21, 2022 15:21 Peripheral blood, CALR mutation analysis, exon 9: Positive. 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 Peripheral blood, JAK2 V617F mutation analysis: Negative for JAK2 V617F.    CLINICAL HISTORY:       Patient: 72 years old female kindly referred for thrombocytosis.     Reviewing electronic on medical record it seems like her platelets were normal up to March 2018.  In March 2019 platelets were mildly elevated 496,009 slowly there were trending up with the most recent on 3/15/2022 of 1,012,000.  She is taking baby aspirin.    March 21, 2022 15:21 Peripheral blood, CALR mutation analysis, exon 9: Positive. 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  Peripheral blood, JAK2 V617F mutation analysis: Negative for JAK2 V617F.    She denies any family history of blood disorders or malignancies.        Chief Complaint: no complaints today    Here for ET    Interval History:  Patient presents today for 4 week follow up appointment.  She is taking Hydrea 500 mg BID and Agrylin 1 mg daily since 2/9/23. Platelet count decreased to 502 ( from 722k). WBC and H/H decreased but stable/improved. She is tolerating with no side effects except for itching. No rash. Only dry skin noted.  Otherwise,  "she is doing well. Reports increased energy level since changing to Hydrea 500 mg BID and Agrylin 1 mg daily. She denies any fever, chills, sweats.  No chest pain or shortness of breath.  No bleeding.     No headaches or changes in vision. No neurologic deficit.     ROS:  All 14 points ROS taken and as per Interval History    Histories:  PMH/PSH/FH/SOCIAL/ALLERGIES AND MEDS REVIEWED AND UPDATED AS APPROPRIATE       Vitals:    05/30/23 0842   BP: (!) 149/83   BP Location: Left arm   Patient Position: Sitting   BP Method: Medium (Automatic)   Pulse: 63   Resp: 18   Temp: 97.5 °F (36.4 °C)   TempSrc: Oral   SpO2: 97%   Weight: 78.8 kg (173 lb 11.2 oz)   Height: 5' 6" (1.676 m)            Physical Exam  Vitals and nursing note reviewed.   Constitutional:       General: She is not in acute distress.     Appearance: Normal appearance. She is well-developed.   HENT:      Head: Normocephalic and atraumatic.      Mouth/Throat:      Mouth: Mucous membranes are moist.   Eyes:      General: No scleral icterus.     Extraocular Movements: Extraocular movements intact.      Conjunctiva/sclera: Conjunctivae normal.      Pupils: Pupils are equal, round, and reactive to light.   Neck:      Vascular: No JVD.   Cardiovascular:      Rate and Rhythm: Normal rate and regular rhythm.      Heart sounds: No murmur heard.  Pulmonary:      Effort: Pulmonary effort is normal.      Breath sounds: Normal breath sounds. No wheezing or rhonchi.   Chest:      Chest wall: No deformity or tenderness.   Breasts:     Right: No swelling, mass, nipple discharge, skin change or tenderness.      Left: No swelling, mass, nipple discharge, skin change or tenderness.   Abdominal:      General: Bowel sounds are normal. There is no distension.      Palpations: Abdomen is soft. There is no splenomegaly or mass.      Tenderness: There is no abdominal tenderness.   Musculoskeletal:         General: No swelling or deformity.      Cervical back: Neck supple. "   Lymphadenopathy:      Cervical: No cervical adenopathy.      Upper Body:      Right upper body: No supraclavicular or axillary adenopathy.      Left upper body: No supraclavicular or axillary adenopathy.      Lower Body: No right inguinal adenopathy. No left inguinal adenopathy.   Skin:     General: Skin is warm.      Coloration: Skin is not jaundiced.      Findings: No lesion or rash.      Nails: There is no clubbing.   Neurological:      General: No focal deficit present.      Mental Status: She is alert and oriented to person, place, and time.      Sensory: Sensation is intact.      Motor: Motor function is intact.      Gait: Gait is intact.   Psychiatric:         Attention and Perception: Attention normal.         Mood and Affect: Mood and affect normal.         Speech: Speech normal.         Behavior: Behavior is cooperative.         Thought Content: Thought content normal.         Cognition and Memory: Cognition normal.         Judgment: Judgment normal.     ECOG SCORE    0 - Fully active-able to carry on all pre-disease performance without restriction         Laboratory:  CBC with Differential:  Lab Results   Component Value Date    WBC 3.34 (L) 05/30/2023    RBC 2.63 (L) 05/30/2023    HGB 10.1 (L) 05/30/2023    HCT 31.3 (L) 05/30/2023    .0 (H) 05/30/2023    MCH 38.4 (H) 05/30/2023    MCHC 32.3 (L) 05/30/2023    RDW 14.5 05/30/2023     (H) 05/30/2023    MPV 8.9 05/30/2023       Latest Reference Range & Units 05/23/22 08:40 06/09/22 07:37 06/23/22 11:27 07/07/22 07:27 07/21/22 11:18 08/04/22 08:10 08/18/22 10:52   WBC 4.5 - 11.5 x10(3)/mcL 2.6 (L) 3.3 (L) 4.0 (L) 3.1 (L) 3.4 (L) 3.5 (L) 4.1 (L)   (L): Data is abnormally low     Reference Range & Units 05/23/22 08:40 06/09/22 07:37 06/23/22 11:27 07/07/22 07:27 07/21/22 11:18 08/04/22 08:10 08/18/22 10:52   Platelets 130 - 400 x10(3)/mcL 449 (H) 557 (H) 612 (H) 623 (H) 646 (H) 673 (H) 590 (H)   (H): Data is abnormally high   Reference Range &  Units 05/23/22 08:40 06/09/22 07:37 06/23/22 11:27 07/07/22 07:27 07/21/22 11:18 08/04/22 08:10 08/18/22 10:52   Hemoglobin 12.0 - 16.0 gm/dL 10.5 (L) 10.6 (L) 10.8 (L) 10.9 (L) 11.0 (L) 10.8 (L) 11.3 (L)   (L): Data is abnormally low    CMP:  Sodium Level   Date Value Ref Range Status   05/09/2023 142 136 - 145 mmol/L Final     Potassium Level   Date Value Ref Range Status   05/09/2023 5.3 (H) 3.5 - 5.1 mmol/L Final     Carbon Dioxide   Date Value Ref Range Status   05/09/2023 25 23 - 31 mmol/L Final     Blood Urea Nitrogen   Date Value Ref Range Status   05/09/2023 19.4 9.8 - 20.1 mg/dL Final     Creatinine   Date Value Ref Range Status   05/09/2023 0.73 0.55 - 1.02 mg/dL Final     Calcium Level Total   Date Value Ref Range Status   05/09/2023 9.5 8.4 - 10.2 mg/dL Final     Albumin Level   Date Value Ref Range Status   05/09/2023 3.9 3.4 - 4.8 g/dL Final     Bilirubin Total   Date Value Ref Range Status   05/09/2023 0.4 <=1.5 mg/dL Final     Alkaline Phosphatase   Date Value Ref Range Status   05/09/2023 67 40 - 150 unit/L Final     Aspartate Aminotransferase   Date Value Ref Range Status   05/09/2023 17 5 - 34 unit/L Final     Alanine Aminotransferase   Date Value Ref Range Status   05/09/2023 10 0 - 55 unit/L Final     Estimated GFR-Non    Date Value Ref Range Status   08/04/2022 >60 mls/min/1.73/m2 Final         Assessment:         1. Essential thrombocytosis    2. Therapeutic drug monitoring                Essential thrombocytosis -- CALR mutation positive, 52 bp deletion Exon 9   --on Hydrea and Agrylin  Anemia-due to therapy  Leukopenia-due to therapy       Plan:       Thrombocytosis persists but within range for goal.        Mild leukopenia and anemia. We have tried adjusting hydrea and agrylin to imoroved her blood counts but then her platelet count increases too much.       Continue Hydrea to 500 mg bid and continue Agrylin 1 mg daily for now.       Cont ASA 81 mg daily     RTC 6 weeks  with CBC, CMP same day    Encouraged to call for any questions or problems  The patient was given ample opportunity to ask questions and they were all answered to satisfaction; patient demonstrated understanding of what we discussed and is agreeable to the plan.    GORDON Collazo

## 2023-05-31 DIAGNOSIS — R30.0 BURNING WITH URINATION: Primary | ICD-10-CM

## 2023-05-31 RX ORDER — SULFAMETHOXAZOLE AND TRIMETHOPRIM 800; 160 MG/1; MG/1
1 TABLET ORAL 2 TIMES DAILY
Qty: 14 TABLET | Refills: 0 | Status: SHIPPED | OUTPATIENT
Start: 2023-05-31 | End: 2023-06-07

## 2023-06-01 LAB — BACTERIA UR CULT: ABNORMAL

## 2023-07-11 ENCOUNTER — OFFICE VISIT (OUTPATIENT)
Dept: HEMATOLOGY/ONCOLOGY | Facility: CLINIC | Age: 73
End: 2023-07-11
Payer: MEDICARE

## 2023-07-11 VITALS
OXYGEN SATURATION: 98 % | DIASTOLIC BLOOD PRESSURE: 78 MMHG | SYSTOLIC BLOOD PRESSURE: 132 MMHG | HEIGHT: 66 IN | TEMPERATURE: 98 F | HEART RATE: 68 BPM | BODY MASS INDEX: 28.1 KG/M2 | WEIGHT: 174.88 LBS

## 2023-07-11 DIAGNOSIS — Z51.81 THERAPEUTIC DRUG MONITORING: ICD-10-CM

## 2023-07-11 DIAGNOSIS — D47.3 ESSENTIAL THROMBOCYTOSIS: Primary | ICD-10-CM

## 2023-07-11 PROCEDURE — 1159F MED LIST DOCD IN RCRD: CPT | Mod: CPTII,S$GLB,, | Performed by: NURSE PRACTITIONER

## 2023-07-11 PROCEDURE — 3075F PR MOST RECENT SYSTOLIC BLOOD PRESS GE 130-139MM HG: ICD-10-PCS | Mod: CPTII,S$GLB,, | Performed by: NURSE PRACTITIONER

## 2023-07-11 PROCEDURE — 3078F DIAST BP <80 MM HG: CPT | Mod: CPTII,S$GLB,, | Performed by: NURSE PRACTITIONER

## 2023-07-11 PROCEDURE — 1159F PR MEDICATION LIST DOCUMENTED IN MEDICAL RECORD: ICD-10-PCS | Mod: CPTII,S$GLB,, | Performed by: NURSE PRACTITIONER

## 2023-07-11 PROCEDURE — 4010F ACE/ARB THERAPY RXD/TAKEN: CPT | Mod: CPTII,S$GLB,, | Performed by: NURSE PRACTITIONER

## 2023-07-11 PROCEDURE — 1160F PR REVIEW ALL MEDS BY PRESCRIBER/CLIN PHARMACIST DOCUMENTED: ICD-10-PCS | Mod: CPTII,S$GLB,, | Performed by: NURSE PRACTITIONER

## 2023-07-11 PROCEDURE — 1101F PR PT FALLS ASSESS DOC 0-1 FALLS W/OUT INJ PAST YR: ICD-10-PCS | Mod: CPTII,S$GLB,, | Performed by: NURSE PRACTITIONER

## 2023-07-11 PROCEDURE — 99999 PR PBB SHADOW E&M-EST. PATIENT-LVL III: CPT | Mod: PBBFAC,,, | Performed by: NURSE PRACTITIONER

## 2023-07-11 PROCEDURE — 3008F PR BODY MASS INDEX (BMI) DOCUMENTED: ICD-10-PCS | Mod: CPTII,S$GLB,, | Performed by: NURSE PRACTITIONER

## 2023-07-11 PROCEDURE — 4010F PR ACE/ARB THEARPY RXD/TAKEN: ICD-10-PCS | Mod: CPTII,S$GLB,, | Performed by: NURSE PRACTITIONER

## 2023-07-11 PROCEDURE — 1101F PT FALLS ASSESS-DOCD LE1/YR: CPT | Mod: CPTII,S$GLB,, | Performed by: NURSE PRACTITIONER

## 2023-07-11 PROCEDURE — 3075F SYST BP GE 130 - 139MM HG: CPT | Mod: CPTII,S$GLB,, | Performed by: NURSE PRACTITIONER

## 2023-07-11 PROCEDURE — 3078F PR MOST RECENT DIASTOLIC BLOOD PRESSURE < 80 MM HG: ICD-10-PCS | Mod: CPTII,S$GLB,, | Performed by: NURSE PRACTITIONER

## 2023-07-11 PROCEDURE — 3008F BODY MASS INDEX DOCD: CPT | Mod: CPTII,S$GLB,, | Performed by: NURSE PRACTITIONER

## 2023-07-11 PROCEDURE — 99214 OFFICE O/P EST MOD 30 MIN: CPT | Mod: S$GLB,,, | Performed by: NURSE PRACTITIONER

## 2023-07-11 PROCEDURE — 99214 PR OFFICE/OUTPT VISIT, EST, LEVL IV, 30-39 MIN: ICD-10-PCS | Mod: S$GLB,,, | Performed by: NURSE PRACTITIONER

## 2023-07-11 PROCEDURE — 99999 PR PBB SHADOW E&M-EST. PATIENT-LVL III: ICD-10-PCS | Mod: PBBFAC,,, | Performed by: NURSE PRACTITIONER

## 2023-07-11 PROCEDURE — 3288F PR FALLS RISK ASSESSMENT DOCUMENTED: ICD-10-PCS | Mod: CPTII,S$GLB,, | Performed by: NURSE PRACTITIONER

## 2023-07-11 PROCEDURE — 1126F PR PAIN SEVERITY QUANTIFIED, NO PAIN PRESENT: ICD-10-PCS | Mod: CPTII,S$GLB,, | Performed by: NURSE PRACTITIONER

## 2023-07-11 PROCEDURE — 3288F FALL RISK ASSESSMENT DOCD: CPT | Mod: CPTII,S$GLB,, | Performed by: NURSE PRACTITIONER

## 2023-07-11 PROCEDURE — 1126F AMNT PAIN NOTED NONE PRSNT: CPT | Mod: CPTII,S$GLB,, | Performed by: NURSE PRACTITIONER

## 2023-07-11 PROCEDURE — 1160F RVW MEDS BY RX/DR IN RCRD: CPT | Mod: CPTII,S$GLB,, | Performed by: NURSE PRACTITIONER

## 2023-07-11 NOTE — PROGRESS NOTES
HEMATOLOGY/ONCOLOGY OFFICE CLINIC VISIT    Visit Information:  Visit type:  On-going care, Review/discussion of tests, Scheduled follow-up.    Accompanied by:  No one.    Source of history:  Self.    Referral source:  Roc GIFFORD, Isaac HUANG    History limitation:  None.        Initial Consultation: 3/28/2022  Referring Physician: Dr Brian  Other Physicians:  Code Status: Not addressed    Diagnosis/Problem list:   Essential Thrombocytosis  --CALR mutation analysis, exon 9: Positive.    Present Treatment:  Hydrea 500 mg Daily + Agrylin 1 mg daily (2mg on Sat/Sun only)  Change to Hydrea 500 mg BID + Agrylin 1 mg daily (2/9/23)    Treatment history:    Hydrea       Imaging:      Pathology:  March 21, 2022 15:21 Peripheral blood, CALR mutation analysis, exon 9: Positive. 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 Peripheral blood, JAK2 V617F mutation analysis: Negative for JAK2 V617F.    CLINICAL HISTORY:       Patient: 72 years old female kindly referred for thrombocytosis.     Reviewing electronic on medical record it seems like her platelets were normal up to March 2018.  In March 2019 platelets were mildly elevated 496,009 slowly there were trending up with the most recent on 3/15/2022 of 1,012,000.  She is taking baby aspirin.    March 21, 2022 15:21 Peripheral blood, CALR mutation analysis, exon 9: Positive. 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  Peripheral blood, JAK2 V617F mutation analysis: Negative for JAK2 V617F.    She denies any family history of blood disorders or malignancies.        Chief Complaint: OTHER (No concerns today.)    Here for ET    Interval History:  Patient presents today for 6 week follow up appointment.  She is taking Hydrea 500 mg BID and Agrylin 1 mg daily since 2/9/23. Platelet count is 531k. WBC decreased to 2.85 with ANC of 1.7. and H/H decreased but stable/improved. She is tolerating with no side effects. She denies any signs or symptoms of  "infection.  Otherwise, she is doing well. Reports increased energy level since changing to Hydrea 500 mg BID and Agrylin 1 mg daily. She denies any fever, chills, sweats.  No chest pain or shortness of breath.  No bleeding.     No headaches or changes in vision. No neurologic deficit.     ROS:  All 14 points ROS taken and as per Interval History    Histories:  PMH/PSH/FH/SOCIAL/ALLERGIES AND MEDS REVIEWED AND UPDATED AS APPROPRIATE       Vitals:    07/11/23 1042   BP: 132/78   BP Location: Left arm   Patient Position: Sitting   Pulse: 68   Temp: 97.5 °F (36.4 °C)   TempSrc: Oral   SpO2: 98%   Weight: 79.3 kg (174 lb 14.4 oz)   Height: 5' 6" (1.676 m)              Physical Exam  Vitals and nursing note reviewed.   Constitutional:       General: She is not in acute distress.     Appearance: Normal appearance. She is well-developed.   HENT:      Head: Normocephalic and atraumatic.      Mouth/Throat:      Mouth: Mucous membranes are moist.   Eyes:      General: No scleral icterus.     Extraocular Movements: Extraocular movements intact.      Conjunctiva/sclera: Conjunctivae normal.      Pupils: Pupils are equal, round, and reactive to light.   Neck:      Vascular: No JVD.   Cardiovascular:      Rate and Rhythm: Normal rate and regular rhythm.      Heart sounds: No murmur heard.  Pulmonary:      Effort: Pulmonary effort is normal.      Breath sounds: Normal breath sounds. No wheezing or rhonchi.   Chest:      Chest wall: No deformity or tenderness.   Breasts:     Right: No swelling, mass, nipple discharge, skin change or tenderness.      Left: No swelling, mass, nipple discharge, skin change or tenderness.   Abdominal:      General: Bowel sounds are normal. There is no distension.      Palpations: Abdomen is soft. There is no splenomegaly or mass.      Tenderness: There is no abdominal tenderness.   Musculoskeletal:         General: No swelling or deformity.      Cervical back: Neck supple.   Lymphadenopathy:      " Cervical: No cervical adenopathy.      Upper Body:      Right upper body: No supraclavicular or axillary adenopathy.      Left upper body: No supraclavicular or axillary adenopathy.      Lower Body: No right inguinal adenopathy. No left inguinal adenopathy.   Skin:     General: Skin is warm.      Coloration: Skin is not jaundiced.      Findings: No lesion or rash.      Nails: There is no clubbing.   Neurological:      General: No focal deficit present.      Mental Status: She is alert and oriented to person, place, and time.      Sensory: Sensation is intact.      Motor: Motor function is intact.      Gait: Gait is intact.   Psychiatric:         Attention and Perception: Attention normal.         Mood and Affect: Mood and affect normal.         Speech: Speech normal.         Behavior: Behavior is cooperative.         Thought Content: Thought content normal.         Cognition and Memory: Cognition normal.         Judgment: Judgment normal.     ECOG SCORE             Laboratory:  CBC with Differential:  Lab Results   Component Value Date    WBC 2.85 (L) 07/11/2023    RBC 2.67 (L) 07/11/2023    HGB 10.5 (L) 07/11/2023    HCT 31.6 (L) 07/11/2023    .4 (H) 07/11/2023    MCH 39.3 (H) 07/11/2023    MCHC 33.2 07/11/2023    RDW 12.4 07/11/2023     (H) 07/11/2023    MPV 8.8 07/11/2023       Latest Reference Range & Units 05/23/22 08:40 06/09/22 07:37 06/23/22 11:27 07/07/22 07:27 07/21/22 11:18 08/04/22 08:10 08/18/22 10:52   WBC 4.5 - 11.5 x10(3)/mcL 2.6 (L) 3.3 (L) 4.0 (L) 3.1 (L) 3.4 (L) 3.5 (L) 4.1 (L)   (L): Data is abnormally low     Reference Range & Units 05/23/22 08:40 06/09/22 07:37 06/23/22 11:27 07/07/22 07:27 07/21/22 11:18 08/04/22 08:10 08/18/22 10:52   Platelets 130 - 400 x10(3)/mcL 449 (H) 557 (H) 612 (H) 623 (H) 646 (H) 673 (H) 590 (H)   (H): Data is abnormally high   Reference Range & Units 05/23/22 08:40 06/09/22 07:37 06/23/22 11:27 07/07/22 07:27 07/21/22 11:18 08/04/22 08:10 08/18/22 10:52    Hemoglobin 12.0 - 16.0 gm/dL 10.5 (L) 10.6 (L) 10.8 (L) 10.9 (L) 11.0 (L) 10.8 (L) 11.3 (L)   (L): Data is abnormally low    CMP:  Sodium Level   Date Value Ref Range Status   05/30/2023 140 136 - 145 mmol/L Final     Potassium Level   Date Value Ref Range Status   05/30/2023 5.1 3.5 - 5.1 mmol/L Final     Carbon Dioxide   Date Value Ref Range Status   05/30/2023 27 23 - 31 mmol/L Final     Blood Urea Nitrogen   Date Value Ref Range Status   05/30/2023 23.3 (H) 9.8 - 20.1 mg/dL Final     Creatinine   Date Value Ref Range Status   05/30/2023 0.69 0.55 - 1.02 mg/dL Final     Calcium Level Total   Date Value Ref Range Status   05/30/2023 9.2 8.4 - 10.2 mg/dL Final     Albumin Level   Date Value Ref Range Status   05/30/2023 4.1 3.4 - 4.8 g/dL Final     Bilirubin Total   Date Value Ref Range Status   05/30/2023 0.5 <=1.5 mg/dL Final     Alkaline Phosphatase   Date Value Ref Range Status   05/30/2023 72 40 - 150 unit/L Final     Aspartate Aminotransferase   Date Value Ref Range Status   05/30/2023 17 5 - 34 unit/L Final     Alanine Aminotransferase   Date Value Ref Range Status   05/30/2023 10 0 - 55 unit/L Final     Estimated GFR-Non    Date Value Ref Range Status   08/04/2022 >60 mls/min/1.73/m2 Final         Assessment:         1. Essential thrombocytosis    2. Therapeutic drug monitoring                  Essential thrombocytosis -- CALR mutation positive, 52 bp deletion Exon 9   --on Hydrea and Agrylin  Anemia-due to therapy  Leukopenia-due to therapy       Plan:       Thrombocytosis persists but within range for goal.       leukopenia and mild neutropenia and anemia stable/improved.  We have tried adjusting hydrea and agrylin to improve her blood counts but then her platelet count increases too much.       Continue Hydrea to 500 mg bid and continue Agrylin 1 mg daily for now but will monitor closely and recheck CBC in 2 weeks. If neutropenia worsens, will make dosing adjustment.       Cont ASA 81  mg daily     RTC 4 weeks with CBC, CMP same day    Encouraged to call for any questions or problems  The patient was given ample opportunity to ask questions and they were all answered to satisfaction; patient demonstrated understanding of what we discussed and is agreeable to the plan.    GORDON Collazo

## 2023-07-26 ENCOUNTER — LAB VISIT (OUTPATIENT)
Dept: LAB | Facility: HOSPITAL | Age: 73
End: 2023-07-26
Attending: INTERNAL MEDICINE
Payer: MEDICARE

## 2023-07-26 DIAGNOSIS — D47.3 ESSENTIAL THROMBOCYTOSIS: ICD-10-CM

## 2023-07-26 DIAGNOSIS — Z51.81 THERAPEUTIC DRUG MONITORING: ICD-10-CM

## 2023-07-26 LAB
BASOPHILS # BLD AUTO: 0.04 X10(3)/MCL
BASOPHILS NFR BLD AUTO: 1.7 %
EOSINOPHIL # BLD AUTO: 0.02 X10(3)/MCL (ref 0–0.9)
EOSINOPHIL NFR BLD AUTO: 0.8 %
ERYTHROCYTE [DISTWIDTH] IN BLOOD BY AUTOMATED COUNT: 12.1 % (ref 11.5–17)
HCT VFR BLD AUTO: 32.4 % (ref 37–47)
HGB BLD-MCNC: 10.8 G/DL (ref 12–16)
IMM GRANULOCYTES # BLD AUTO: 0.01 X10(3)/MCL (ref 0–0.04)
IMM GRANULOCYTES NFR BLD AUTO: 0.4 %
LYMPHOCYTES # BLD AUTO: 0.6 X10(3)/MCL (ref 0.6–4.6)
LYMPHOCYTES NFR BLD AUTO: 24.8 %
MCH RBC QN AUTO: 39.1 PG (ref 27–31)
MCHC RBC AUTO-ENTMCNC: 33.3 G/DL (ref 33–36)
MCV RBC AUTO: 117.4 FL (ref 80–94)
MONOCYTES # BLD AUTO: 0.3 X10(3)/MCL (ref 0.1–1.3)
MONOCYTES NFR BLD AUTO: 12.4 %
NEUTROPHILS # BLD AUTO: 1.45 X10(3)/MCL (ref 2.1–9.2)
NEUTROPHILS NFR BLD AUTO: 59.9 %
PLATELET # BLD AUTO: 506 X10(3)/MCL (ref 130–400)
PMV BLD AUTO: 8.8 FL (ref 7.4–10.4)
RBC # BLD AUTO: 2.76 X10(6)/MCL (ref 4.2–5.4)
WBC # SPEC AUTO: 2.42 X10(3)/MCL (ref 4.5–11.5)

## 2023-07-26 PROCEDURE — 36415 COLL VENOUS BLD VENIPUNCTURE: CPT

## 2023-07-26 PROCEDURE — 85025 COMPLETE CBC W/AUTO DIFF WBC: CPT

## 2023-07-28 DIAGNOSIS — D47.3 ESSENTIAL THROMBOCYTOSIS: ICD-10-CM

## 2023-07-28 RX ORDER — ANAGRELIDE 1 MG/1
CAPSULE ORAL
Qty: 30 CAPSULE | Refills: 0 | Status: SHIPPED | OUTPATIENT
Start: 2023-07-28 | End: 2023-09-12 | Stop reason: SDUPTHER

## 2023-08-09 ENCOUNTER — OFFICE VISIT (OUTPATIENT)
Dept: HEMATOLOGY/ONCOLOGY | Facility: CLINIC | Age: 73
End: 2023-08-09
Payer: MEDICARE

## 2023-08-09 VITALS — BODY MASS INDEX: 28.34 KG/M2 | WEIGHT: 175.63 LBS

## 2023-08-09 DIAGNOSIS — Z51.81 THERAPEUTIC DRUG MONITORING: ICD-10-CM

## 2023-08-09 DIAGNOSIS — D47.3 ESSENTIAL THROMBOCYTOSIS: Primary | ICD-10-CM

## 2023-08-09 PROCEDURE — 1159F PR MEDICATION LIST DOCUMENTED IN MEDICAL RECORD: ICD-10-PCS | Mod: CPTII,S$GLB,, | Performed by: NURSE PRACTITIONER

## 2023-08-09 PROCEDURE — 99215 OFFICE O/P EST HI 40 MIN: CPT | Mod: S$GLB,,, | Performed by: NURSE PRACTITIONER

## 2023-08-09 PROCEDURE — 1101F PR PT FALLS ASSESS DOC 0-1 FALLS W/OUT INJ PAST YR: ICD-10-PCS | Mod: CPTII,S$GLB,, | Performed by: NURSE PRACTITIONER

## 2023-08-09 PROCEDURE — 1159F MED LIST DOCD IN RCRD: CPT | Mod: CPTII,S$GLB,, | Performed by: NURSE PRACTITIONER

## 2023-08-09 PROCEDURE — 99999 PR PBB SHADOW E&M-EST. PATIENT-LVL II: ICD-10-PCS | Mod: PBBFAC,,, | Performed by: NURSE PRACTITIONER

## 2023-08-09 PROCEDURE — 4010F PR ACE/ARB THEARPY RXD/TAKEN: ICD-10-PCS | Mod: CPTII,S$GLB,, | Performed by: NURSE PRACTITIONER

## 2023-08-09 PROCEDURE — 3288F FALL RISK ASSESSMENT DOCD: CPT | Mod: CPTII,S$GLB,, | Performed by: NURSE PRACTITIONER

## 2023-08-09 PROCEDURE — 99999 PR PBB SHADOW E&M-EST. PATIENT-LVL II: CPT | Mod: PBBFAC,,, | Performed by: NURSE PRACTITIONER

## 2023-08-09 PROCEDURE — 3008F BODY MASS INDEX DOCD: CPT | Mod: CPTII,S$GLB,, | Performed by: NURSE PRACTITIONER

## 2023-08-09 PROCEDURE — 1101F PT FALLS ASSESS-DOCD LE1/YR: CPT | Mod: CPTII,S$GLB,, | Performed by: NURSE PRACTITIONER

## 2023-08-09 PROCEDURE — 3008F PR BODY MASS INDEX (BMI) DOCUMENTED: ICD-10-PCS | Mod: CPTII,S$GLB,, | Performed by: NURSE PRACTITIONER

## 2023-08-09 PROCEDURE — 4010F ACE/ARB THERAPY RXD/TAKEN: CPT | Mod: CPTII,S$GLB,, | Performed by: NURSE PRACTITIONER

## 2023-08-09 PROCEDURE — 3288F PR FALLS RISK ASSESSMENT DOCUMENTED: ICD-10-PCS | Mod: CPTII,S$GLB,, | Performed by: NURSE PRACTITIONER

## 2023-08-09 PROCEDURE — 99215 PR OFFICE/OUTPT VISIT, EST, LEVL V, 40-54 MIN: ICD-10-PCS | Mod: S$GLB,,, | Performed by: NURSE PRACTITIONER

## 2023-08-09 NOTE — PROGRESS NOTES
HEMATOLOGY/ONCOLOGY OFFICE CLINIC VISIT    Visit Information:  Visit type:  On-going care, Review/discussion of tests, Scheduled follow-up.    Accompanied by:  No one.    Source of history:  Self.    Referral source:  Roc GIFFORD, Isaac HUANG    History limitation:  None.        Initial Consultation: 3/28/2022  Referring Physician: Dr Brian  Other Physicians:  Code Status: Not addressed    Diagnosis/Problem list:   Essential Thrombocytosis  --CALR mutation analysis, exon 9: Positive.    Present Treatment:  Hydrea 500 mg Daily + Agrylin 1 mg daily (2mg on Sat/Sun only)  Change to Hydrea 500 mg BID + Agrylin 1 mg daily (2/9/23)    Treatment history:    Hydrea       Imaging:      Pathology:  March 21, 2022 15:21 Peripheral blood, CALR mutation analysis, exon 9: Positive. 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 Peripheral blood, JAK2 V617F mutation analysis: Negative for JAK2 V617F.    CLINICAL HISTORY:       Patient: 72 years old female kindly referred for thrombocytosis.     Reviewing electronic on medical record it seems like her platelets were normal up to March 2018.  In March 2019 platelets were mildly elevated 496,009 slowly there were trending up with the most recent on 3/15/2022 of 1,012,000.  She is taking baby aspirin.    March 21, 2022 15:21 Peripheral blood, CALR mutation analysis, exon 9: Positive. 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  Peripheral blood, JAK2 V617F mutation analysis: Negative for JAK2 V617F.    She denies any family history of blood disorders or malignancies.        Chief Complaint: No chief complaint on file.    Here for ET    Interval History:  Patient presents today for 4 week follow up appointment.  She is taking Hydrea 500 mg BID and Agrylin 1 mg daily since 2/9/23. Platelet count is 518k. WBC decreased to 2.47 with ANC of 1.4. two weeks ago but it increased to 3.15 with ANC of 2.03. H/H stable. She is tolerating with no side effects. She denies  any signs or symptoms of infection.  She denies any fever, chills, sweats.  No chest pain or shortness of breath.  No bleeding.   No headaches or changes in vision. No neurologic deficit.     Review of Systems   Respiratory:  Positive for cough. Negative for shortness of breath.    Cardiovascular:  Negative for chest pain.   Gastrointestinal:  Negative for abdominal pain and diarrhea.   Genitourinary:  Negative for frequency.   Musculoskeletal:  Positive for back pain.   Skin:  Negative for rash.   Neurological:  Negative for headaches.   Psychiatric/Behavioral:  The patient is nervous/anxious.          Histories:  PMH/PSH/FH/SOCIAL/ALLERGIES AND MEDS REVIEWED AND UPDATED AS APPROPRIATE       Vitals:    08/09/23 1006   Weight: 79.7 kg (175 lb 9.6 oz)                Physical Exam  Vitals and nursing note reviewed.   Constitutional:       General: She is not in acute distress.     Appearance: Normal appearance. She is well-developed.   HENT:      Head: Normocephalic and atraumatic.      Mouth/Throat:      Mouth: Mucous membranes are moist.   Eyes:      General: No scleral icterus.     Extraocular Movements: Extraocular movements intact.      Conjunctiva/sclera: Conjunctivae normal.      Pupils: Pupils are equal, round, and reactive to light.   Neck:      Vascular: No JVD.   Cardiovascular:      Rate and Rhythm: Normal rate and regular rhythm.      Heart sounds: No murmur heard.  Pulmonary:      Effort: Pulmonary effort is normal.      Breath sounds: Normal breath sounds. No wheezing or rhonchi.   Chest:      Chest wall: No deformity or tenderness.   Breasts:     Right: No swelling, mass, nipple discharge, skin change or tenderness.      Left: No swelling, mass, nipple discharge, skin change or tenderness.   Abdominal:      General: Bowel sounds are normal. There is no distension.      Palpations: Abdomen is soft. There is no splenomegaly or mass.      Tenderness: There is no abdominal tenderness.   Musculoskeletal:          General: No swelling or deformity.      Cervical back: Neck supple.   Lymphadenopathy:      Cervical: No cervical adenopathy.      Upper Body:      Right upper body: No supraclavicular or axillary adenopathy.      Left upper body: No supraclavicular or axillary adenopathy.      Lower Body: No right inguinal adenopathy. No left inguinal adenopathy.   Skin:     General: Skin is warm.      Coloration: Skin is not jaundiced.      Findings: No lesion or rash.      Nails: There is no clubbing.   Neurological:      General: No focal deficit present.      Mental Status: She is alert and oriented to person, place, and time.      Sensory: Sensation is intact.      Motor: Motor function is intact.      Gait: Gait is intact.   Psychiatric:         Attention and Perception: Attention normal.         Mood and Affect: Mood and affect normal.         Speech: Speech normal.         Behavior: Behavior is cooperative.         Thought Content: Thought content normal.         Cognition and Memory: Cognition normal.         Judgment: Judgment normal.       ECOG SCORE             Laboratory:  CBC with Differential:  Lab Results   Component Value Date    WBC 3.15 (L) 08/09/2023    RBC 2.77 (L) 08/09/2023    HGB 10.6 (L) 08/09/2023    HCT 32.8 (L) 08/09/2023    .4 (H) 08/09/2023    MCH 38.3 (H) 08/09/2023    MCHC 32.3 (L) 08/09/2023    RDW 12.3 08/09/2023     (H) 08/09/2023    MPV 8.8 08/09/2023       Latest Reference Range & Units 05/23/22 08:40 06/09/22 07:37 06/23/22 11:27 07/07/22 07:27 07/21/22 11:18 08/04/22 08:10 08/18/22 10:52   WBC 4.5 - 11.5 x10(3)/mcL 2.6 (L) 3.3 (L) 4.0 (L) 3.1 (L) 3.4 (L) 3.5 (L) 4.1 (L)   (L): Data is abnormally low     Reference Range & Units 05/23/22 08:40 06/09/22 07:37 06/23/22 11:27 07/07/22 07:27 07/21/22 11:18 08/04/22 08:10 08/18/22 10:52   Platelets 130 - 400 x10(3)/mcL 449 (H) 557 (H) 612 (H) 623 (H) 646 (H) 673 (H) 590 (H)   (H): Data is abnormally high   Reference Range & Units  05/23/22 08:40 06/09/22 07:37 06/23/22 11:27 07/07/22 07:27 07/21/22 11:18 08/04/22 08:10 08/18/22 10:52   Hemoglobin 12.0 - 16.0 gm/dL 10.5 (L) 10.6 (L) 10.8 (L) 10.9 (L) 11.0 (L) 10.8 (L) 11.3 (L)   (L): Data is abnormally low    CMP:  Sodium Level   Date Value Ref Range Status   08/09/2023 141 136 - 145 mmol/L Final     Potassium Level   Date Value Ref Range Status   08/09/2023 5.3 (H) 3.5 - 5.1 mmol/L Final     Carbon Dioxide   Date Value Ref Range Status   08/09/2023 29 23 - 31 mmol/L Final     Blood Urea Nitrogen   Date Value Ref Range Status   08/09/2023 19.5 9.8 - 20.1 mg/dL Final     Creatinine   Date Value Ref Range Status   08/09/2023 0.65 0.55 - 1.02 mg/dL Final     Calcium Level Total   Date Value Ref Range Status   08/09/2023 9.5 8.4 - 10.2 mg/dL Final     Albumin Level   Date Value Ref Range Status   08/09/2023 4.1 3.4 - 4.8 g/dL Final     Bilirubin Total   Date Value Ref Range Status   08/09/2023 0.5 <=1.5 mg/dL Final     Alkaline Phosphatase   Date Value Ref Range Status   08/09/2023 67 40 - 150 unit/L Final     Aspartate Aminotransferase   Date Value Ref Range Status   08/09/2023 21 5 - 34 unit/L Final     Alanine Aminotransferase   Date Value Ref Range Status   08/09/2023 13 0 - 55 unit/L Final     Estimated GFR-Non    Date Value Ref Range Status   08/04/2022 >60 mls/min/1.73/m2 Final         Assessment:         1. Essential thrombocytosis    2. Therapeutic drug monitoring                    Essential thrombocytosis -- CALR mutation positive, 52 bp deletion Exon 9   --on Hydrea and Agrylin  Anemia-due to therapy  Leukopenia-due to therapy       Plan:       Thrombocytosis persists but within range for goal.       leukopenia and mild neutropenia and anemia stable/improved.  We have tried adjusting hydrea and agrylin to improve her blood counts but then her platelet count increases too much.       Continue Hydrea to 500 mg bid and continue Agrylin 1 mg daily for now but will monitor  closely.  If neutropenia or anemia worsens, will make dosing adjustment.       Cont ASA 81 mg daily     RTC 4 weeks with CBC, CMP same day    Encouraged to call for any questions or problems  The patient was given ample opportunity to ask questions and they were all answered to satisfaction; patient demonstrated understanding of what we discussed and is agreeable to the plan.    AAKASH CollazoP

## 2023-09-12 ENCOUNTER — OFFICE VISIT (OUTPATIENT)
Dept: HEMATOLOGY/ONCOLOGY | Facility: CLINIC | Age: 73
End: 2023-09-12
Payer: MEDICARE

## 2023-09-12 ENCOUNTER — LAB VISIT (OUTPATIENT)
Dept: LAB | Facility: HOSPITAL | Age: 73
End: 2023-09-12
Attending: FAMILY MEDICINE
Payer: MEDICARE

## 2023-09-12 VITALS
WEIGHT: 176.31 LBS | RESPIRATION RATE: 18 BRPM | BODY MASS INDEX: 28.33 KG/M2 | HEIGHT: 66 IN | DIASTOLIC BLOOD PRESSURE: 83 MMHG | OXYGEN SATURATION: 100 % | SYSTOLIC BLOOD PRESSURE: 148 MMHG | HEART RATE: 65 BPM | TEMPERATURE: 98 F

## 2023-09-12 DIAGNOSIS — D47.3 ESSENTIAL THROMBOCYTOSIS: ICD-10-CM

## 2023-09-12 DIAGNOSIS — Z51.81 THERAPEUTIC DRUG MONITORING: ICD-10-CM

## 2023-09-12 LAB
ALBUMIN SERPL-MCNC: 4.1 G/DL (ref 3.4–4.8)
ALBUMIN/GLOB SERPL: 1.4 RATIO (ref 1.1–2)
ALP SERPL-CCNC: 68 UNIT/L (ref 40–150)
ALT SERPL-CCNC: 11 UNIT/L (ref 0–55)
AST SERPL-CCNC: 18 UNIT/L (ref 5–34)
BASOPHILS # BLD AUTO: 0.03 X10(3)/MCL
BASOPHILS NFR BLD AUTO: 0.8 %
BILIRUB SERPL-MCNC: 0.4 MG/DL
BUN SERPL-MCNC: 14.2 MG/DL (ref 9.8–20.1)
CALCIUM SERPL-MCNC: 9.1 MG/DL (ref 8.4–10.2)
CHLORIDE SERPL-SCNC: 109 MMOL/L (ref 98–107)
CO2 SERPL-SCNC: 27 MMOL/L (ref 23–31)
CREAT SERPL-MCNC: 0.64 MG/DL (ref 0.55–1.02)
EOSINOPHIL # BLD AUTO: 0.01 X10(3)/MCL (ref 0–0.9)
EOSINOPHIL NFR BLD AUTO: 0.3 %
ERYTHROCYTE [DISTWIDTH] IN BLOOD BY AUTOMATED COUNT: 12.9 % (ref 11.5–17)
GFR SERPLBLD CREATININE-BSD FMLA CKD-EPI: >60 MLS/MIN/1.73/M2
GLOBULIN SER-MCNC: 2.9 GM/DL (ref 2.4–3.5)
GLUCOSE SERPL-MCNC: 92 MG/DL (ref 82–115)
HCT VFR BLD AUTO: 30.2 % (ref 37–47)
HGB BLD-MCNC: 9.8 G/DL (ref 12–16)
IMM GRANULOCYTES # BLD AUTO: 0 X10(3)/MCL (ref 0–0.04)
IMM GRANULOCYTES NFR BLD AUTO: 0 %
LYMPHOCYTES # BLD AUTO: 0.66 X10(3)/MCL (ref 0.6–4.6)
LYMPHOCYTES NFR BLD AUTO: 18.7 %
MCH RBC QN AUTO: 37.8 PG (ref 27–31)
MCHC RBC AUTO-ENTMCNC: 32.5 G/DL (ref 33–36)
MCV RBC AUTO: 116.6 FL (ref 80–94)
MONOCYTES # BLD AUTO: 0.35 X10(3)/MCL (ref 0.1–1.3)
MONOCYTES NFR BLD AUTO: 9.9 %
NEUTROPHILS # BLD AUTO: 2.48 X10(3)/MCL (ref 2.1–9.2)
NEUTROPHILS NFR BLD AUTO: 70.3 %
PLATELET # BLD AUTO: 464 X10(3)/MCL (ref 130–400)
PMV BLD AUTO: 8.7 FL (ref 7.4–10.4)
POTASSIUM SERPL-SCNC: 4.9 MMOL/L (ref 3.5–5.1)
PROT SERPL-MCNC: 7 GM/DL (ref 5.8–7.6)
RBC # BLD AUTO: 2.59 X10(6)/MCL (ref 4.2–5.4)
SODIUM SERPL-SCNC: 144 MMOL/L (ref 136–145)
WBC # SPEC AUTO: 3.53 X10(3)/MCL (ref 4.5–11.5)

## 2023-09-12 PROCEDURE — 1159F PR MEDICATION LIST DOCUMENTED IN MEDICAL RECORD: ICD-10-PCS | Mod: CPTII,S$GLB,, | Performed by: NURSE PRACTITIONER

## 2023-09-12 PROCEDURE — 1159F MED LIST DOCD IN RCRD: CPT | Mod: CPTII,S$GLB,, | Performed by: NURSE PRACTITIONER

## 2023-09-12 PROCEDURE — 4010F PR ACE/ARB THEARPY RXD/TAKEN: ICD-10-PCS | Mod: CPTII,S$GLB,, | Performed by: NURSE PRACTITIONER

## 2023-09-12 PROCEDURE — 3079F PR MOST RECENT DIASTOLIC BLOOD PRESSURE 80-89 MM HG: ICD-10-PCS | Mod: CPTII,S$GLB,, | Performed by: NURSE PRACTITIONER

## 2023-09-12 PROCEDURE — 3288F FALL RISK ASSESSMENT DOCD: CPT | Mod: CPTII,S$GLB,, | Performed by: NURSE PRACTITIONER

## 2023-09-12 PROCEDURE — 99215 PR OFFICE/OUTPT VISIT, EST, LEVL V, 40-54 MIN: ICD-10-PCS | Mod: S$GLB,,, | Performed by: NURSE PRACTITIONER

## 2023-09-12 PROCEDURE — 1101F PR PT FALLS ASSESS DOC 0-1 FALLS W/OUT INJ PAST YR: ICD-10-PCS | Mod: CPTII,S$GLB,, | Performed by: NURSE PRACTITIONER

## 2023-09-12 PROCEDURE — 36415 COLL VENOUS BLD VENIPUNCTURE: CPT

## 2023-09-12 PROCEDURE — 85025 COMPLETE CBC W/AUTO DIFF WBC: CPT

## 2023-09-12 PROCEDURE — 80053 COMPREHEN METABOLIC PANEL: CPT

## 2023-09-12 PROCEDURE — 1160F PR REVIEW ALL MEDS BY PRESCRIBER/CLIN PHARMACIST DOCUMENTED: ICD-10-PCS | Mod: CPTII,S$GLB,, | Performed by: NURSE PRACTITIONER

## 2023-09-12 PROCEDURE — 99999 PR PBB SHADOW E&M-EST. PATIENT-LVL IV: CPT | Mod: PBBFAC,,, | Performed by: NURSE PRACTITIONER

## 2023-09-12 PROCEDURE — 1101F PT FALLS ASSESS-DOCD LE1/YR: CPT | Mod: CPTII,S$GLB,, | Performed by: NURSE PRACTITIONER

## 2023-09-12 PROCEDURE — 1126F AMNT PAIN NOTED NONE PRSNT: CPT | Mod: CPTII,S$GLB,, | Performed by: NURSE PRACTITIONER

## 2023-09-12 PROCEDURE — 3079F DIAST BP 80-89 MM HG: CPT | Mod: CPTII,S$GLB,, | Performed by: NURSE PRACTITIONER

## 2023-09-12 PROCEDURE — 99999 PR PBB SHADOW E&M-EST. PATIENT-LVL IV: ICD-10-PCS | Mod: PBBFAC,,, | Performed by: NURSE PRACTITIONER

## 2023-09-12 PROCEDURE — 3077F SYST BP >= 140 MM HG: CPT | Mod: CPTII,S$GLB,, | Performed by: NURSE PRACTITIONER

## 2023-09-12 PROCEDURE — 99215 OFFICE O/P EST HI 40 MIN: CPT | Mod: S$GLB,,, | Performed by: NURSE PRACTITIONER

## 2023-09-12 PROCEDURE — 3288F PR FALLS RISK ASSESSMENT DOCUMENTED: ICD-10-PCS | Mod: CPTII,S$GLB,, | Performed by: NURSE PRACTITIONER

## 2023-09-12 PROCEDURE — 3008F BODY MASS INDEX DOCD: CPT | Mod: CPTII,S$GLB,, | Performed by: NURSE PRACTITIONER

## 2023-09-12 PROCEDURE — 3077F PR MOST RECENT SYSTOLIC BLOOD PRESSURE >= 140 MM HG: ICD-10-PCS | Mod: CPTII,S$GLB,, | Performed by: NURSE PRACTITIONER

## 2023-09-12 PROCEDURE — 1126F PR PAIN SEVERITY QUANTIFIED, NO PAIN PRESENT: ICD-10-PCS | Mod: CPTII,S$GLB,, | Performed by: NURSE PRACTITIONER

## 2023-09-12 PROCEDURE — 1160F RVW MEDS BY RX/DR IN RCRD: CPT | Mod: CPTII,S$GLB,, | Performed by: NURSE PRACTITIONER

## 2023-09-12 PROCEDURE — 3008F PR BODY MASS INDEX (BMI) DOCUMENTED: ICD-10-PCS | Mod: CPTII,S$GLB,, | Performed by: NURSE PRACTITIONER

## 2023-09-12 PROCEDURE — 4010F ACE/ARB THERAPY RXD/TAKEN: CPT | Mod: CPTII,S$GLB,, | Performed by: NURSE PRACTITIONER

## 2023-09-12 RX ORDER — ANAGRELIDE 0.5 MG/1
0.5 CAPSULE ORAL DAILY
Qty: 30 CAPSULE | Refills: 5 | Status: SHIPPED | OUTPATIENT
Start: 2023-09-12 | End: 2024-02-16

## 2023-09-12 NOTE — PROGRESS NOTES
HEMATOLOGY/ONCOLOGY OFFICE CLINIC VISIT    Visit Information:  Visit type:  On-going care, Review/discussion of tests, Scheduled follow-up.    Accompanied by:  No one.    Source of history:  Self.    Referral source:  Roc GIFFORD, Isaac HUANG    History limitation:  None.        Initial Consultation: 3/28/2022  Referring Physician: Dr Brian  Other Physicians:  Code Status: Not addressed    Diagnosis/Problem list:   Essential Thrombocytosis  --CALR mutation analysis, exon 9: Positive.    Present Treatment:  Hydrea 500 mg Daily + Agrylin 1 mg daily (2mg on Sat/Sun only)  Change to Hydrea 500 mg BID + Agrylin 1 mg daily (2/9/23)    Treatment history:    Hydrea       Imaging:      Pathology:  March 21, 2022 15:21 Peripheral blood, CALR mutation analysis, exon 9: Positive. 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 Peripheral blood, JAK2 V617F mutation analysis: Negative for JAK2 V617F.    CLINICAL HISTORY:       Patient: 72 years old female kindly referred for thrombocytosis.     Reviewing electronic on medical record it seems like her platelets were normal up to March 2018.  In March 2019 platelets were mildly elevated 496,009 slowly there were trending up with the most recent on 3/15/2022 of 1,012,000.  She is taking baby aspirin.    March 21, 2022 15:21 Peripheral blood, CALR mutation analysis, exon 9: Positive. 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  Peripheral blood, JAK2 V617F mutation analysis: Negative for JAK2 V617F.    She denies any family history of blood disorders or malignancies.        Chief Complaint: OTHER (No concerns today.)    Here for ET    Interval History:  Patient presents today for 4 week follow up appointment.  She is taking Hydrea 500 mg BID and Agrylin 1 mg daily since 2/9/23. Platelet count is 464 but  WBC decreased to 3.53 and H/H decreased to 9.8/30.2. Reports increased fatigue. She denies any signs or symptoms of infection.  She denies any fever,  "chills, sweats.  No chest pain or shortness of breath.  No bleeding.   No headaches or changes in vision. No neurologic deficit.     Review of Systems   Constitutional:  Positive for malaise/fatigue.         Histories:  PMH/PSH/FH/SOCIAL/ALLERGIES AND MEDS REVIEWED AND UPDATED AS APPROPRIATE       Vitals:    09/12/23 1024   BP: (!) 148/83   BP Location: Left arm   Patient Position: Sitting   Pulse: 65   Resp: 18   Temp: 97.9 °F (36.6 °C)   TempSrc: Oral   SpO2: 100%   Weight: 80 kg (176 lb 4.8 oz)   Height: 5' 6" (1.676 m)        Physical Exam  Vitals and nursing note reviewed.   Constitutional:       General: She is not in acute distress.     Appearance: Normal appearance. She is well-developed.   HENT:      Head: Normocephalic and atraumatic.      Mouth/Throat:      Mouth: Mucous membranes are moist.   Eyes:      General: No scleral icterus.     Extraocular Movements: Extraocular movements intact.      Conjunctiva/sclera: Conjunctivae normal.      Pupils: Pupils are equal, round, and reactive to light.   Neck:      Vascular: No JVD.   Cardiovascular:      Rate and Rhythm: Normal rate and regular rhythm.      Heart sounds: No murmur heard.  Pulmonary:      Effort: Pulmonary effort is normal.      Breath sounds: Normal breath sounds. No wheezing or rhonchi.   Chest:      Chest wall: No deformity or tenderness.   Breasts:     Right: No swelling, mass, nipple discharge, skin change or tenderness.      Left: No swelling, mass, nipple discharge, skin change or tenderness.   Abdominal:      General: Bowel sounds are normal. There is no distension.      Palpations: Abdomen is soft. There is no splenomegaly or mass.      Tenderness: There is no abdominal tenderness.   Musculoskeletal:         General: No swelling or deformity.      Cervical back: Neck supple.      Right lower leg: Edema present.      Left lower leg: Edema present.   Lymphadenopathy:      Cervical: No cervical adenopathy.      Upper Body:      Right upper " body: No supraclavicular or axillary adenopathy.      Left upper body: No supraclavicular or axillary adenopathy.      Lower Body: No right inguinal adenopathy. No left inguinal adenopathy.   Skin:     General: Skin is warm.      Coloration: Skin is not jaundiced.      Findings: No lesion or rash.      Nails: There is no clubbing.   Neurological:      General: No focal deficit present.      Mental Status: She is alert and oriented to person, place, and time.      Sensory: Sensation is intact.      Motor: Motor function is intact.      Gait: Gait is intact.   Psychiatric:         Attention and Perception: Attention normal.         Mood and Affect: Mood and affect normal.         Speech: Speech normal.         Behavior: Behavior is cooperative.         Thought Content: Thought content normal.         Cognition and Memory: Cognition normal.         Judgment: Judgment normal.       ECOG SCORE             Laboratory:  CBC with Differential:  Lab Results   Component Value Date    WBC 3.53 (L) 09/12/2023    RBC 2.59 (L) 09/12/2023    HGB 9.8 (L) 09/12/2023    HCT 30.2 (L) 09/12/2023    .6 (H) 09/12/2023    MCH 37.8 (H) 09/12/2023    MCHC 32.5 (L) 09/12/2023    RDW 12.9 09/12/2023     (H) 09/12/2023    MPV 8.7 09/12/2023       Latest Reference Range & Units 05/23/22 08:40 06/09/22 07:37 06/23/22 11:27 07/07/22 07:27 07/21/22 11:18 08/04/22 08:10 08/18/22 10:52   WBC 4.5 - 11.5 x10(3)/mcL 2.6 (L) 3.3 (L) 4.0 (L) 3.1 (L) 3.4 (L) 3.5 (L) 4.1 (L)   (L): Data is abnormally low     Reference Range & Units 05/23/22 08:40 06/09/22 07:37 06/23/22 11:27 07/07/22 07:27 07/21/22 11:18 08/04/22 08:10 08/18/22 10:52   Platelets 130 - 400 x10(3)/mcL 449 (H) 557 (H) 612 (H) 623 (H) 646 (H) 673 (H) 590 (H)   (H): Data is abnormally high   Reference Range & Units 05/23/22 08:40 06/09/22 07:37 06/23/22 11:27 07/07/22 07:27 07/21/22 11:18 08/04/22 08:10 08/18/22 10:52   Hemoglobin 12.0 - 16.0 gm/dL 10.5 (L) 10.6 (L) 10.8 (L) 10.9  (L) 11.0 (L) 10.8 (L) 11.3 (L)   (L): Data is abnormally low    CMP:  Sodium Level   Date Value Ref Range Status   09/12/2023 144 136 - 145 mmol/L Final     Potassium Level   Date Value Ref Range Status   09/12/2023 4.9 3.5 - 5.1 mmol/L Final     Carbon Dioxide   Date Value Ref Range Status   09/12/2023 27 23 - 31 mmol/L Final     Blood Urea Nitrogen   Date Value Ref Range Status   09/12/2023 14.2 9.8 - 20.1 mg/dL Final     Creatinine   Date Value Ref Range Status   09/12/2023 0.64 0.55 - 1.02 mg/dL Final     Calcium Level Total   Date Value Ref Range Status   09/12/2023 9.1 8.4 - 10.2 mg/dL Final     Albumin Level   Date Value Ref Range Status   09/12/2023 4.1 3.4 - 4.8 g/dL Final     Bilirubin Total   Date Value Ref Range Status   09/12/2023 0.4 <=1.5 mg/dL Final     Alkaline Phosphatase   Date Value Ref Range Status   09/12/2023 68 40 - 150 unit/L Final     Aspartate Aminotransferase   Date Value Ref Range Status   09/12/2023 18 5 - 34 unit/L Final     Alanine Aminotransferase   Date Value Ref Range Status   09/12/2023 11 0 - 55 unit/L Final     Estimated GFR-Non    Date Value Ref Range Status   08/04/2022 >60 mls/min/1.73/m2 Final         Assessment:         1. Essential thrombocytosis        Essential thrombocytosis -- CALR mutation positive, 52 bp deletion Exon 9   --on Hydrea and Agrylin  Anemia-due to therapy  Leukopenia-due to therapy       Plan:       Thrombocytosis persists but improved although leukopenia and worsening anemia associated with Hydrea and Agrylin.         Contine Hydrea 500 mg BID but DECREASE Agrylin from 1 mg daily to 0.5 mg daily.           Cont ASA 81 mg daily     RTC 4 weeks with CBC, CMP same day    Encouraged to call for any questions or problems  The patient was given ample opportunity to ask questions and they were all answered to satisfaction; patient demonstrated understanding of what we discussed and is agreeable to the plan.    GORDON Collazo

## 2023-11-14 ENCOUNTER — OFFICE VISIT (OUTPATIENT)
Dept: HEMATOLOGY/ONCOLOGY | Facility: CLINIC | Age: 73
End: 2023-11-14
Payer: MEDICARE

## 2023-11-14 VITALS
BODY MASS INDEX: 28.21 KG/M2 | HEART RATE: 68 BPM | TEMPERATURE: 98 F | OXYGEN SATURATION: 99 % | RESPIRATION RATE: 18 BRPM | WEIGHT: 175.5 LBS | HEIGHT: 66 IN | SYSTOLIC BLOOD PRESSURE: 143 MMHG | DIASTOLIC BLOOD PRESSURE: 82 MMHG

## 2023-11-14 DIAGNOSIS — D47.3 ESSENTIAL THROMBOCYTOSIS: Primary | ICD-10-CM

## 2023-11-14 DIAGNOSIS — Z51.81 THERAPEUTIC DRUG MONITORING: ICD-10-CM

## 2023-11-14 PROCEDURE — 1100F PR PT FALLS ASSESS DOC 2+ FALLS/FALL W/INJURY/YR: ICD-10-PCS | Mod: CPTII,S$GLB,, | Performed by: NURSE PRACTITIONER

## 2023-11-14 PROCEDURE — 1160F RVW MEDS BY RX/DR IN RCRD: CPT | Mod: CPTII,S$GLB,, | Performed by: NURSE PRACTITIONER

## 2023-11-14 PROCEDURE — 3288F PR FALLS RISK ASSESSMENT DOCUMENTED: ICD-10-PCS | Mod: CPTII,S$GLB,, | Performed by: NURSE PRACTITIONER

## 2023-11-14 PROCEDURE — 1100F PTFALLS ASSESS-DOCD GE2>/YR: CPT | Mod: CPTII,S$GLB,, | Performed by: NURSE PRACTITIONER

## 2023-11-14 PROCEDURE — 3077F SYST BP >= 140 MM HG: CPT | Mod: CPTII,S$GLB,, | Performed by: NURSE PRACTITIONER

## 2023-11-14 PROCEDURE — 99999 PR PBB SHADOW E&M-EST. PATIENT-LVL IV: ICD-10-PCS | Mod: PBBFAC,,, | Performed by: NURSE PRACTITIONER

## 2023-11-14 PROCEDURE — 3079F PR MOST RECENT DIASTOLIC BLOOD PRESSURE 80-89 MM HG: ICD-10-PCS | Mod: CPTII,S$GLB,, | Performed by: NURSE PRACTITIONER

## 2023-11-14 PROCEDURE — 1126F AMNT PAIN NOTED NONE PRSNT: CPT | Mod: CPTII,S$GLB,, | Performed by: NURSE PRACTITIONER

## 2023-11-14 PROCEDURE — 4010F PR ACE/ARB THEARPY RXD/TAKEN: ICD-10-PCS | Mod: CPTII,S$GLB,, | Performed by: NURSE PRACTITIONER

## 2023-11-14 PROCEDURE — 99215 PR OFFICE/OUTPT VISIT, EST, LEVL V, 40-54 MIN: ICD-10-PCS | Mod: S$GLB,,, | Performed by: NURSE PRACTITIONER

## 2023-11-14 PROCEDURE — 1160F PR REVIEW ALL MEDS BY PRESCRIBER/CLIN PHARMACIST DOCUMENTED: ICD-10-PCS | Mod: CPTII,S$GLB,, | Performed by: NURSE PRACTITIONER

## 2023-11-14 PROCEDURE — 3079F DIAST BP 80-89 MM HG: CPT | Mod: CPTII,S$GLB,, | Performed by: NURSE PRACTITIONER

## 2023-11-14 PROCEDURE — 4010F ACE/ARB THERAPY RXD/TAKEN: CPT | Mod: CPTII,S$GLB,, | Performed by: NURSE PRACTITIONER

## 2023-11-14 PROCEDURE — 3077F PR MOST RECENT SYSTOLIC BLOOD PRESSURE >= 140 MM HG: ICD-10-PCS | Mod: CPTII,S$GLB,, | Performed by: NURSE PRACTITIONER

## 2023-11-14 PROCEDURE — 99215 OFFICE O/P EST HI 40 MIN: CPT | Mod: S$GLB,,, | Performed by: NURSE PRACTITIONER

## 2023-11-14 PROCEDURE — 3008F BODY MASS INDEX DOCD: CPT | Mod: CPTII,S$GLB,, | Performed by: NURSE PRACTITIONER

## 2023-11-14 PROCEDURE — 3288F FALL RISK ASSESSMENT DOCD: CPT | Mod: CPTII,S$GLB,, | Performed by: NURSE PRACTITIONER

## 2023-11-14 PROCEDURE — 3008F PR BODY MASS INDEX (BMI) DOCUMENTED: ICD-10-PCS | Mod: CPTII,S$GLB,, | Performed by: NURSE PRACTITIONER

## 2023-11-14 PROCEDURE — 1159F PR MEDICATION LIST DOCUMENTED IN MEDICAL RECORD: ICD-10-PCS | Mod: CPTII,S$GLB,, | Performed by: NURSE PRACTITIONER

## 2023-11-14 PROCEDURE — 1126F PR PAIN SEVERITY QUANTIFIED, NO PAIN PRESENT: ICD-10-PCS | Mod: CPTII,S$GLB,, | Performed by: NURSE PRACTITIONER

## 2023-11-14 PROCEDURE — 99999 PR PBB SHADOW E&M-EST. PATIENT-LVL IV: CPT | Mod: PBBFAC,,, | Performed by: NURSE PRACTITIONER

## 2023-11-14 PROCEDURE — 1159F MED LIST DOCD IN RCRD: CPT | Mod: CPTII,S$GLB,, | Performed by: NURSE PRACTITIONER

## 2023-11-14 NOTE — PROGRESS NOTES
HEMATOLOGY/ONCOLOGY OFFICE CLINIC VISIT    Visit Information:  Visit type:  On-going care, Review/discussion of tests, Scheduled follow-up.    Accompanied by:  No one.    Source of history:  Self.    Referral source:  Roc GIFFORD, Isaac HUANG    History limitation:  None.        Initial Consultation: 3/28/2022  Referring Physician: Dr Brian  Other Physicians:  Code Status: Not addressed    Diagnosis/Problem list:   Essential Thrombocytosis  --CALR mutation analysis, exon 9: Positive.    Present Treatment:  Hydrea 500 mg Daily + Agrylin 1 mg daily (2mg on Sat/Sun only)  Change to Hydrea 500 mg BID + Agrylin 1 mg daily (2/9/23) changed to 0.5 mg daily on 9/12/23    Treatment history:    Hydrea       Imaging:      Pathology:  March 21, 2022 15:21 Peripheral blood, CALR mutation analysis, exon 9: Positive. 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 Peripheral blood, JAK2 V617F mutation analysis: Negative for JAK2 V617F.    CLINICAL HISTORY:       Patient: 72 years old female kindly referred for thrombocytosis.     Reviewing electronic on medical record it seems like her platelets were normal up to March 2018.  In March 2019 platelets were mildly elevated 496,009 slowly there were trending up with the most recent on 3/15/2022 of 1,012,000.  She is taking baby aspirin.    March 21, 2022 15:21 Peripheral blood, CALR mutation analysis, exon 9: Positive. 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  Peripheral blood, JAK2 V617F mutation analysis: Negative for JAK2 V617F.    She denies any family history of blood disorders or malignancies.        Chief Complaint: no complaints today    Here for ET    Interval History:  Patient presents today for 4 week follow up appointment.  She was taking Hydrea 500 mg BID and Agrylin 1 mg daily since 2/9/23. At her last appt, we decreased Agrylin to 0.5 mg daily d/t anemia. Anemia did improve some. WBC is still decreased but stable.  Reports increased fatigue.  "She is 73 years old and she is raising grandchildren who are now teenagers.  She denies any signs or symptoms of infection.  She denies any fever, chills, sweats.  No chest pain or shortness of breath.  No bleeding.   No headaches or changes in vision. No neurologic deficit.     Review of Systems   Constitutional:  Positive for malaise/fatigue.         Histories:  PMH/PSH/FH/SOCIAL/ALLERGIES AND MEDS REVIEWED AND UPDATED AS APPROPRIATE       Vitals:    11/14/23 0851   BP: (!) 143/82   BP Location: Left arm   Patient Position: Sitting   BP Method: Medium (Automatic)   Pulse: 68   Resp: 18   Temp: 97.7 °F (36.5 °C)   TempSrc: Oral   SpO2: 99%   Weight: 79.6 kg (175 lb 8 oz)   Height: 5' 6" (1.676 m)        Physical Exam  Vitals and nursing note reviewed.   Constitutional:       General: She is not in acute distress.     Appearance: Normal appearance. She is well-developed.   HENT:      Head: Normocephalic and atraumatic.      Mouth/Throat:      Mouth: Mucous membranes are moist.   Eyes:      General: No scleral icterus.     Extraocular Movements: Extraocular movements intact.      Conjunctiva/sclera: Conjunctivae normal.      Pupils: Pupils are equal, round, and reactive to light.   Neck:      Vascular: No JVD.   Cardiovascular:      Rate and Rhythm: Normal rate and regular rhythm.      Heart sounds: No murmur heard.  Pulmonary:      Effort: Pulmonary effort is normal.      Breath sounds: Normal breath sounds. No wheezing or rhonchi.   Chest:      Chest wall: No deformity or tenderness.   Breasts:     Right: No swelling, mass, nipple discharge, skin change or tenderness.      Left: No swelling, mass, nipple discharge, skin change or tenderness.   Abdominal:      General: Bowel sounds are normal. There is no distension.      Palpations: Abdomen is soft. There is no splenomegaly or mass.      Tenderness: There is no abdominal tenderness.   Musculoskeletal:         General: No swelling or deformity.      Cervical back: " Neck supple.      Right lower leg: Edema present.      Left lower leg: Edema present.   Lymphadenopathy:      Cervical: No cervical adenopathy.      Upper Body:      Right upper body: No supraclavicular or axillary adenopathy.      Left upper body: No supraclavicular or axillary adenopathy.      Lower Body: No right inguinal adenopathy. No left inguinal adenopathy.   Skin:     General: Skin is warm.      Coloration: Skin is not jaundiced.      Findings: No lesion or rash.      Nails: There is no clubbing.   Neurological:      General: No focal deficit present.      Mental Status: She is alert and oriented to person, place, and time.      Sensory: Sensation is intact.      Motor: Motor function is intact.      Gait: Gait is intact.   Psychiatric:         Attention and Perception: Attention normal.         Mood and Affect: Mood and affect normal.         Speech: Speech normal.         Behavior: Behavior is cooperative.         Thought Content: Thought content normal.         Cognition and Memory: Cognition normal.         Judgment: Judgment normal.       ECOG SCORE             Laboratory:  CBC with Differential:  Lab Results   Component Value Date    WBC 2.81 (L) 11/14/2023    RBC 2.86 (L) 11/14/2023    HGB 10.9 (L) 11/14/2023    HCT 33.6 (L) 11/14/2023    .5 (H) 11/14/2023    MCH 38.1 (H) 11/14/2023    MCHC 32.4 (L) 11/14/2023    RDW 13.1 11/14/2023     (H) 11/14/2023    MPV 8.8 11/14/2023       Latest Reference Range & Units 05/23/22 08:40 06/09/22 07:37 06/23/22 11:27 07/07/22 07:27 07/21/22 11:18 08/04/22 08:10 08/18/22 10:52   WBC 4.5 - 11.5 x10(3)/mcL 2.6 (L) 3.3 (L) 4.0 (L) 3.1 (L) 3.4 (L) 3.5 (L) 4.1 (L)   (L): Data is abnormally low     Reference Range & Units 05/23/22 08:40 06/09/22 07:37 06/23/22 11:27 07/07/22 07:27 07/21/22 11:18 08/04/22 08:10 08/18/22 10:52   Platelets 130 - 400 x10(3)/mcL 449 (H) 557 (H) 612 (H) 623 (H) 646 (H) 673 (H) 590 (H)   (H): Data is abnormally high   Reference  Range & Units 05/23/22 08:40 06/09/22 07:37 06/23/22 11:27 07/07/22 07:27 07/21/22 11:18 08/04/22 08:10 08/18/22 10:52   Hemoglobin 12.0 - 16.0 gm/dL 10.5 (L) 10.6 (L) 10.8 (L) 10.9 (L) 11.0 (L) 10.8 (L) 11.3 (L)   (L): Data is abnormally low    CMP:  Sodium Level   Date Value Ref Range Status   11/14/2023 141 136 - 145 mmol/L Final     Potassium Level   Date Value Ref Range Status   11/14/2023 4.5 3.5 - 5.1 mmol/L Final     Carbon Dioxide   Date Value Ref Range Status   11/14/2023 29 23 - 31 mmol/L Final     Blood Urea Nitrogen   Date Value Ref Range Status   11/14/2023 16.7 9.8 - 20.1 mg/dL Final     Creatinine   Date Value Ref Range Status   11/14/2023 0.67 0.55 - 1.02 mg/dL Final     Calcium Level Total   Date Value Ref Range Status   11/14/2023 9.5 8.4 - 10.2 mg/dL Final     Albumin Level   Date Value Ref Range Status   11/14/2023 4.3 3.4 - 4.8 g/dL Final     Bilirubin Total   Date Value Ref Range Status   11/14/2023 0.5 <=1.5 mg/dL Final     Alkaline Phosphatase   Date Value Ref Range Status   11/14/2023 75 40 - 150 unit/L Final     Aspartate Aminotransferase   Date Value Ref Range Status   11/14/2023 19 5 - 34 unit/L Final     Alanine Aminotransferase   Date Value Ref Range Status   11/14/2023 11 0 - 55 unit/L Final     Estimated GFR-Non    Date Value Ref Range Status   08/04/2022 >60 mls/min/1.73/m2 Final         Assessment:         1. Essential thrombocytosis    2. Therapeutic drug monitoring      Essential thrombocytosis -- CALR mutation positive, 52 bp deletion Exon 9   --on Hydrea and Agrylin  Anemia-due to therapy  Leukopenia-due to therapy       Plan:           Contine Hydrea 500 mg BID and Agrylin 0.5 mg daily.   ( This was decreased 4 weeks ago. Anemia improved and platelets are still <500.         Cont ASA 81 mg daily     RTC 4 weeks with CBC, CMP same day    Encouraged to call for any questions or problems  The patient was given ample opportunity to ask questions and they were all  answered to satisfaction; patient demonstrated understanding of what we discussed and is agreeable to the plan.    GORDON Collazo

## 2023-12-12 ENCOUNTER — OFFICE VISIT (OUTPATIENT)
Dept: HEMATOLOGY/ONCOLOGY | Facility: CLINIC | Age: 73
End: 2023-12-12
Payer: MEDICARE

## 2023-12-12 VITALS
DIASTOLIC BLOOD PRESSURE: 87 MMHG | HEIGHT: 66 IN | OXYGEN SATURATION: 99 % | HEART RATE: 71 BPM | RESPIRATION RATE: 17 BRPM | WEIGHT: 176.13 LBS | BODY MASS INDEX: 28.31 KG/M2 | SYSTOLIC BLOOD PRESSURE: 158 MMHG | TEMPERATURE: 97 F

## 2023-12-12 DIAGNOSIS — D47.3 ESSENTIAL THROMBOCYTOSIS: Primary | ICD-10-CM

## 2023-12-12 DIAGNOSIS — Z51.81 THERAPEUTIC DRUG MONITORING: ICD-10-CM

## 2023-12-12 PROCEDURE — 1100F PTFALLS ASSESS-DOCD GE2>/YR: CPT | Mod: CPTII,S$GLB,, | Performed by: NURSE PRACTITIONER

## 2023-12-12 PROCEDURE — 3077F SYST BP >= 140 MM HG: CPT | Mod: CPTII,S$GLB,, | Performed by: NURSE PRACTITIONER

## 2023-12-12 PROCEDURE — 99215 OFFICE O/P EST HI 40 MIN: CPT | Mod: S$GLB,,, | Performed by: NURSE PRACTITIONER

## 2023-12-12 PROCEDURE — 1159F MED LIST DOCD IN RCRD: CPT | Mod: CPTII,S$GLB,, | Performed by: NURSE PRACTITIONER

## 2023-12-12 PROCEDURE — 1160F RVW MEDS BY RX/DR IN RCRD: CPT | Mod: CPTII,S$GLB,, | Performed by: NURSE PRACTITIONER

## 2023-12-12 PROCEDURE — 99999 PR PBB SHADOW E&M-EST. PATIENT-LVL IV: CPT | Mod: PBBFAC,,, | Performed by: NURSE PRACTITIONER

## 2023-12-12 PROCEDURE — 3077F PR MOST RECENT SYSTOLIC BLOOD PRESSURE >= 140 MM HG: ICD-10-PCS | Mod: CPTII,S$GLB,, | Performed by: NURSE PRACTITIONER

## 2023-12-12 PROCEDURE — 1159F PR MEDICATION LIST DOCUMENTED IN MEDICAL RECORD: ICD-10-PCS | Mod: CPTII,S$GLB,, | Performed by: NURSE PRACTITIONER

## 2023-12-12 PROCEDURE — 1126F PR PAIN SEVERITY QUANTIFIED, NO PAIN PRESENT: ICD-10-PCS | Mod: CPTII,S$GLB,, | Performed by: NURSE PRACTITIONER

## 2023-12-12 PROCEDURE — 3079F DIAST BP 80-89 MM HG: CPT | Mod: CPTII,S$GLB,, | Performed by: NURSE PRACTITIONER

## 2023-12-12 PROCEDURE — 3008F BODY MASS INDEX DOCD: CPT | Mod: CPTII,S$GLB,, | Performed by: NURSE PRACTITIONER

## 2023-12-12 PROCEDURE — 3008F PR BODY MASS INDEX (BMI) DOCUMENTED: ICD-10-PCS | Mod: CPTII,S$GLB,, | Performed by: NURSE PRACTITIONER

## 2023-12-12 PROCEDURE — 3079F PR MOST RECENT DIASTOLIC BLOOD PRESSURE 80-89 MM HG: ICD-10-PCS | Mod: CPTII,S$GLB,, | Performed by: NURSE PRACTITIONER

## 2023-12-12 PROCEDURE — 99215 PR OFFICE/OUTPT VISIT, EST, LEVL V, 40-54 MIN: ICD-10-PCS | Mod: S$GLB,,, | Performed by: NURSE PRACTITIONER

## 2023-12-12 PROCEDURE — 4010F PR ACE/ARB THEARPY RXD/TAKEN: ICD-10-PCS | Mod: CPTII,S$GLB,, | Performed by: NURSE PRACTITIONER

## 2023-12-12 PROCEDURE — 1100F PR PT FALLS ASSESS DOC 2+ FALLS/FALL W/INJURY/YR: ICD-10-PCS | Mod: CPTII,S$GLB,, | Performed by: NURSE PRACTITIONER

## 2023-12-12 PROCEDURE — 99999 PR PBB SHADOW E&M-EST. PATIENT-LVL IV: ICD-10-PCS | Mod: PBBFAC,,, | Performed by: NURSE PRACTITIONER

## 2023-12-12 PROCEDURE — 4010F ACE/ARB THERAPY RXD/TAKEN: CPT | Mod: CPTII,S$GLB,, | Performed by: NURSE PRACTITIONER

## 2023-12-12 PROCEDURE — 1160F PR REVIEW ALL MEDS BY PRESCRIBER/CLIN PHARMACIST DOCUMENTED: ICD-10-PCS | Mod: CPTII,S$GLB,, | Performed by: NURSE PRACTITIONER

## 2023-12-12 PROCEDURE — 3288F PR FALLS RISK ASSESSMENT DOCUMENTED: ICD-10-PCS | Mod: CPTII,S$GLB,, | Performed by: NURSE PRACTITIONER

## 2023-12-12 PROCEDURE — 3288F FALL RISK ASSESSMENT DOCD: CPT | Mod: CPTII,S$GLB,, | Performed by: NURSE PRACTITIONER

## 2023-12-12 PROCEDURE — 1126F AMNT PAIN NOTED NONE PRSNT: CPT | Mod: CPTII,S$GLB,, | Performed by: NURSE PRACTITIONER

## 2023-12-12 NOTE — PROGRESS NOTES
HEMATOLOGY/ONCOLOGY OFFICE CLINIC VISIT    Visit Information:  Visit type:  On-going care, Review/discussion of tests, Scheduled follow-up.    Accompanied by:  No one.    Source of history:  Self.    Referral source:  Roc GIFFORD, Isaac HUANG    History limitation:  None.        Initial Consultation: 3/28/2022  Referring Physician: Dr Brian  Other Physicians:  Code Status: Not addressed    Diagnosis/Problem list:   Essential Thrombocytosis  --CALR mutation analysis, exon 9: Positive.    Present Treatment:  Hydrea 500 mg Daily + Agrylin 1 mg daily (2mg on Sat/Sun only)  Change to Hydrea 500 mg BID + Agrylin 1 mg daily (2/9/23) changed to 0.5 mg daily on 9/12/23    Treatment history:    Hydrea       Imaging:      Pathology:  March 21, 2022 15:21 Peripheral blood, CALR mutation analysis, exon 9: Positive. 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 Peripheral blood, JAK2 V617F mutation analysis: Negative for JAK2 V617F.    CLINICAL HISTORY:       Patient: 72 years old female kindly referred for thrombocytosis.     Reviewing electronic on medical record it seems like her platelets were normal up to March 2018.  In March 2019 platelets were mildly elevated 496,009 slowly there were trending up with the most recent on 3/15/2022 of 1,012,000.  She is taking baby aspirin.    March 21, 2022 15:21 Peripheral blood, CALR mutation analysis, exon 9: Positive. 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  Peripheral blood, JAK2 V617F mutation analysis: Negative for JAK2 V617F.    She denies any family history of blood disorders or malignancies.        Chief Complaint: Follow-up (No concerns today.)    Here for ET    Interval History:  Patient presents today for 4 week follow up appointment.  She was taking Hydrea 500 mg BID and Agrylin 1 mg daily since 2/9/23. In 9/2023, we decreased Agrylin to 0.5 mg daily d/t anemia. Anemia did improve some. Platelet count remains <600k. WBC is still decreased but  "stable.  She denies any signs or symptoms of infection.  She denies any fever, chills, sweats.  No chest pain or shortness of breath.  No bleeding.   No headaches or changes in vision. No neurologic deficit.     Review of Systems   Constitutional:  Positive for malaise/fatigue.         Histories:  PMH/PSH/FH/SOCIAL/ALLERGIES AND MEDS REVIEWED AND UPDATED AS APPROPRIATE       Vitals:    12/12/23 0924   BP: (!) 158/87   BP Location: Left arm   Patient Position: Sitting   Pulse: 71   Resp: 17   Temp: 97.4 °F (36.3 °C)   TempSrc: Oral   SpO2: 99%   Weight: 79.9 kg (176 lb 1.6 oz)   Height: 5' 6" (1.676 m)        Physical Exam  Vitals and nursing note reviewed.   Constitutional:       General: She is not in acute distress.     Appearance: Normal appearance. She is well-developed.   HENT:      Head: Normocephalic and atraumatic.      Mouth/Throat:      Mouth: Mucous membranes are moist.   Eyes:      General: No scleral icterus.     Extraocular Movements: Extraocular movements intact.      Conjunctiva/sclera: Conjunctivae normal.      Pupils: Pupils are equal, round, and reactive to light.   Neck:      Vascular: No JVD.   Cardiovascular:      Rate and Rhythm: Normal rate and regular rhythm.      Heart sounds: No murmur heard.  Pulmonary:      Effort: Pulmonary effort is normal.      Breath sounds: Normal breath sounds. No wheezing or rhonchi.   Chest:      Chest wall: No deformity or tenderness.   Breasts:     Right: No swelling, mass, nipple discharge, skin change or tenderness.      Left: No swelling, mass, nipple discharge, skin change or tenderness.   Abdominal:      General: Bowel sounds are normal. There is no distension.      Palpations: Abdomen is soft. There is no splenomegaly or mass.      Tenderness: There is no abdominal tenderness.   Musculoskeletal:         General: No swelling or deformity.      Cervical back: Neck supple.      Right lower leg: Edema present.      Left lower leg: Edema present. "   Lymphadenopathy:      Cervical: No cervical adenopathy.      Upper Body:      Right upper body: No supraclavicular or axillary adenopathy.      Left upper body: No supraclavicular or axillary adenopathy.      Lower Body: No right inguinal adenopathy. No left inguinal adenopathy.   Skin:     General: Skin is warm.      Coloration: Skin is not jaundiced.      Findings: No lesion or rash.      Nails: There is no clubbing.   Neurological:      General: No focal deficit present.      Mental Status: She is alert and oriented to person, place, and time.      Sensory: Sensation is intact.      Motor: Motor function is intact.      Gait: Gait is intact.   Psychiatric:         Attention and Perception: Attention normal.         Mood and Affect: Mood and affect normal.         Speech: Speech normal.         Behavior: Behavior is cooperative.         Thought Content: Thought content normal.         Cognition and Memory: Cognition normal.         Judgment: Judgment normal.       ECOG SCORE             Laboratory:  CBC with Differential:  Lab Results   Component Value Date    WBC 3.39 (L) 12/12/2023    RBC 2.74 (L) 12/12/2023    HGB 10.7 (L) 12/12/2023    HCT 32.0 (L) 12/12/2023    .8 (H) 12/12/2023    MCH 39.1 (H) 12/12/2023    MCHC 33.4 12/12/2023    RDW 13.0 12/12/2023     (H) 12/12/2023    MPV 8.7 12/12/2023       Latest Reference Range & Units 05/23/22 08:40 06/09/22 07:37 06/23/22 11:27 07/07/22 07:27 07/21/22 11:18 08/04/22 08:10 08/18/22 10:52   WBC 4.5 - 11.5 x10(3)/mcL 2.6 (L) 3.3 (L) 4.0 (L) 3.1 (L) 3.4 (L) 3.5 (L) 4.1 (L)   (L): Data is abnormally low     Reference Range & Units 05/23/22 08:40 06/09/22 07:37 06/23/22 11:27 07/07/22 07:27 07/21/22 11:18 08/04/22 08:10 08/18/22 10:52   Platelets 130 - 400 x10(3)/mcL 449 (H) 557 (H) 612 (H) 623 (H) 646 (H) 673 (H) 590 (H)   (H): Data is abnormally high   Reference Range & Units 05/23/22 08:40 06/09/22 07:37 06/23/22 11:27 07/07/22 07:27 07/21/22 11:18  08/04/22 08:10 08/18/22 10:52   Hemoglobin 12.0 - 16.0 gm/dL 10.5 (L) 10.6 (L) 10.8 (L) 10.9 (L) 11.0 (L) 10.8 (L) 11.3 (L)   (L): Data is abnormally low    CMP:  Sodium Level   Date Value Ref Range Status   12/12/2023 142 136 - 145 mmol/L Final     Potassium Level   Date Value Ref Range Status   12/12/2023 5.2 (H) 3.5 - 5.1 mmol/L Final     Carbon Dioxide   Date Value Ref Range Status   12/12/2023 30 23 - 31 mmol/L Final     Blood Urea Nitrogen   Date Value Ref Range Status   12/12/2023 17.2 9.8 - 20.1 mg/dL Final     Creatinine   Date Value Ref Range Status   12/12/2023 0.68 0.55 - 1.02 mg/dL Final     Calcium Level Total   Date Value Ref Range Status   12/12/2023 9.4 8.4 - 10.2 mg/dL Final     Albumin Level   Date Value Ref Range Status   12/12/2023 4.1 3.4 - 4.8 g/dL Final     Bilirubin Total   Date Value Ref Range Status   12/12/2023 0.4 <=1.5 mg/dL Final     Alkaline Phosphatase   Date Value Ref Range Status   12/12/2023 72 40 - 150 unit/L Final     Aspartate Aminotransferase   Date Value Ref Range Status   12/12/2023 21 5 - 34 unit/L Final     Alanine Aminotransferase   Date Value Ref Range Status   12/12/2023 14 0 - 55 unit/L Final     Estimated GFR-Non    Date Value Ref Range Status   08/04/2022 >60 mls/min/1.73/m2 Final         Assessment:         1. Essential thrombocytosis    2. Therapeutic drug monitoring        Essential thrombocytosis -- CALR mutation positive, 52 bp deletion Exon 9   --on Hydrea and Agrylin  Anemia-due to therapy  Leukopenia-due to therapy       Plan:           Contine Hydrea 500 mg BID and Agrylin 0.5 mg daily.   ( This was decreased 12 weeks ago)  Anemia improved and platelets are still <600.         Cont ASA 81 mg daily     RTC 6 weeks with CBC, CMP same day    Encouraged to call for any questions or problems  The patient was given ample opportunity to ask questions and they were all answered to satisfaction; patient demonstrated understanding of what we discussed  and is agreeable to the plan.    GORDON Collazo

## 2023-12-28 DIAGNOSIS — D47.3 ESSENTIAL THROMBOCYTOSIS: ICD-10-CM

## 2023-12-29 RX ORDER — HYDROXYUREA 500 MG/1
500 CAPSULE ORAL 2 TIMES DAILY
Qty: 60 CAPSULE | Refills: 3 | Status: SHIPPED | OUTPATIENT
Start: 2023-12-29

## 2024-01-18 PROBLEM — Z00.00 WELLNESS EXAMINATION: Status: ACTIVE | Noted: 2024-01-18

## 2024-01-23 ENCOUNTER — OFFICE VISIT (OUTPATIENT)
Dept: HEMATOLOGY/ONCOLOGY | Facility: CLINIC | Age: 74
End: 2024-01-23
Payer: MEDICARE

## 2024-01-23 VITALS
DIASTOLIC BLOOD PRESSURE: 80 MMHG | RESPIRATION RATE: 18 BRPM | HEART RATE: 78 BPM | HEIGHT: 66 IN | TEMPERATURE: 98 F | BODY MASS INDEX: 27.93 KG/M2 | SYSTOLIC BLOOD PRESSURE: 144 MMHG | OXYGEN SATURATION: 99 % | WEIGHT: 173.81 LBS

## 2024-01-23 DIAGNOSIS — D47.3 ESSENTIAL THROMBOCYTOSIS: Primary | ICD-10-CM

## 2024-01-23 DIAGNOSIS — Z51.81 THERAPEUTIC DRUG MONITORING: ICD-10-CM

## 2024-01-23 PROCEDURE — 3077F SYST BP >= 140 MM HG: CPT | Mod: CPTII,S$GLB,, | Performed by: NURSE PRACTITIONER

## 2024-01-23 PROCEDURE — 99214 OFFICE O/P EST MOD 30 MIN: CPT | Mod: S$GLB,,, | Performed by: NURSE PRACTITIONER

## 2024-01-23 PROCEDURE — 3008F BODY MASS INDEX DOCD: CPT | Mod: CPTII,S$GLB,, | Performed by: NURSE PRACTITIONER

## 2024-01-23 PROCEDURE — 3288F FALL RISK ASSESSMENT DOCD: CPT | Mod: CPTII,S$GLB,, | Performed by: NURSE PRACTITIONER

## 2024-01-23 PROCEDURE — 1126F AMNT PAIN NOTED NONE PRSNT: CPT | Mod: CPTII,S$GLB,, | Performed by: NURSE PRACTITIONER

## 2024-01-23 PROCEDURE — 3079F DIAST BP 80-89 MM HG: CPT | Mod: CPTII,S$GLB,, | Performed by: NURSE PRACTITIONER

## 2024-01-23 PROCEDURE — 99999 PR PBB SHADOW E&M-EST. PATIENT-LVL IV: CPT | Mod: PBBFAC,,, | Performed by: NURSE PRACTITIONER

## 2024-01-23 PROCEDURE — 1159F MED LIST DOCD IN RCRD: CPT | Mod: CPTII,S$GLB,, | Performed by: NURSE PRACTITIONER

## 2024-01-23 PROCEDURE — 4010F ACE/ARB THERAPY RXD/TAKEN: CPT | Mod: CPTII,S$GLB,, | Performed by: NURSE PRACTITIONER

## 2024-01-23 PROCEDURE — 1101F PT FALLS ASSESS-DOCD LE1/YR: CPT | Mod: CPTII,S$GLB,, | Performed by: NURSE PRACTITIONER

## 2024-01-23 NOTE — PROGRESS NOTES
HEMATOLOGY/ONCOLOGY OFFICE CLINIC VISIT    Visit Information:  Visit type:  On-going care, Review/discussion of tests, Scheduled follow-up.    Accompanied by:  No one.    Source of history:  Self.    Referral source:  Roc GIFFORD, Isaac HUANG    History limitation:  None.        Initial Consultation: 3/28/2022  Referring Physician: Dr Brian  Other Physicians:  Code Status: Not addressed    Diagnosis/Problem list:   Essential Thrombocytosis  --CALR mutation analysis, exon 9: Positive.    Present Treatment:  Hydrea 500 mg BID + Agrylin 0.5 mg daily on 9/12/23    Treatment history:    Hydrea 500 mg Daily + Agrylin 1 mg daily (2mg on Sat/Sun only)  Hydrea 500 mg BID + Agrylin 1 mg daily (2/9/23)    Imaging:      Pathology:  March 21, 2022 15:21 Peripheral blood, CALR mutation analysis, exon 9: Positive. 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 Peripheral blood, JAK2 V617F mutation analysis: Negative for JAK2 V617F.    CLINICAL HISTORY:       Patient: 72 years old female kindly referred for thrombocytosis.     Reviewing electronic on medical record it seems like her platelets were normal up to March 2018.  In March 2019 platelets were mildly elevated 496,009 slowly there were trending up with the most recent on 3/15/2022 of 1,012,000.  She is taking baby aspirin.    March 21, 2022 15:21 Peripheral blood, CALR mutation analysis, exon 9: Positive. 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  Peripheral blood, JAK2 V617F mutation analysis: Negative for JAK2 V617F.    She denies any family history of blood disorders or malignancies.        Chief Complaint: 6 week follow up    Here for ET    Interval History:  Patient presents today for 6 week follow up appointment.  She was taking Hydrea 500 mg BID and Agrylin 1 mg daily since 2/9/23. In 9/2023, we decreased Agrylin to 0.5 mg daily d/t anemia. Anemia improved as did her platelet count. WBC is still decreased but stable.  She denies any signs  "or symptoms of infection.  She denies any fever, chills, sweats.  No chest pain or shortness of breath.  No bleeding.   No headaches or changes in vision. No neurologic deficit. Reports increased energy since dose reduction.     Review of Systems   Constitutional:  Positive for malaise/fatigue.         Histories:  PMH/PSH/FH/SOCIAL/ALLERGIES AND MEDS REVIEWED AND UPDATED AS APPROPRIATE       Vitals:    01/23/24 0846   BP: (!) 144/80   BP Location: Left arm   Patient Position: Sitting   BP Method: Medium (Automatic)   Pulse: 78   Resp: 18   Temp: 98.2 °F (36.8 °C)   TempSrc: Oral   SpO2: 99%   Weight: 78.8 kg (173 lb 12.8 oz)   Height: 5' 6" (1.676 m)          Physical Exam  Vitals and nursing note reviewed.   Constitutional:       General: She is not in acute distress.     Appearance: Normal appearance. She is well-developed.   HENT:      Head: Normocephalic and atraumatic.      Mouth/Throat:      Mouth: Mucous membranes are moist.   Eyes:      General: No scleral icterus.     Extraocular Movements: Extraocular movements intact.      Conjunctiva/sclera: Conjunctivae normal.      Pupils: Pupils are equal, round, and reactive to light.   Neck:      Vascular: No JVD.   Cardiovascular:      Rate and Rhythm: Normal rate and regular rhythm.      Heart sounds: No murmur heard.  Pulmonary:      Effort: Pulmonary effort is normal.      Breath sounds: Normal breath sounds. No wheezing or rhonchi.   Chest:      Chest wall: No deformity or tenderness.   Breasts:     Right: No swelling, mass, nipple discharge, skin change or tenderness.      Left: No swelling, mass, nipple discharge, skin change or tenderness.   Abdominal:      General: Bowel sounds are normal. There is no distension.      Palpations: Abdomen is soft. There is no splenomegaly or mass.      Tenderness: There is no abdominal tenderness.   Musculoskeletal:         General: No swelling or deformity.      Cervical back: Neck supple.      Right lower leg: Edema " present.      Left lower leg: Edema present.   Lymphadenopathy:      Cervical: No cervical adenopathy.      Upper Body:      Right upper body: No supraclavicular or axillary adenopathy.      Left upper body: No supraclavicular or axillary adenopathy.      Lower Body: No right inguinal adenopathy. No left inguinal adenopathy.   Skin:     General: Skin is warm.      Coloration: Skin is not jaundiced.      Findings: No lesion or rash.      Nails: There is no clubbing.   Neurological:      General: No focal deficit present.      Mental Status: She is alert and oriented to person, place, and time.      Sensory: Sensation is intact.      Motor: Motor function is intact.      Gait: Gait is intact.   Psychiatric:         Attention and Perception: Attention normal.         Mood and Affect: Mood and affect normal.         Speech: Speech normal.         Behavior: Behavior is cooperative.         Thought Content: Thought content normal.         Cognition and Memory: Cognition normal.         Judgment: Judgment normal.       ECOG SCORE             Laboratory:  CBC with Differential:  Lab Results   Component Value Date    WBC 3.09 (L) 01/23/2024    RBC 2.84 (L) 01/23/2024    HGB 11.0 (L) 01/23/2024    HCT 32.7 (L) 01/23/2024    .1 (H) 01/23/2024    MCH 38.7 (H) 01/23/2024    MCHC 33.6 01/23/2024    RDW 12.5 01/23/2024     (H) 01/23/2024    MPV 8.7 01/23/2024       Latest Reference Range & Units 05/23/22 08:40 06/09/22 07:37 06/23/22 11:27 07/07/22 07:27 07/21/22 11:18 08/04/22 08:10 08/18/22 10:52   WBC 4.5 - 11.5 x10(3)/mcL 2.6 (L) 3.3 (L) 4.0 (L) 3.1 (L) 3.4 (L) 3.5 (L) 4.1 (L)   (L): Data is abnormally low     Reference Range & Units 05/23/22 08:40 06/09/22 07:37 06/23/22 11:27 07/07/22 07:27 07/21/22 11:18 08/04/22 08:10 08/18/22 10:52   Platelets 130 - 400 x10(3)/mcL 449 (H) 557 (H) 612 (H) 623 (H) 646 (H) 673 (H) 590 (H)   (H): Data is abnormally high   Reference Range & Units 05/23/22 08:40 06/09/22 07:37  06/23/22 11:27 07/07/22 07:27 07/21/22 11:18 08/04/22 08:10 08/18/22 10:52   Hemoglobin 12.0 - 16.0 gm/dL 10.5 (L) 10.6 (L) 10.8 (L) 10.9 (L) 11.0 (L) 10.8 (L) 11.3 (L)   (L): Data is abnormally low    CMP:  Sodium Level   Date Value Ref Range Status   01/19/2024 141 136 - 145 mmol/L Final     Potassium Level   Date Value Ref Range Status   01/19/2024 4.2 3.5 - 5.1 mmol/L Final     Carbon Dioxide   Date Value Ref Range Status   01/19/2024 29 21 - 32 mmol/L Final     Blood Urea Nitrogen   Date Value Ref Range Status   01/19/2024 15.0 7.0 - 18.0 mg/dL Final     Creatinine   Date Value Ref Range Status   01/19/2024 0.61 0.60 - 1.30 mg/dL Final     Calcium Level Total   Date Value Ref Range Status   01/19/2024 9.4 8.5 - 10.1 mg/dL Final     Albumin Level   Date Value Ref Range Status   01/19/2024 4.2 3.4 - 5.0 g/dL Final     Bilirubin Total   Date Value Ref Range Status   01/19/2024 0.5 0.2 - 1.0 mg/dL Final     Alkaline Phosphatase   Date Value Ref Range Status   01/19/2024 61 38 - 126 unit/L Final     Aspartate Aminotransferase   Date Value Ref Range Status   01/19/2024 16 15 - 37 unit/L Final     Alanine Aminotransferase   Date Value Ref Range Status   01/19/2024 10 7 - 52 unit/L Final     Estimated GFR-Non    Date Value Ref Range Status   08/04/2022 >60 mls/min/1.73/m2 Final         Assessment:         1. Essential thrombocytosis    2. Therapeutic drug monitoring          Essential thrombocytosis -- CALR mutation positive, 52 bp deletion Exon 9   --on Hydrea and Agrylin  Anemia-due to therapy  Leukopenia-due to therapy       Plan:           Contine Hydrea 500 mg BID and Agrylin 0.5 mg daily.        Cont ASA 81 mg daily     RTC 12 weeks with CBC, CMP same day    Encouraged to call for any questions or problems  The patient was given ample opportunity to ask questions and they were all answered to satisfaction; patient demonstrated understanding of what we discussed and is agreeable to the  plan.    Margret Simmons, AAKASHP

## 2024-02-16 DIAGNOSIS — D47.3 ESSENTIAL THROMBOCYTOSIS: ICD-10-CM

## 2024-02-16 RX ORDER — ANAGRELIDE 0.5 MG/1
0.5 CAPSULE ORAL DAILY
Qty: 30 CAPSULE | Refills: 5 | Status: SHIPPED | OUTPATIENT
Start: 2024-02-16

## 2024-04-17 ENCOUNTER — OFFICE VISIT (OUTPATIENT)
Dept: HEMATOLOGY/ONCOLOGY | Facility: CLINIC | Age: 74
End: 2024-04-17
Payer: MEDICARE

## 2024-04-17 VITALS
OXYGEN SATURATION: 99 % | SYSTOLIC BLOOD PRESSURE: 138 MMHG | BODY MASS INDEX: 26.76 KG/M2 | DIASTOLIC BLOOD PRESSURE: 81 MMHG | WEIGHT: 166.5 LBS | HEART RATE: 68 BPM | HEIGHT: 66 IN | TEMPERATURE: 98 F | RESPIRATION RATE: 18 BRPM

## 2024-04-17 DIAGNOSIS — D47.3 ESSENTIAL THROMBOCYTOSIS: Primary | ICD-10-CM

## 2024-04-17 PROCEDURE — 3079F DIAST BP 80-89 MM HG: CPT | Mod: CPTII,S$GLB,, | Performed by: NURSE PRACTITIONER

## 2024-04-17 PROCEDURE — 3075F SYST BP GE 130 - 139MM HG: CPT | Mod: CPTII,S$GLB,, | Performed by: NURSE PRACTITIONER

## 2024-04-17 PROCEDURE — 1126F AMNT PAIN NOTED NONE PRSNT: CPT | Mod: CPTII,S$GLB,, | Performed by: NURSE PRACTITIONER

## 2024-04-17 PROCEDURE — 1160F RVW MEDS BY RX/DR IN RCRD: CPT | Mod: CPTII,S$GLB,, | Performed by: NURSE PRACTITIONER

## 2024-04-17 PROCEDURE — 99999 PR PBB SHADOW E&M-EST. PATIENT-LVL IV: CPT | Mod: PBBFAC,,, | Performed by: NURSE PRACTITIONER

## 2024-04-17 PROCEDURE — 3008F BODY MASS INDEX DOCD: CPT | Mod: CPTII,S$GLB,, | Performed by: NURSE PRACTITIONER

## 2024-04-17 PROCEDURE — 4010F ACE/ARB THERAPY RXD/TAKEN: CPT | Mod: CPTII,S$GLB,, | Performed by: NURSE PRACTITIONER

## 2024-04-17 PROCEDURE — 3288F FALL RISK ASSESSMENT DOCD: CPT | Mod: CPTII,S$GLB,, | Performed by: NURSE PRACTITIONER

## 2024-04-17 PROCEDURE — 99215 OFFICE O/P EST HI 40 MIN: CPT | Mod: S$GLB,,, | Performed by: NURSE PRACTITIONER

## 2024-04-17 PROCEDURE — 1101F PT FALLS ASSESS-DOCD LE1/YR: CPT | Mod: CPTII,S$GLB,, | Performed by: NURSE PRACTITIONER

## 2024-04-17 PROCEDURE — 1159F MED LIST DOCD IN RCRD: CPT | Mod: CPTII,S$GLB,, | Performed by: NURSE PRACTITIONER

## 2024-04-17 NOTE — PROGRESS NOTES
HEMATOLOGY/ONCOLOGY OFFICE CLINIC VISIT    Visit Information:  Visit type:  On-going care, Review/discussion of tests, Scheduled follow-up.    Accompanied by:  No one.    Source of history:  Self.    Referral source:  Roc GIFFORD, Isaac HUANG    History limitation:  None.        Initial Consultation: 3/28/2022  Referring Physician: Dr Brian  Other Physicians:  Code Status: Not addressed    Diagnosis/Problem list:   Essential Thrombocytosis  --CALR mutation analysis, exon 9: Positive.    Present Treatment:  Hydrea 500 mg BID + Agrylin 0.5 mg daily on 9/12/23    Treatment history:    Hydrea 500 mg Daily + Agrylin 1 mg daily (2mg on Sat/Sun only)  Hydrea 500 mg BID + Agrylin 1 mg daily (2/9/23)    Imaging:      Pathology:  March 21, 2022 15:21 Peripheral blood, CALR mutation analysis, exon 9: Positive. 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 Peripheral blood, JAK2 V617F mutation analysis: Negative for JAK2 V617F.    CLINICAL HISTORY:       Patient: 72 years old female kindly referred for thrombocytosis.     Reviewing electronic on medical record it seems like her platelets were normal up to March 2018.  In March 2019 platelets were mildly elevated 496,009 slowly there were trending up with the most recent on 3/15/2022 of 1,012,000.  She is taking baby aspirin.    March 21, 2022 15:21 Peripheral blood, CALR mutation analysis, exon 9: Positive. 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  Peripheral blood, JAK2 V617F mutation analysis: Negative for JAK2 V617F.    She denies any family history of blood disorders or malignancies.        Chief Complaint: 12 Week Follow Up    Here for ET    Interval History:    Patient presents today for 12 week follow up appointment.  She was taking Hydrea 500 mg BID and Agrylin 1 mg daily since 2/9/23. In 9/2023, we decreased Agrylin to 0.5 mg daily d/t anemia.  She was mildly anemic before she even started Hydrea in 3/2022. Her WBC has been low but today  it is slightly lower at  2.38, ANC 1.22.  She denies any signs or symptoms of infection.  She denies any fever, chills, sweats.  No chest pain or shortness of breath.  No bleeding.   No headaches or changes in vision. No neurologic deficit.     Review of Systems   Constitutional:  Positive for malaise/fatigue.         Histories:  PMH/PSH/FH/SOCIAL/ALLERGIES AND MEDS REVIEWED AND UPDATED AS APPROPRIATE       There were no vitals filed for this visit.         Physical Exam  Vitals and nursing note reviewed.   Constitutional:       General: She is not in acute distress.     Appearance: Normal appearance. She is well-developed.   HENT:      Head: Normocephalic and atraumatic.      Mouth/Throat:      Mouth: Mucous membranes are moist.   Eyes:      General: No scleral icterus.     Extraocular Movements: Extraocular movements intact.      Conjunctiva/sclera: Conjunctivae normal.      Pupils: Pupils are equal, round, and reactive to light.   Neck:      Vascular: No JVD.   Cardiovascular:      Rate and Rhythm: Normal rate and regular rhythm.      Heart sounds: No murmur heard.  Pulmonary:      Effort: Pulmonary effort is normal.      Breath sounds: Normal breath sounds. No wheezing or rhonchi.   Chest:      Chest wall: No deformity or tenderness.   Breasts:     Right: No swelling, mass, nipple discharge, skin change or tenderness.      Left: No swelling, mass, nipple discharge, skin change or tenderness.   Abdominal:      General: Bowel sounds are normal. There is no distension.      Palpations: Abdomen is soft. There is no splenomegaly or mass.      Tenderness: There is no abdominal tenderness.   Musculoskeletal:         General: No swelling or deformity.      Cervical back: Neck supple.      Right lower leg: Edema present.      Left lower leg: Edema present.   Lymphadenopathy:      Cervical: No cervical adenopathy.      Upper Body:      Right upper body: No supraclavicular or axillary adenopathy.      Left upper body: No  supraclavicular or axillary adenopathy.      Lower Body: No right inguinal adenopathy. No left inguinal adenopathy.   Skin:     General: Skin is warm.      Coloration: Skin is not jaundiced.      Findings: No lesion or rash.      Nails: There is no clubbing.   Neurological:      General: No focal deficit present.      Mental Status: She is alert and oriented to person, place, and time.      Sensory: Sensation is intact.      Motor: Motor function is intact.      Gait: Gait is intact.   Psychiatric:         Attention and Perception: Attention normal.         Mood and Affect: Mood and affect normal.         Speech: Speech normal.         Behavior: Behavior is cooperative.         Thought Content: Thought content normal.         Cognition and Memory: Cognition normal.         Judgment: Judgment normal.     ECOG SCORE             Laboratory:  CBC with Differential:  Lab Results   Component Value Date    WBC 2.38 (L) 04/17/2024    RBC 2.65 (L) 04/17/2024    HGB 10.6 (L) 04/17/2024    HCT 31.1 (L) 04/17/2024    .4 (H) 04/17/2024    MCH 40.0 (H) 04/17/2024    MCHC 34.1 04/17/2024    RDW 13.6 04/17/2024     (H) 04/17/2024    MPV 8.6 04/17/2024       Latest Reference Range & Units 05/23/22 08:40 06/09/22 07:37 06/23/22 11:27 07/07/22 07:27 07/21/22 11:18 08/04/22 08:10 08/18/22 10:52   WBC 4.5 - 11.5 x10(3)/mcL 2.6 (L) 3.3 (L) 4.0 (L) 3.1 (L) 3.4 (L) 3.5 (L) 4.1 (L)   (L): Data is abnormally low     Reference Range & Units 05/23/22 08:40 06/09/22 07:37 06/23/22 11:27 07/07/22 07:27 07/21/22 11:18 08/04/22 08:10 08/18/22 10:52   Platelets 130 - 400 x10(3)/mcL 449 (H) 557 (H) 612 (H) 623 (H) 646 (H) 673 (H) 590 (H)   (H): Data is abnormally high   Reference Range & Units 05/23/22 08:40 06/09/22 07:37 06/23/22 11:27 07/07/22 07:27 07/21/22 11:18 08/04/22 08:10 08/18/22 10:52   Hemoglobin 12.0 - 16.0 gm/dL 10.5 (L) 10.6 (L) 10.8 (L) 10.9 (L) 11.0 (L) 10.8 (L) 11.3 (L)   (L): Data is abnormally low    CMP:  Sodium  Level   Date Value Ref Range Status   01/23/2024 140 136 - 145 mmol/L Final     Potassium Level   Date Value Ref Range Status   01/23/2024 4.7 3.5 - 5.1 mmol/L Final     Carbon Dioxide   Date Value Ref Range Status   01/23/2024 26 23 - 31 mmol/L Final     Blood Urea Nitrogen   Date Value Ref Range Status   01/23/2024 17.5 9.8 - 20.1 mg/dL Final     Creatinine   Date Value Ref Range Status   01/23/2024 0.71 0.55 - 1.02 mg/dL Final     Calcium Level Total   Date Value Ref Range Status   01/23/2024 9.3 8.4 - 10.2 mg/dL Final     Albumin Level   Date Value Ref Range Status   01/23/2024 4.1 3.4 - 4.8 g/dL Final     Bilirubin Total   Date Value Ref Range Status   01/23/2024 0.7 <=1.5 mg/dL Final     Alkaline Phosphatase   Date Value Ref Range Status   01/23/2024 72 40 - 150 unit/L Final     Aspartate Aminotransferase   Date Value Ref Range Status   01/23/2024 17 5 - 34 unit/L Final     Alanine Aminotransferase   Date Value Ref Range Status   01/23/2024 10 0 - 55 unit/L Final     Estimated GFR-Non    Date Value Ref Range Status   08/04/2022 >60 mls/min/1.73/m2 Final         Assessment:         No diagnosis found.        Essential thrombocytosis -- CALR mutation positive, 52 bp deletion Exon 9   --on Hydrea and Agrylin  Anemia-due to therapy  Leukopenia-due to therapy       Plan:           Contine Hydrea 500 mg BID but decrease Agrylin 0.5 mg daily by 2 pills each week. She will HOLD Agrylin on Sat/Sun ( she will take it M-F only)      Cont ASA 81 mg daily     RTC 12 weeks with CBC, CMP same day    Encouraged to call for any questions or problems  The patient was given ample opportunity to ask questions and they were all answered to satisfaction; patient demonstrated understanding of what we discussed and is agreeable to the plan.    GORDON Collazo

## 2024-04-22 PROBLEM — Z00.00 WELLNESS EXAMINATION: Status: RESOLVED | Noted: 2024-01-18 | Resolved: 2024-04-22

## 2024-05-09 DIAGNOSIS — D47.3 ESSENTIAL THROMBOCYTOSIS: ICD-10-CM

## 2024-05-09 RX ORDER — HYDROXYUREA 500 MG/1
500 CAPSULE ORAL 2 TIMES DAILY
Qty: 60 CAPSULE | Refills: 3 | Status: SHIPPED | OUTPATIENT
Start: 2024-05-09

## 2024-07-17 ENCOUNTER — LAB VISIT (OUTPATIENT)
Dept: LAB | Facility: HOSPITAL | Age: 74
End: 2024-07-17
Attending: NURSE PRACTITIONER
Payer: MEDICARE

## 2024-07-17 ENCOUNTER — OFFICE VISIT (OUTPATIENT)
Dept: HEMATOLOGY/ONCOLOGY | Facility: CLINIC | Age: 74
End: 2024-07-17
Payer: MEDICARE

## 2024-07-17 VITALS
OXYGEN SATURATION: 97 % | RESPIRATION RATE: 18 BRPM | DIASTOLIC BLOOD PRESSURE: 74 MMHG | WEIGHT: 160.63 LBS | SYSTOLIC BLOOD PRESSURE: 126 MMHG | TEMPERATURE: 98 F | BODY MASS INDEX: 25.81 KG/M2 | HEART RATE: 67 BPM | HEIGHT: 66 IN

## 2024-07-17 DIAGNOSIS — Z79.899 DRUG-INDUCED IMMUNODEFICIENCY: ICD-10-CM

## 2024-07-17 DIAGNOSIS — Z51.81 THERAPEUTIC DRUG MONITORING: ICD-10-CM

## 2024-07-17 DIAGNOSIS — D64.9 ANEMIA, UNSPECIFIED TYPE: ICD-10-CM

## 2024-07-17 DIAGNOSIS — D47.3 ESSENTIAL THROMBOCYTOSIS: Primary | ICD-10-CM

## 2024-07-17 DIAGNOSIS — D47.3 ESSENTIAL THROMBOCYTOSIS: ICD-10-CM

## 2024-07-17 DIAGNOSIS — D84.821 DRUG-INDUCED IMMUNODEFICIENCY: ICD-10-CM

## 2024-07-17 LAB
ALBUMIN SERPL-MCNC: 4.3 G/DL (ref 3.4–4.8)
ALBUMIN/GLOB SERPL: 1.6 RATIO (ref 1.1–2)
ALP SERPL-CCNC: 69 UNIT/L (ref 40–150)
ALT SERPL-CCNC: 11 UNIT/L (ref 0–55)
ANION GAP SERPL CALC-SCNC: 10 MEQ/L
AST SERPL-CCNC: 19 UNIT/L (ref 5–34)
BASOPHILS # BLD AUTO: 0.04 X10(3)/MCL
BASOPHILS NFR BLD AUTO: 1.6 %
BILIRUB SERPL-MCNC: 0.5 MG/DL
BUN SERPL-MCNC: 15.1 MG/DL (ref 9.8–20.1)
CALCIUM SERPL-MCNC: 9.7 MG/DL (ref 8.4–10.2)
CHLORIDE SERPL-SCNC: 105 MMOL/L (ref 98–107)
CO2 SERPL-SCNC: 28 MMOL/L (ref 23–31)
CREAT SERPL-MCNC: 0.72 MG/DL (ref 0.55–1.02)
CREAT/UREA NIT SERPL: 21
EOSINOPHIL # BLD AUTO: 0.01 X10(3)/MCL (ref 0–0.9)
EOSINOPHIL NFR BLD AUTO: 0.4 %
ERYTHROCYTE [DISTWIDTH] IN BLOOD BY AUTOMATED COUNT: 12.9 % (ref 11.5–17)
GFR SERPLBLD CREATININE-BSD FMLA CKD-EPI: >60 ML/MIN/1.73/M2
GLOBULIN SER-MCNC: 2.7 GM/DL (ref 2.4–3.5)
GLUCOSE SERPL-MCNC: 94 MG/DL (ref 82–115)
HCT VFR BLD AUTO: 30.4 % (ref 37–47)
HGB BLD-MCNC: 10.4 G/DL (ref 12–16)
IMM GRANULOCYTES # BLD AUTO: 0 X10(3)/MCL (ref 0–0.04)
IMM GRANULOCYTES NFR BLD AUTO: 0 %
LYMPHOCYTES # BLD AUTO: 0.6 X10(3)/MCL (ref 0.6–4.6)
LYMPHOCYTES NFR BLD AUTO: 24.1 %
MCH RBC QN AUTO: 40.6 PG (ref 27–31)
MCHC RBC AUTO-ENTMCNC: 34.2 G/DL (ref 33–36)
MCV RBC AUTO: 118.8 FL (ref 80–94)
MONOCYTES # BLD AUTO: 0.27 X10(3)/MCL (ref 0.1–1.3)
MONOCYTES NFR BLD AUTO: 10.8 %
NEUTROPHILS # BLD AUTO: 1.57 X10(3)/MCL (ref 2.1–9.2)
NEUTROPHILS NFR BLD AUTO: 63.1 %
PLATELET # BLD AUTO: 474 X10(3)/MCL (ref 130–400)
PMV BLD AUTO: 9 FL (ref 7.4–10.4)
POTASSIUM SERPL-SCNC: 4.8 MMOL/L (ref 3.5–5.1)
PROT SERPL-MCNC: 7 GM/DL (ref 5.8–7.6)
RBC # BLD AUTO: 2.56 X10(6)/MCL (ref 4.2–5.4)
SODIUM SERPL-SCNC: 143 MMOL/L (ref 136–145)
WBC # BLD AUTO: 2.49 X10(3)/MCL (ref 4.5–11.5)

## 2024-07-17 PROCEDURE — 3008F BODY MASS INDEX DOCD: CPT | Mod: CPTII,S$GLB,, | Performed by: NURSE PRACTITIONER

## 2024-07-17 PROCEDURE — 4010F ACE/ARB THERAPY RXD/TAKEN: CPT | Mod: CPTII,S$GLB,, | Performed by: NURSE PRACTITIONER

## 2024-07-17 PROCEDURE — 36415 COLL VENOUS BLD VENIPUNCTURE: CPT

## 2024-07-17 PROCEDURE — 99215 OFFICE O/P EST HI 40 MIN: CPT | Mod: S$GLB,,, | Performed by: NURSE PRACTITIONER

## 2024-07-17 PROCEDURE — 1159F MED LIST DOCD IN RCRD: CPT | Mod: CPTII,S$GLB,, | Performed by: NURSE PRACTITIONER

## 2024-07-17 PROCEDURE — 99999 PR PBB SHADOW E&M-EST. PATIENT-LVL III: CPT | Mod: PBBFAC,,, | Performed by: NURSE PRACTITIONER

## 2024-07-17 PROCEDURE — 3074F SYST BP LT 130 MM HG: CPT | Mod: CPTII,S$GLB,, | Performed by: NURSE PRACTITIONER

## 2024-07-17 PROCEDURE — 3288F FALL RISK ASSESSMENT DOCD: CPT | Mod: CPTII,S$GLB,, | Performed by: NURSE PRACTITIONER

## 2024-07-17 PROCEDURE — 3078F DIAST BP <80 MM HG: CPT | Mod: CPTII,S$GLB,, | Performed by: NURSE PRACTITIONER

## 2024-07-17 PROCEDURE — 1126F AMNT PAIN NOTED NONE PRSNT: CPT | Mod: CPTII,S$GLB,, | Performed by: NURSE PRACTITIONER

## 2024-07-17 PROCEDURE — 80053 COMPREHEN METABOLIC PANEL: CPT

## 2024-07-17 PROCEDURE — 85025 COMPLETE CBC W/AUTO DIFF WBC: CPT

## 2024-07-17 PROCEDURE — 1101F PT FALLS ASSESS-DOCD LE1/YR: CPT | Mod: CPTII,S$GLB,, | Performed by: NURSE PRACTITIONER

## 2024-07-17 NOTE — PROGRESS NOTES
HEMATOLOGY/ONCOLOGY OFFICE CLINIC VISIT    Visit Information:  Visit type:  On-going care, Review/discussion of tests, Scheduled follow-up.    Accompanied by:  No one.    Source of history:  Self.    Referral source:  Roc GIFFORD, Isaac HUANG    History limitation:  None.        Initial Consultation: 3/28/2022  Referring Physician: Dr Brian  Other Physicians:  Code Status: Not addressed    Diagnosis/Problem list:   Essential Thrombocytosis  --CALR mutation analysis, exon 9: Positive.    Present Treatment:  Hydrea 500 mg BID + Agrylin 0.5 mg daily on 9/12/23    Treatment history:    Hydrea 500 mg Daily + Agrylin 1 mg daily (2mg on Sat/Sun only)  Hydrea 500 mg BID + Agrylin 1 mg daily (2/9/23)    Imaging:      Pathology:  March 21, 2022 15:21 Peripheral blood, CALR mutation analysis, exon 9: Positive. 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 Peripheral blood, JAK2 V617F mutation analysis: Negative for JAK2 V617F.    CLINICAL HISTORY:       Patient: 74 years old female kindly referred for thrombocytosis.     Reviewing electronic on medical record it seems like her platelets were normal up to March 2018.  In March 2019 platelets were mildly elevated 496,009 slowly there were trending up with the most recent on 3/15/2022 of 1,012,000.  She is taking baby aspirin.    March 21, 2022 15:21 Peripheral blood, CALR mutation analysis, exon 9: Positive. 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  Peripheral blood, JAK2 V617F mutation analysis: Negative for JAK2 V617F.    She denies any family history of blood disorders or malignancies.        Chief Complaint: 12 Week Follow Up    Here for ET    Interval History:    Patient presents today for 12 week follow up appointment.  She was taking Hydrea 500 mg BID and Agrylin 1 mg daily since 2/9/23. In 9/2023, we decreased Agrylin to 0.5 mg daily d/t anemia.  On 4/17/24, we dropped the weekend doses of Agrylin so that she would take 0.5 mg daily M-F  "only. She was mildly anemic before she even started Hydrea in 3/2022. Her WBC has been low but today it is improved at 2.56, ANC 1.57.  She denies any signs or symptoms of infection.  She denies any fever, chills, sweats.  No chest pain or shortness of breath.  No bleeding.   No headaches or changes in vision. No neurologic deficit. She has been walking for exercise each day and she has improved her diet. Reports that she feels better.     Review of Systems   Constitutional:  Positive for malaise/fatigue.         Histories:  PMH/PSH/FH/SOCIAL/ALLERGIES AND MEDS REVIEWED AND UPDATED AS APPROPRIATE       Vitals:    07/17/24 1036   BP: 126/74   BP Location: Left arm   Patient Position: Sitting   Pulse: 67   Resp: 18   Temp: 97.5 °F (36.4 °C)   TempSrc: Oral   SpO2: 97%   Weight: 72.8 kg (160 lb 9.6 oz)   Height: 5' 6" (1.676 m)            Physical Exam  Vitals and nursing note reviewed.   Constitutional:       General: She is not in acute distress.     Appearance: Normal appearance. She is well-developed.   HENT:      Head: Normocephalic and atraumatic.      Mouth/Throat:      Mouth: Mucous membranes are moist.   Eyes:      General: No scleral icterus.     Extraocular Movements: Extraocular movements intact.      Conjunctiva/sclera: Conjunctivae normal.      Pupils: Pupils are equal, round, and reactive to light.   Neck:      Vascular: No JVD.   Cardiovascular:      Rate and Rhythm: Normal rate and regular rhythm.      Heart sounds: No murmur heard.  Pulmonary:      Effort: Pulmonary effort is normal.      Breath sounds: Normal breath sounds. No wheezing or rhonchi.   Chest:      Chest wall: No deformity or tenderness.   Breasts:     Right: No swelling, mass, nipple discharge, skin change or tenderness.      Left: No swelling, mass, nipple discharge, skin change or tenderness.   Abdominal:      General: Bowel sounds are normal. There is no distension.      Palpations: Abdomen is soft. There is no splenomegaly or mass. "      Tenderness: There is no abdominal tenderness.   Musculoskeletal:         General: No swelling or deformity.      Cervical back: Neck supple.      Right lower leg: Edema present.      Left lower leg: Edema present.   Lymphadenopathy:      Cervical: No cervical adenopathy.      Upper Body:      Right upper body: No supraclavicular or axillary adenopathy.      Left upper body: No supraclavicular or axillary adenopathy.      Lower Body: No right inguinal adenopathy. No left inguinal adenopathy.   Skin:     General: Skin is warm.      Coloration: Skin is not jaundiced.      Findings: No lesion or rash.      Nails: There is no clubbing.   Neurological:      General: No focal deficit present.      Mental Status: She is alert and oriented to person, place, and time.      Sensory: Sensation is intact.      Motor: Motor function is intact.      Gait: Gait is intact.   Psychiatric:         Attention and Perception: Attention normal.         Mood and Affect: Mood and affect normal.         Speech: Speech normal.         Behavior: Behavior is cooperative.         Thought Content: Thought content normal.         Cognition and Memory: Cognition normal.         Judgment: Judgment normal.       ECOG SCORE    0 - Fully active-able to carry on all pre-disease performance without restriction         Laboratory:  CBC with Differential:  Lab Results   Component Value Date    WBC 2.49 (L) 07/17/2024    RBC 2.56 (L) 07/17/2024    HGB 10.4 (L) 07/17/2024    HCT 30.4 (L) 07/17/2024    .8 (H) 07/17/2024    MCH 40.6 (H) 07/17/2024    MCHC 34.2 07/17/2024    RDW 12.9 07/17/2024     (H) 07/17/2024    MPV 9.0 07/17/2024       Latest Reference Range & Units 05/23/22 08:40 06/09/22 07:37 06/23/22 11:27 07/07/22 07:27 07/21/22 11:18 08/04/22 08:10 08/18/22 10:52   WBC 4.5 - 11.5 x10(3)/mcL 2.6 (L) 3.3 (L) 4.0 (L) 3.1 (L) 3.4 (L) 3.5 (L) 4.1 (L)   (L): Data is abnormally low     Reference Range & Units 05/23/22 08:40 06/09/22 07:37  06/23/22 11:27 07/07/22 07:27 07/21/22 11:18 08/04/22 08:10 08/18/22 10:52   Platelets 130 - 400 x10(3)/mcL 449 (H) 557 (H) 612 (H) 623 (H) 646 (H) 673 (H) 590 (H)   (H): Data is abnormally high   Reference Range & Units 05/23/22 08:40 06/09/22 07:37 06/23/22 11:27 07/07/22 07:27 07/21/22 11:18 08/04/22 08:10 08/18/22 10:52   Hemoglobin 12.0 - 16.0 gm/dL 10.5 (L) 10.6 (L) 10.8 (L) 10.9 (L) 11.0 (L) 10.8 (L) 11.3 (L)   (L): Data is abnormally low    CMP:  Sodium   Date Value Ref Range Status   04/17/2024 139 136 - 145 mmol/L Final     Potassium   Date Value Ref Range Status   04/17/2024 4.6 3.5 - 5.1 mmol/L Final     Chloride   Date Value Ref Range Status   04/17/2024 106 98 - 107 mmol/L Final     CO2   Date Value Ref Range Status   04/17/2024 26 23 - 31 mmol/L Final     Blood Urea Nitrogen   Date Value Ref Range Status   04/17/2024 10.1 9.8 - 20.1 mg/dL Final     Creatinine   Date Value Ref Range Status   04/17/2024 0.75 0.55 - 1.02 mg/dL Final     Calcium   Date Value Ref Range Status   04/17/2024 9.3 8.4 - 10.2 mg/dL Final     Albumin   Date Value Ref Range Status   04/17/2024 4.2 3.4 - 4.8 g/dL Final     Bilirubin Total   Date Value Ref Range Status   04/17/2024 0.4 <=1.5 mg/dL Final     ALP   Date Value Ref Range Status   04/17/2024 71 40 - 150 unit/L Final     AST   Date Value Ref Range Status   04/17/2024 26 5 - 34 unit/L Final     ALT   Date Value Ref Range Status   04/17/2024 18 0 - 55 unit/L Final     Estimated GFR-Non    Date Value Ref Range Status   08/04/2022 >60 mls/min/1.73/m2 Final         Assessment:         1. Essential thrombocytosis    2. Therapeutic drug monitoring    3. Anemia, unspecified type    4. Drug-induced immunodeficiency            Essential thrombocytosis -- CALR mutation positive, 52 bp deletion Exon 9   --on Hydrea and Agrylin  Anemia-due to therapy  Leukopenia-due to therapy       Plan:        Contine Hydrea 500 mg BID and Agrylin 0.5 mg daily M-F only. She will HOLD  Agrylin on Sat/Sun.      Cont ASA 81 mg daily     RTC 12 weeks with CBC, CMP same day    Encouraged to call for any questions or problems  The patient was given ample opportunity to ask questions and they were all answered to satisfaction; patient demonstrated understanding of what we discussed and is agreeable to the plan.    GORDON Collazo

## 2024-09-20 DIAGNOSIS — D47.3 ESSENTIAL THROMBOCYTOSIS: ICD-10-CM

## 2024-09-20 RX ORDER — HYDROXYUREA 500 MG/1
500 CAPSULE ORAL 2 TIMES DAILY
Qty: 60 CAPSULE | Refills: 1 | Status: SHIPPED | OUTPATIENT
Start: 2024-09-20

## 2024-10-09 ENCOUNTER — OFFICE VISIT (OUTPATIENT)
Dept: HEMATOLOGY/ONCOLOGY | Facility: CLINIC | Age: 74
End: 2024-10-09
Payer: MEDICARE

## 2024-10-09 ENCOUNTER — LAB VISIT (OUTPATIENT)
Dept: LAB | Facility: HOSPITAL | Age: 74
End: 2024-10-09
Attending: STUDENT IN AN ORGANIZED HEALTH CARE EDUCATION/TRAINING PROGRAM
Payer: MEDICARE

## 2024-10-09 VITALS
SYSTOLIC BLOOD PRESSURE: 159 MMHG | WEIGHT: 164 LBS | BODY MASS INDEX: 26.36 KG/M2 | RESPIRATION RATE: 18 BRPM | HEIGHT: 66 IN | TEMPERATURE: 98 F | DIASTOLIC BLOOD PRESSURE: 88 MMHG | HEART RATE: 68 BPM | OXYGEN SATURATION: 98 %

## 2024-10-09 DIAGNOSIS — D47.3 ESSENTIAL THROMBOCYTOSIS: Primary | ICD-10-CM

## 2024-10-09 DIAGNOSIS — D47.3 ESSENTIAL THROMBOCYTOSIS: ICD-10-CM

## 2024-10-09 DIAGNOSIS — Z51.81 THERAPEUTIC DRUG MONITORING: ICD-10-CM

## 2024-10-09 DIAGNOSIS — D64.9 ANEMIA, UNSPECIFIED TYPE: ICD-10-CM

## 2024-10-09 DIAGNOSIS — Z79.899 DRUG-INDUCED IMMUNODEFICIENCY: ICD-10-CM

## 2024-10-09 DIAGNOSIS — D84.821 DRUG-INDUCED IMMUNODEFICIENCY: ICD-10-CM

## 2024-10-09 LAB
ALBUMIN SERPL-MCNC: 4.2 G/DL (ref 3.4–4.8)
ALBUMIN/GLOB SERPL: 1.6 RATIO (ref 1.1–2)
ALP SERPL-CCNC: 68 UNIT/L (ref 40–150)
ALT SERPL-CCNC: 10 UNIT/L (ref 0–55)
ANION GAP SERPL CALC-SCNC: 8 MEQ/L
AST SERPL-CCNC: 17 UNIT/L (ref 5–34)
BASOPHILS # BLD AUTO: 0.03 X10(3)/MCL
BASOPHILS NFR BLD AUTO: 0.9 %
BILIRUB SERPL-MCNC: 0.4 MG/DL
BUN SERPL-MCNC: 17.5 MG/DL (ref 9.8–20.1)
CALCIUM SERPL-MCNC: 9.3 MG/DL (ref 8.4–10.2)
CHLORIDE SERPL-SCNC: 108 MMOL/L (ref 98–107)
CO2 SERPL-SCNC: 26 MMOL/L (ref 23–31)
CREAT SERPL-MCNC: 0.66 MG/DL (ref 0.55–1.02)
CREAT/UREA NIT SERPL: 27
EOSINOPHIL # BLD AUTO: 0.02 X10(3)/MCL (ref 0–0.9)
EOSINOPHIL NFR BLD AUTO: 0.6 %
ERYTHROCYTE [DISTWIDTH] IN BLOOD BY AUTOMATED COUNT: 12 % (ref 11.5–17)
GFR SERPLBLD CREATININE-BSD FMLA CKD-EPI: >60 ML/MIN/1.73/M2
GLOBULIN SER-MCNC: 2.6 GM/DL (ref 2.4–3.5)
GLUCOSE SERPL-MCNC: 90 MG/DL (ref 82–115)
HCT VFR BLD AUTO: 31 % (ref 37–47)
HGB BLD-MCNC: 10.6 G/DL (ref 12–16)
IMM GRANULOCYTES # BLD AUTO: 0 X10(3)/MCL (ref 0–0.04)
IMM GRANULOCYTES NFR BLD AUTO: 0 %
LDH SERPL-CCNC: 240 U/L (ref 125–220)
LYMPHOCYTES # BLD AUTO: 0.72 X10(3)/MCL (ref 0.6–4.6)
LYMPHOCYTES NFR BLD AUTO: 22.5 %
MCH RBC QN AUTO: 41.4 PG (ref 27–31)
MCHC RBC AUTO-ENTMCNC: 34.2 G/DL (ref 33–36)
MCV RBC AUTO: 121.1 FL (ref 80–94)
MONOCYTES # BLD AUTO: 0.36 X10(3)/MCL (ref 0.1–1.3)
MONOCYTES NFR BLD AUTO: 11.3 %
NEUTROPHILS # BLD AUTO: 2.07 X10(3)/MCL (ref 2.1–9.2)
NEUTROPHILS NFR BLD AUTO: 64.7 %
PLATELET # BLD AUTO: 429 X10(3)/MCL (ref 130–400)
PMV BLD AUTO: 8.8 FL (ref 7.4–10.4)
POTASSIUM SERPL-SCNC: 4.7 MMOL/L (ref 3.5–5.1)
PROT SERPL-MCNC: 6.8 GM/DL (ref 5.8–7.6)
RBC # BLD AUTO: 2.56 X10(6)/MCL (ref 4.2–5.4)
SODIUM SERPL-SCNC: 142 MMOL/L (ref 136–145)
WBC # BLD AUTO: 3.2 X10(3)/MCL (ref 4.5–11.5)

## 2024-10-09 PROCEDURE — 83615 LACTATE (LD) (LDH) ENZYME: CPT

## 2024-10-09 PROCEDURE — 1101F PT FALLS ASSESS-DOCD LE1/YR: CPT | Mod: CPTII,S$GLB,, | Performed by: NURSE PRACTITIONER

## 2024-10-09 PROCEDURE — 3008F BODY MASS INDEX DOCD: CPT | Mod: CPTII,S$GLB,, | Performed by: NURSE PRACTITIONER

## 2024-10-09 PROCEDURE — 3079F DIAST BP 80-89 MM HG: CPT | Mod: CPTII,S$GLB,, | Performed by: NURSE PRACTITIONER

## 2024-10-09 PROCEDURE — 85025 COMPLETE CBC W/AUTO DIFF WBC: CPT

## 2024-10-09 PROCEDURE — 99215 OFFICE O/P EST HI 40 MIN: CPT | Mod: S$GLB,,, | Performed by: NURSE PRACTITIONER

## 2024-10-09 PROCEDURE — 36415 COLL VENOUS BLD VENIPUNCTURE: CPT

## 2024-10-09 PROCEDURE — 3288F FALL RISK ASSESSMENT DOCD: CPT | Mod: CPTII,S$GLB,, | Performed by: NURSE PRACTITIONER

## 2024-10-09 PROCEDURE — 4010F ACE/ARB THERAPY RXD/TAKEN: CPT | Mod: CPTII,S$GLB,, | Performed by: NURSE PRACTITIONER

## 2024-10-09 PROCEDURE — 1159F MED LIST DOCD IN RCRD: CPT | Mod: CPTII,S$GLB,, | Performed by: NURSE PRACTITIONER

## 2024-10-09 PROCEDURE — 80053 COMPREHEN METABOLIC PANEL: CPT

## 2024-10-09 PROCEDURE — 1126F AMNT PAIN NOTED NONE PRSNT: CPT | Mod: CPTII,S$GLB,, | Performed by: NURSE PRACTITIONER

## 2024-10-09 PROCEDURE — 3077F SYST BP >= 140 MM HG: CPT | Mod: CPTII,S$GLB,, | Performed by: NURSE PRACTITIONER

## 2024-10-09 PROCEDURE — 99999 PR PBB SHADOW E&M-EST. PATIENT-LVL III: CPT | Mod: PBBFAC,,, | Performed by: NURSE PRACTITIONER

## 2024-10-09 RX ORDER — ESCITALOPRAM OXALATE 20 MG/1
20 TABLET ORAL DAILY
Qty: 90 TABLET | Refills: 0 | Status: SHIPPED | OUTPATIENT
Start: 2024-10-09

## 2024-10-09 NOTE — PROGRESS NOTES
HEMATOLOGY/ONCOLOGY OFFICE CLINIC VISIT    Visit Information:  Visit type:  On-going care, Review/discussion of tests, Scheduled follow-up.    Accompanied by:  No one.    Source of history:  Self.    Referral source:  Roc GIFFORD, Isaac HUANG    History limitation:  None.        Initial Consultation: 3/28/2022  Referring Physician: Dr Brian  Other Physicians:  Code Status: Not addressed    Diagnosis/Problem list:   Essential Thrombocytosis  --CALR mutation analysis, exon 9: Positive.    Present Treatment:  Hydrea 500 mg BID + Agrylin 0.5 mg daily on 9/12/23    Treatment history:    Hydrea 500 mg Daily + Agrylin 1 mg daily (2mg on Sat/Sun only)  Hydrea 500 mg BID + Agrylin 1 mg daily (2/9/23)    Imaging:      Pathology:  March 21, 2022 15:21 Peripheral blood, CALR mutation analysis, exon 9: Positive. 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 Peripheral blood, JAK2 V617F mutation analysis: Negative for JAK2 V617F.    CLINICAL HISTORY:       Patient: 74 years old female kindly referred for thrombocytosis.     Reviewing electronic on medical record it seems like her platelets were normal up to March 2018.  In March 2019 platelets were mildly elevated 496,009 slowly there were trending up with the most recent on 3/15/2022 of 1,012,000.  She is taking baby aspirin.    March 21, 2022 15:21 Peripheral blood, CALR mutation analysis, exon 9: Positive. 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  Peripheral blood, JAK2 V617F mutation analysis: Negative for JAK2 V617F.    She denies any family history of blood disorders or malignancies.        Chief Complaint: 12 Week Follow Up    Here for ET    Interval History:    Patient presents today for 12 week follow up appointment.  She was taking Hydrea 500 mg BID and Agrylin 1 mg daily since 2/9/23. In 9/2023, we decreased Agrylin to 0.5 mg daily d/t anemia.  On 4/17/24, we dropped the weekend doses of Agrylin so that she would take 0.5 mg daily M-F  "only. She was mildly anemic before she even started Hydrea in 3/2022. Her WBC is decreased but stable.  She denies any signs or symptoms of infection.  She denies any fever, chills, sweats.  No chest pain or shortness of breath.  No bleeding.   No headaches or changes in vision. No neurologic deficit. She is tearful today. Reports that she feels stressed and angry lately. She is raising two grandchildren , ages 16 & 17. She takes Lexapro 10 mg daily. She has been on this dose for "years".    Review of Systems   Constitutional:  Positive for malaise/fatigue.         Histories:  PMH/PSH/FH/SOCIAL/ALLERGIES AND MEDS REVIEWED AND UPDATED AS APPROPRIATE       Vitals:    10/09/24 1300   BP: (!) 159/88   BP Location: Left arm   Patient Position: Sitting   Pulse: 68   Resp: 18   Temp: 97.9 °F (36.6 °C)   TempSrc: Oral   SpO2: 98%   Weight: 74.4 kg (164 lb)   Height: 5' 6" (1.676 m)              Physical Exam  Vitals and nursing note reviewed.   Constitutional:       General: She is not in acute distress.     Appearance: Normal appearance. She is well-developed.   HENT:      Head: Normocephalic and atraumatic.      Mouth/Throat:      Mouth: Mucous membranes are moist.   Eyes:      General: No scleral icterus.     Extraocular Movements: Extraocular movements intact.      Conjunctiva/sclera: Conjunctivae normal.      Pupils: Pupils are equal, round, and reactive to light.   Neck:      Vascular: No JVD.   Cardiovascular:      Rate and Rhythm: Normal rate and regular rhythm.      Heart sounds: No murmur heard.  Pulmonary:      Effort: Pulmonary effort is normal.      Breath sounds: Normal breath sounds. No wheezing or rhonchi.   Chest:      Chest wall: No deformity or tenderness.   Breasts:     Right: No swelling, mass, nipple discharge, skin change or tenderness.      Left: No swelling, mass, nipple discharge, skin change or tenderness.   Abdominal:      General: Bowel sounds are normal. There is no distension.      Palpations: " Abdomen is soft. There is no splenomegaly or mass.      Tenderness: There is no abdominal tenderness.   Musculoskeletal:         General: No swelling or deformity.      Cervical back: Neck supple.      Right lower leg: Edema present.      Left lower leg: Edema present.   Lymphadenopathy:      Cervical: No cervical adenopathy.      Upper Body:      Right upper body: No supraclavicular or axillary adenopathy.      Left upper body: No supraclavicular or axillary adenopathy.      Lower Body: No right inguinal adenopathy. No left inguinal adenopathy.   Skin:     General: Skin is warm.      Coloration: Skin is not jaundiced.      Findings: No lesion or rash.      Nails: There is no clubbing.   Neurological:      General: No focal deficit present.      Mental Status: She is alert and oriented to person, place, and time.      Sensory: Sensation is intact.      Motor: Motor function is intact.      Gait: Gait is intact.   Psychiatric:         Attention and Perception: Attention normal.         Mood and Affect: Mood and affect normal.         Speech: Speech normal.         Behavior: Behavior is cooperative.         Thought Content: Thought content normal.         Cognition and Memory: Cognition normal.         Judgment: Judgment normal.       ECOG SCORE             Laboratory:  CBC with Differential:  Lab Results   Component Value Date    WBC 3.20 (L) 10/09/2024    RBC 2.56 (L) 10/09/2024    HGB 10.6 (L) 10/09/2024    HCT 31.0 (L) 10/09/2024    .1 (H) 10/09/2024    MCH 41.4 (H) 10/09/2024    MCHC 34.2 10/09/2024    RDW 12.0 10/09/2024     (H) 10/09/2024    MPV 8.8 10/09/2024       Latest Reference Range & Units 05/23/22 08:40 06/09/22 07:37 06/23/22 11:27 07/07/22 07:27 07/21/22 11:18 08/04/22 08:10 08/18/22 10:52   WBC 4.5 - 11.5 x10(3)/mcL 2.6 (L) 3.3 (L) 4.0 (L) 3.1 (L) 3.4 (L) 3.5 (L) 4.1 (L)   (L): Data is abnormally low     Reference Range & Units 05/23/22 08:40 06/09/22 07:37 06/23/22 11:27 07/07/22 07:27  07/21/22 11:18 08/04/22 08:10 08/18/22 10:52   Platelets 130 - 400 x10(3)/mcL 449 (H) 557 (H) 612 (H) 623 (H) 646 (H) 673 (H) 590 (H)   (H): Data is abnormally high   Reference Range & Units 05/23/22 08:40 06/09/22 07:37 06/23/22 11:27 07/07/22 07:27 07/21/22 11:18 08/04/22 08:10 08/18/22 10:52   Hemoglobin 12.0 - 16.0 gm/dL 10.5 (L) 10.6 (L) 10.8 (L) 10.9 (L) 11.0 (L) 10.8 (L) 11.3 (L)   (L): Data is abnormally low    CMP:  Sodium   Date Value Ref Range Status   07/17/2024 143 136 - 145 mmol/L Final     Potassium   Date Value Ref Range Status   07/17/2024 4.8 3.5 - 5.1 mmol/L Final     Chloride   Date Value Ref Range Status   07/17/2024 105 98 - 107 mmol/L Final     CO2   Date Value Ref Range Status   07/17/2024 28 23 - 31 mmol/L Final     Blood Urea Nitrogen   Date Value Ref Range Status   07/17/2024 15.1 9.8 - 20.1 mg/dL Final     Creatinine   Date Value Ref Range Status   07/17/2024 0.72 0.55 - 1.02 mg/dL Final     Calcium   Date Value Ref Range Status   07/17/2024 9.7 8.4 - 10.2 mg/dL Final     Albumin   Date Value Ref Range Status   07/17/2024 4.3 3.4 - 4.8 g/dL Final     Bilirubin Total   Date Value Ref Range Status   07/17/2024 0.5 <=1.5 mg/dL Final     ALP   Date Value Ref Range Status   07/17/2024 69 40 - 150 unit/L Final     AST   Date Value Ref Range Status   07/17/2024 19 5 - 34 unit/L Final     ALT   Date Value Ref Range Status   07/17/2024 11 0 - 55 unit/L Final     Estimated GFR-Non    Date Value Ref Range Status   08/04/2022 >60 mls/min/1.73/m2 Final         Assessment:         1. Essential thrombocytosis    2. Therapeutic drug monitoring    3. Drug-induced immunodeficiency        Essential thrombocytosis -- CALR mutation positive, 52 bp deletion Exon 9   --on Hydrea and Agrylin  Anemia-due to therapy  Leukopenia-due to therapy       Plan:        Contine Hydrea 500 mg BID and Agrylin 0.5 mg daily M-F only. She will HOLD Agrylin on Sat/Sun.       Cont ASA 81 mg daily       Increase  Lexapro to 20 mg daily.        RTC in 3 months with CBC, CMP same day    Encouraged to call for any questions or problems  The patient was given ample opportunity to ask questions and they were all answered to satisfaction; patient demonstrated understanding of what we discussed and is agreeable to the plan.    GORDON Collazo

## 2024-10-14 DIAGNOSIS — D47.3 ESSENTIAL THROMBOCYTOSIS: ICD-10-CM

## 2024-10-14 RX ORDER — ANAGRELIDE 0.5 MG/1
0.5 CAPSULE ORAL
Qty: 30 CAPSULE | Refills: 5 | Status: SHIPPED | OUTPATIENT
Start: 2024-10-14

## 2024-11-23 DIAGNOSIS — D47.3 ESSENTIAL THROMBOCYTOSIS: ICD-10-CM

## 2024-11-25 RX ORDER — HYDROXYUREA 500 MG/1
500 CAPSULE ORAL 2 TIMES DAILY
Qty: 60 CAPSULE | Refills: 2 | Status: SHIPPED | OUTPATIENT
Start: 2024-11-25

## 2025-02-06 ENCOUNTER — LAB VISIT (OUTPATIENT)
Dept: LAB | Facility: HOSPITAL | Age: 75
End: 2025-02-06
Attending: NURSE PRACTITIONER
Payer: MEDICARE

## 2025-02-06 ENCOUNTER — OFFICE VISIT (OUTPATIENT)
Dept: HEMATOLOGY/ONCOLOGY | Facility: CLINIC | Age: 75
End: 2025-02-06
Payer: MEDICARE

## 2025-02-06 VITALS
WEIGHT: 164.38 LBS | SYSTOLIC BLOOD PRESSURE: 149 MMHG | HEIGHT: 66 IN | TEMPERATURE: 98 F | BODY MASS INDEX: 26.42 KG/M2 | OXYGEN SATURATION: 99 % | HEART RATE: 64 BPM | RESPIRATION RATE: 18 BRPM | DIASTOLIC BLOOD PRESSURE: 79 MMHG

## 2025-02-06 DIAGNOSIS — Z51.81 THERAPEUTIC DRUG MONITORING: ICD-10-CM

## 2025-02-06 DIAGNOSIS — D47.3 ESSENTIAL THROMBOCYTOSIS: Primary | ICD-10-CM

## 2025-02-06 DIAGNOSIS — D64.9 ANEMIA, UNSPECIFIED TYPE: ICD-10-CM

## 2025-02-06 DIAGNOSIS — D47.3 ESSENTIAL THROMBOCYTOSIS: ICD-10-CM

## 2025-02-06 DIAGNOSIS — D72.819 LEUKOPENIA, UNSPECIFIED TYPE: ICD-10-CM

## 2025-02-06 DIAGNOSIS — F32.A DEPRESSION, UNSPECIFIED DEPRESSION TYPE: ICD-10-CM

## 2025-02-06 LAB
ALBUMIN SERPL-MCNC: 4.1 G/DL (ref 3.4–4.8)
ALBUMIN/GLOB SERPL: 1.3 RATIO (ref 1.1–2)
ALP SERPL-CCNC: 67 UNIT/L (ref 40–150)
ALT SERPL-CCNC: 14 UNIT/L (ref 0–55)
ANION GAP SERPL CALC-SCNC: 8 MEQ/L
AST SERPL-CCNC: 24 UNIT/L (ref 5–34)
BASOPHILS # BLD AUTO: 0.04 X10(3)/MCL
BASOPHILS NFR BLD AUTO: 1.5 %
BILIRUB SERPL-MCNC: 0.5 MG/DL
BUN SERPL-MCNC: 16.1 MG/DL (ref 9.8–20.1)
CALCIUM SERPL-MCNC: 9.2 MG/DL (ref 8.4–10.2)
CHLORIDE SERPL-SCNC: 107 MMOL/L (ref 98–107)
CO2 SERPL-SCNC: 26 MMOL/L (ref 23–31)
CREAT SERPL-MCNC: 0.65 MG/DL (ref 0.55–1.02)
CREAT/UREA NIT SERPL: 25
EOSINOPHIL # BLD AUTO: 0.01 X10(3)/MCL (ref 0–0.9)
EOSINOPHIL NFR BLD AUTO: 0.4 %
ERYTHROCYTE [DISTWIDTH] IN BLOOD BY AUTOMATED COUNT: 13 % (ref 11.5–17)
GFR SERPLBLD CREATININE-BSD FMLA CKD-EPI: >60 ML/MIN/1.73/M2
GLOBULIN SER-MCNC: 3.1 GM/DL (ref 2.4–3.5)
GLUCOSE SERPL-MCNC: 99 MG/DL (ref 82–115)
HCT VFR BLD AUTO: 31.6 % (ref 37–47)
HGB BLD-MCNC: 10.7 G/DL (ref 12–16)
IMM GRANULOCYTES # BLD AUTO: 0 X10(3)/MCL (ref 0–0.04)
IMM GRANULOCYTES NFR BLD AUTO: 0 %
LDH SERPL-CCNC: 428 U/L (ref 125–220)
LYMPHOCYTES # BLD AUTO: 0.63 X10(3)/MCL (ref 0.6–4.6)
LYMPHOCYTES NFR BLD AUTO: 23 %
MCH RBC QN AUTO: 39.6 PG (ref 27–31)
MCHC RBC AUTO-ENTMCNC: 33.9 G/DL (ref 33–36)
MCV RBC AUTO: 117 FL (ref 80–94)
MONOCYTES # BLD AUTO: 0.36 X10(3)/MCL (ref 0.1–1.3)
MONOCYTES NFR BLD AUTO: 13.1 %
NEUTROPHILS # BLD AUTO: 1.7 X10(3)/MCL (ref 2.1–9.2)
NEUTROPHILS NFR BLD AUTO: 62 %
PLATELET # BLD AUTO: 478 X10(3)/MCL (ref 130–400)
PMV BLD AUTO: 8.7 FL (ref 7.4–10.4)
POTASSIUM SERPL-SCNC: 4.3 MMOL/L (ref 3.5–5.1)
PROT SERPL-MCNC: 7.2 GM/DL (ref 5.8–7.6)
RBC # BLD AUTO: 2.7 X10(6)/MCL (ref 4.2–5.4)
SODIUM SERPL-SCNC: 141 MMOL/L (ref 136–145)
WBC # BLD AUTO: 2.74 X10(3)/MCL (ref 4.5–11.5)

## 2025-02-06 PROCEDURE — 1159F MED LIST DOCD IN RCRD: CPT | Mod: CPTII,S$GLB,, | Performed by: NURSE PRACTITIONER

## 2025-02-06 PROCEDURE — 3008F BODY MASS INDEX DOCD: CPT | Mod: CPTII,S$GLB,, | Performed by: NURSE PRACTITIONER

## 2025-02-06 PROCEDURE — 80053 COMPREHEN METABOLIC PANEL: CPT

## 2025-02-06 PROCEDURE — 1126F AMNT PAIN NOTED NONE PRSNT: CPT | Mod: CPTII,S$GLB,, | Performed by: NURSE PRACTITIONER

## 2025-02-06 PROCEDURE — 4010F ACE/ARB THERAPY RXD/TAKEN: CPT | Mod: CPTII,S$GLB,, | Performed by: NURSE PRACTITIONER

## 2025-02-06 PROCEDURE — 3077F SYST BP >= 140 MM HG: CPT | Mod: CPTII,S$GLB,, | Performed by: NURSE PRACTITIONER

## 2025-02-06 PROCEDURE — 1160F RVW MEDS BY RX/DR IN RCRD: CPT | Mod: CPTII,S$GLB,, | Performed by: NURSE PRACTITIONER

## 2025-02-06 PROCEDURE — 99999 PR PBB SHADOW E&M-EST. PATIENT-LVL IV: CPT | Mod: PBBFAC,,, | Performed by: NURSE PRACTITIONER

## 2025-02-06 PROCEDURE — 99214 OFFICE O/P EST MOD 30 MIN: CPT | Mod: S$GLB,,, | Performed by: NURSE PRACTITIONER

## 2025-02-06 PROCEDURE — 36415 COLL VENOUS BLD VENIPUNCTURE: CPT

## 2025-02-06 PROCEDURE — 85025 COMPLETE CBC W/AUTO DIFF WBC: CPT

## 2025-02-06 PROCEDURE — 3288F FALL RISK ASSESSMENT DOCD: CPT | Mod: CPTII,S$GLB,, | Performed by: NURSE PRACTITIONER

## 2025-02-06 PROCEDURE — 3078F DIAST BP <80 MM HG: CPT | Mod: CPTII,S$GLB,, | Performed by: NURSE PRACTITIONER

## 2025-02-06 PROCEDURE — 1101F PT FALLS ASSESS-DOCD LE1/YR: CPT | Mod: CPTII,S$GLB,, | Performed by: NURSE PRACTITIONER

## 2025-02-06 PROCEDURE — 83615 LACTATE (LD) (LDH) ENZYME: CPT

## 2025-02-06 NOTE — PROGRESS NOTES
HEMATOLOGY/ONCOLOGY OFFICE CLINIC VISIT    Visit Information:  Visit type:  On-going care, Review/discussion of tests, Scheduled follow-up.    Accompanied by:  No one.    Source of history:  Self.    Referral source:  Roc GIFFORD, Isaac HUANG    History limitation:  None.        Initial Consultation: 3/28/2022  Referring Physician: Dr Brian  Other Physicians:  Code Status: Not addressed    Diagnosis/Problem list:   Essential Thrombocytosis  --CALR mutation analysis, exon 9: Positive.    Present Treatment:  Hydrea 500 mg BID + Agrylin 0.5 mg daily on 9/12/23    Treatment history:    Hydrea 500 mg Daily + Agrylin 1 mg daily (2mg on Sat/Sun only)  Hydrea 500 mg BID + Agrylin 1 mg daily (2/9/23)    Imaging:      Pathology:  March 21, 2022 15:21 Peripheral blood, CALR mutation analysis, exon 9: Positive. 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 Peripheral blood, JAK2 V617F mutation analysis: Negative for JAK2 V617F.    CLINICAL HISTORY:       Patient: 74 years old female kindly referred for thrombocytosis.     Reviewing electronic on medical record it seems like her platelets were normal up to March 2018.  In March 2019 platelets were mildly elevated 496,009 slowly there were trending up with the most recent on 3/15/2022 of 1,012,000.  She is taking baby aspirin.    March 21, 2022 15:21 Peripheral blood, CALR mutation analysis, exon 9: Positive. 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  Peripheral blood, JAK2 V617F mutation analysis: Negative for JAK2 V617F.    She denies any family history of blood disorders or malignancies.        Chief Complaint: 13 Week Follow Up    Here for ET    Interval History:    Patient presents today for 12 week follow up appointment.  She was taking Hydrea 500 mg BID and Agrylin 1 mg daily since 2/9/23. In 9/2023, we decreased Agrylin to 0.5 mg daily d/t anemia.  On 4/17/24, we dropped the weekend doses of Agrylin so that she would take 0.5 mg daily M-F  "only. She was mildly anemic before she even started Hydrea in 3/2022. Her WBC is decreased but stable.  She denies any signs or symptoms of infection.  She denies any fever, chills, sweats.  No chest pain or shortness of breath.  No bleeding.   No headaches or changes in vision. No neurologic deficit. She is tearful today. Reports that she feels stressed and angry lately. She is raising two grandchildren , ages 16 & 17. She takes Lexapro 10 mg daily. She has been on this dose for "years".    Interval history  2/06/2025:  On today's visit the patient presents to the office for follow-up and management of her essential thrombocytosis.  The patient is doing well and denies any major significant complaints.  On specific questioning the patient denies any B symptoms.  Signs of thrombosis, drenching night sweats, tingling, numbness, pain in the extremities.  She does have fatigue the patient is compliant with medication including hydroxyurea, Agrylin, aspirin.      Review of Systems   Constitutional:  Positive for malaise/fatigue.     Histories:  PMH/PSH/FH/SOCIAL/ALLERGIES AND MEDS REVIEWED AND UPDATED AS APPROPRIATE     Vitals:    02/06/25 0925   BP: (!) 149/79   BP Location: Left arm   Patient Position: Sitting   Pulse: 64   Resp: 18   Temp: 97.8 °F (36.6 °C)   TempSrc: Oral   SpO2: 99%   Weight: 74.6 kg (164 lb 6.4 oz)   Height: 5' 6" (1.676 m)        Physical Exam  Vitals and nursing note reviewed.   Constitutional:       General: She is not in acute distress.     Appearance: Normal appearance. She is well-developed.   HENT:      Head: Normocephalic and atraumatic.      Mouth/Throat:      Mouth: Mucous membranes are moist.   Eyes:      General: No scleral icterus.     Extraocular Movements: Extraocular movements intact.      Conjunctiva/sclera: Conjunctivae normal.      Pupils: Pupils are equal, round, and reactive to light.   Neck:      Vascular: No JVD.   Cardiovascular:      Rate and Rhythm: Normal rate and " regular rhythm.      Heart sounds: No murmur heard.  Pulmonary:      Effort: Pulmonary effort is normal.      Breath sounds: Normal breath sounds. No wheezing or rhonchi.   Chest:      Chest wall: No deformity or tenderness.   Breasts:     Right: No swelling, mass, nipple discharge, skin change or tenderness.      Left: No swelling, mass, nipple discharge, skin change or tenderness.   Abdominal:      General: Bowel sounds are normal. There is no distension.      Palpations: Abdomen is soft. There is no splenomegaly or mass.      Tenderness: There is no abdominal tenderness.   Musculoskeletal:         General: No swelling or deformity.      Cervical back: Neck supple.      Right lower leg: Edema present.      Left lower leg: Edema present.   Lymphadenopathy:      Cervical: No cervical adenopathy.      Upper Body:      Right upper body: No supraclavicular or axillary adenopathy.      Left upper body: No supraclavicular or axillary adenopathy.      Lower Body: No right inguinal adenopathy. No left inguinal adenopathy.   Skin:     General: Skin is warm.      Coloration: Skin is not jaundiced.      Findings: No lesion or rash.      Nails: There is no clubbing.   Neurological:      General: No focal deficit present.      Mental Status: She is alert and oriented to person, place, and time.      Sensory: Sensation is intact.      Motor: Motor function is intact.      Gait: Gait is intact.   Psychiatric:         Attention and Perception: Attention normal.         Mood and Affect: Mood and affect normal.         Speech: Speech normal.         Behavior: Behavior is cooperative.         Thought Content: Thought content normal.         Cognition and Memory: Cognition normal.         Judgment: Judgment normal.       ECOG SCORE             Laboratory:  CBC with Differential:  Lab Results   Component Value Date    WBC 2.74 (L) 02/06/2025    RBC 2.70 (L) 02/06/2025    HGB 10.7 (L) 02/06/2025    HCT 31.6 (L) 02/06/2025    .0  (H) 02/06/2025    MCH 39.6 (H) 02/06/2025    MCHC 33.9 02/06/2025    RDW 13.0 02/06/2025     (H) 02/06/2025    MPV 8.7 02/06/2025       Latest Reference Range & Units 05/23/22 08:40 06/09/22 07:37 06/23/22 11:27 07/07/22 07:27 07/21/22 11:18 08/04/22 08:10 08/18/22 10:52   WBC 4.5 - 11.5 x10(3)/mcL 2.6 (L) 3.3 (L) 4.0 (L) 3.1 (L) 3.4 (L) 3.5 (L) 4.1 (L)   (L): Data is abnormally low     Reference Range & Units 05/23/22 08:40 06/09/22 07:37 06/23/22 11:27 07/07/22 07:27 07/21/22 11:18 08/04/22 08:10 08/18/22 10:52   Platelets 130 - 400 x10(3)/mcL 449 (H) 557 (H) 612 (H) 623 (H) 646 (H) 673 (H) 590 (H)   (H): Data is abnormally high   Reference Range & Units 05/23/22 08:40 06/09/22 07:37 06/23/22 11:27 07/07/22 07:27 07/21/22 11:18 08/04/22 08:10 08/18/22 10:52   Hemoglobin 12.0 - 16.0 gm/dL 10.5 (L) 10.6 (L) 10.8 (L) 10.9 (L) 11.0 (L) 10.8 (L) 11.3 (L)   (L): Data is abnormally low    CMP:  Sodium   Date Value Ref Range Status   10/09/2024 142 136 - 145 mmol/L Final     Potassium   Date Value Ref Range Status   10/09/2024 4.7 3.5 - 5.1 mmol/L Final     Chloride   Date Value Ref Range Status   10/09/2024 108 (H) 98 - 107 mmol/L Final     CO2   Date Value Ref Range Status   10/09/2024 26 23 - 31 mmol/L Final     Blood Urea Nitrogen   Date Value Ref Range Status   10/09/2024 17.5 9.8 - 20.1 mg/dL Final     Creatinine   Date Value Ref Range Status   10/09/2024 0.66 0.55 - 1.02 mg/dL Final     Calcium   Date Value Ref Range Status   10/09/2024 9.3 8.4 - 10.2 mg/dL Final     Albumin   Date Value Ref Range Status   10/09/2024 4.2 3.4 - 4.8 g/dL Final     Bilirubin Total   Date Value Ref Range Status   10/09/2024 0.4 <=1.5 mg/dL Final     ALP   Date Value Ref Range Status   10/09/2024 68 40 - 150 unit/L Final     AST   Date Value Ref Range Status   10/09/2024 17 5 - 34 unit/L Final     ALT   Date Value Ref Range Status   10/09/2024 10 0 - 55 unit/L Final     Estimated GFR-Non    Date Value Ref Range  Status   08/04/2022 >60 mls/min/1.73/m2 Final         Assessment:         1. Essential thrombocytosis    2. Leukopenia, unspecified type    3. Anemia, unspecified type    4. Therapeutic drug monitoring    5. Depression, unspecified depression type          Essential thrombocytosis -- CALR mutation positive, 52 bp deletion Exon 9   --on Hydrea and Agrylin  Anemia-due to therapy  Leukopenia-due to therapy       Plan:     Dx:  Essential thrombocytosis CALR positive,  52 bp deletion Exon 9   Status:  Ongoing  Patient is currently being treated with hydroxyurea 500 mg b.i.d. and Agrylin 0.5 mg daily Monday through Friday, she holds Shruthi on Saturday and Sunday.  She continues aspirin 81 mg daily  I reviewed laboratory evaluation.  I discussed with the patient that the goals of therapy here are prevention of thrombosis, management of bleeding breasts, and symptom control such as headaches, lightheadedness and acral paresthesias which the patient denies currently speaking to the success of treatment.  We will continue to see the patient in follow-up every 3 month with laboratory evaluation the same day of the visit including CBC, CMP, LDH.  Dx:  Anxiety/depression  Status:  Ongoing  Patient Lexapro was increased to 20 mg daily during the last visit three-month ago.  On today's visit the patient reports*  Dx:  Anemia  Status:  Ongoing  Related to treatment  Stable  We will continue to monitor  Dx:  Leukopenia  Status:  Ongoing  Related to treatment  Stable  We will continue to monitor  I discussed with the patient mild risk for infections and reviewed infection prevention:  -Wash your hands frequently  -Stay at home as much as possible.  -Stay away from people who are obviously sick  -Wash fruits and vegetables well before you eat them  -Call the office immediately if you develop a fever or signs of infection.  -Cook well everything you eat.           Patient instructions and visit orders.       -Follow-up:  F/U with NP-  5/6/2025   -Labs:  CBC, CMP, LDH- 5/6/2025   -Imaging:    -Other orders:      34 were spent on this encounter    Chiki Hamm, MSN, FNP-BC, APRN, AOCNP   Department of Hematology/Oncology.

## 2025-02-27 DIAGNOSIS — D47.3 ESSENTIAL THROMBOCYTOSIS: ICD-10-CM

## 2025-02-27 RX ORDER — HYDROXYUREA 500 MG/1
500 CAPSULE ORAL 2 TIMES DAILY
Qty: 60 CAPSULE | Refills: 3 | Status: SHIPPED | OUTPATIENT
Start: 2025-02-27

## 2025-03-20 DIAGNOSIS — M50.30 DDD (DEGENERATIVE DISC DISEASE), CERVICAL: Primary | ICD-10-CM

## 2025-03-31 ENCOUNTER — TELEPHONE (OUTPATIENT)
Dept: NEUROSURGERY | Facility: CLINIC | Age: 75
End: 2025-03-31
Payer: MEDICARE

## 2025-03-31 DIAGNOSIS — M54.2 NECK PAIN: Primary | ICD-10-CM

## 2025-03-31 NOTE — TELEPHONE ENCOUNTER
Patient referred to Dr. Herrera by Jami Burgos, NP for cervical DDD.     MRI Cervical Spine 3/17/25. Impression:  Relatively diffuse moderate cervical spine disc disease.  Advanced left facet arthropathy at C2-C3 and C4-C5.  Moderate discogenic endplate edema at C5-C6.  C2-C3 in C6-C7 mild left intervertebral foraminal stenosis.  No significant spinal canal stenosis.    Per referring 2/27/25 progress note:  Patient presents with complaints of upper left-sided neck pain times one-month. No injury or trauma. She has discomfort with range of motion and at bedtime. She does put salon Pas patches and takes Tylenol which improves the discomfort. Her hematologist is only allowing her to take NSAIDs for 2 days only if needed for aches and pains. No numbness/tingling of upper extremities, headaches.     Patient confirmed details listed above. Currently in PT at [facility] on [address]. Denies additional imaging or spine surgeries. Denies bowel or bladder incontinence.     Patient scheduled with JK 4/9/25, xray scheduled prior. Records requested from [facility].

## 2025-04-02 NOTE — PROGRESS NOTES
[facility]  History & Physical  Neurosurgery      Aimee Oseguera   [id number]   1950       SUBJECTIVE:     CHIEF COMPLAINT:  Left-sided neck and head pain    HPI:  Aimee Oseguera is a 75 y.o. female who presents for neurosurgical evaluation at the recommendation of Jami Burgos NP. The patient presents today describing solution of previous left occipital pain with pain and numbness radiating behind the left ear and up the head as well as pain radiating into left side of the neck.  The patient states her symptoms have resolved with participation in outpatient physical therapy.  She states initially she was having a very difficult time rotating her neck although has no limitations currently.  She is denying any numbness weakness or radicular symptoms.  She is denying any issues with balance or changes in bowel or bladder function.  She is resting very well at night.  She is rating her pain a 0/10 today.       Past Medical History:   Diagnosis Date    Depression     Essential thrombocytosis     HTN (hypertension)     Hypercholesterolemia        Past Surgical History:   Procedure Laterality Date    BASAL CELL CARCINOMA EXCISION  03/2018    nose    COLONOSCOPY  09/30/2019    Luis Enrique Seay MD, repeat 10 years    high ligation and local ligatuion of varicose veins         Family History   Problem Relation Name Age of Onset    Hypertension Mother      Lung cancer Mother  85    Heart attack Father  39    Sudden death Sister  22        drowning    Dementia Brother  75    Heart attack Brother      Stroke Brother  55    Hypertension Brother      Transient ischemic attack Brother         Social History     Socioeconomic History    Marital status:     Number of children: 4   Occupational History    Occupation: Retired Homemaker   Tobacco Use    Smoking status: Never    Smokeless tobacco: Never   Substance and Sexual Activity    Alcohol use: Not Currently    Drug use: Never    Sexual  "activity: Not Currently        Review of patient's allergies indicates:   Allergen Reactions    Ciprofloxacin      Other reaction(s): Unknown        Current Outpatient Medications   Medication Instructions    amLODIPine (NORVASC) 10 mg, Oral, Daily    anagrelide (AGRYLIN) 0.5 mg, Oral    aspirin (ECOTRIN) 81 MG EC tablet Daily    atorvastatin (LIPITOR) 20 mg, Oral, Daily    benazepriL (LOTENSIN) 40 mg, Oral, Daily    EScitalopram oxalate (LEXAPRO) 20 mg, Oral, Daily    hydroxyurea (HYDREA) 500 mg, Oral, 2 times daily    triamcinolone acetonide 0.1% (KENALOG) 0.1 % cream 2 times daily PRN          Review of Systems   Constitutional:  Negative for chills, fever and weight loss.   HENT:  Negative for congestion, hearing loss, nosebleeds and tinnitus.    Eyes:  Negative for blurred vision, double vision and photophobia.   Respiratory:  Negative for cough, shortness of breath and wheezing.    Cardiovascular:  Negative for chest pain, palpitations and leg swelling.   Gastrointestinal:  Negative for constipation, diarrhea, nausea and vomiting.   Genitourinary:  Negative for dysuria, frequency and urgency.   Musculoskeletal:  Negative for back pain, falls and neck pain.   Skin:  Negative for itching and rash.   Neurological:  Negative for dizziness, tingling, tremors, sensory change, speech change, seizures, loss of consciousness and headaches.   Psychiatric/Behavioral:  Negative for depression, hallucinations and memory loss. The patient is not nervous/anxious.        OBJECTIVE:     Visit Vitals  /72 (BP Location: Left arm, Patient Position: Sitting)   Pulse 71   Resp 16   Ht 5' 7" (1.702 m)   Wt 75.8 kg (167 lb)   BMI 26.16 kg/m²        Physical Exam    General:  Pleasant, Well-nourished, Well-groomed.    Cardiovascular:  Neck is supple    Lungs:  Breathing is quiet, non-lablored    Abdomen:  Soft, non-tender, non-distended.    Neurological:  Muscle strength against resistance:   Right Left   Deltoid (C5) 5/5 5/5 "   Biceps (C5/6) 5/5 5/5   Wrist Flexors (C5/6) 5/5 5/5   Triceps (C7) 5/5 5/5   Wrist extension (C7) 5/5 5/5   Finger abduction (C8) 5/5 5/5    5/5 5/5        Hip abduction 5/5 5/5   Hip adduction 5/5 5/5   Hip flexion (L2) 5/5 5/5   Knee extension (L3) 5/5 5/5   Knee flexion (L4) 5/5 5/5   Dorsiflexion (L5) 5/5 5/5   EHL (L5) 5/5 5/5   Plantar flexion (S1) 5/5 5/5   Sensation is intact to primary modalities in bilateral upper and lower extremities.  No limitations with cervical range of motion  No tenderness to palpation of the occipital nerve bilaterally    Reflexes:   Right Left   Triceps (C7) 2+ 2+   Biceps (C5) 2+ 2+   Brachioradialis (C6) 2+ 2+   Patellar (L4) 2+ 2+   Achilles (S1) 2+ 2+   Negative Tinel's, Clonus and Clayton bilaterally.  Gait is normal  Coordination is normal.  No tremor noted.    Imaging:  All pertinent neuroimaging independently reviewed. Discussed these findings in detail with the patient.    X-rays of the cervical spine dated 4/9/2025 with flexion-extension views only reveals slight retrolisthesis at C3-4 with mild disc space narrowing at C3-4, C4-5, C5-6 and C6-7.  Slight anterolisthesis at C2-3 and C4-5 on extension with correction of retrolisthesis at C3-4 in this position.  Slight exaggeration of retrolisthesis at C3-4 and extension is noted as well.    X-rays of the cervical spine dated 2/27/2025 reveal retrolisthesis at C3-4 and slightly at C5-6 with multilevel degenerative changes to the posterior elements    MRI of the cervical spine dated 3/17/2025 reveals moderate disc space narrowing from C3-C7 with multilevel facet hypertrophy and uncovertebral spondylosis.  C2-3 with left facet hypertrophy, mild left foraminal stenosis and no significant canal or right foraminal stenosis.  C3-4 with disc osteophyte complex and mild uncovertebral hypertrophy.  C4-5 with mild bilateral facet hypertrophy, disc osteophyte complex and no significant canal or foraminal stenosis.  C5-6 with  disc osteophyte complex and no significant canal or foraminal stenosis.  C6-7 with disc osteophyte complex, uncovertebral hypertrophy more pronounced on the left resulting in mild left foraminal stenosis.  C7-T1 is largely unremarkable.    ASSESSMENT:       ICD-10-CM ICD-9-CM   1. Occipital neuralgia of left side  M54.81 723.8   2. DDD (degenerative disc disease), cervical  M50.30 722.4     PLAN:     1. Occipital neuralgia of left side (Primary)    2. DDD (degenerative disc disease), cervical  - Ambulatory referral/consult to Neurosurgery    Aimee MCCORMICK Oumar presents today reporting improvement of her previous neck and occipital pain.  I did take the time to review the patient's recent x-rays as well as MRI with her in clinic today.  We did discuss the degenerative changes noted on these images although I did advise the patient I feel that her symptoms were likely more related to occipital neuralgia.  Thankfully her symptoms have improved at this time.  She was encouraged to continue on with physical therapy once weekly as well as home exercises and stretches daily.  We will plan to see the patient back in clinic on an as-needed basis going forward.  Should you have any concerns or regression in his symptoms in the future she was advised to notify our office.  This plan was discussed with the patient and she is in agreement to move forward..    Follow up if symptoms worsen or fail to improve.      E/M Level Based On Time:   15 minutes spent on reviewing chart, which includes interpreting lab results and diagnostic tests.   15 minutes spent in the room with the patient performing a history and physical exam, counseling or educating the patient/caregiver, prescribing medications, ordering labwork/diagnostic tests, or placing referrals.   0 minutes spent collaborating plan of care with physician.   5 minutes spent documenting all relevant clinical informationin the electronic health record.     Total Time Spent: 35  ZAHRAA Levine  [facility]  Neurosurgery [facility]      Disclaimer:  This note is prepared using voice recognition software and as such is likely to have errors despite attempts at proofreading. Please contact me for questions.

## 2025-04-09 ENCOUNTER — HOSPITAL ENCOUNTER (OUTPATIENT)
Dept: RADIOLOGY | Facility: HOSPITAL | Age: 75
Discharge: HOME | End: 2025-04-09
Attending: STUDENT IN AN ORGANIZED HEALTH CARE EDUCATION/TRAINING PROGRAM
Payer: MEDICARE

## 2025-04-09 ENCOUNTER — OFFICE VISIT (OUTPATIENT)
Dept: NEUROSURGERY | Facility: CLINIC | Age: 75
End: 2025-04-09
Payer: MEDICARE

## 2025-04-09 VITALS
WEIGHT: 167 LBS | BODY MASS INDEX: 26.21 KG/M2 | SYSTOLIC BLOOD PRESSURE: 127 MMHG | HEART RATE: 71 BPM | DIASTOLIC BLOOD PRESSURE: 72 MMHG | HEIGHT: 67 IN | RESPIRATION RATE: 16 BRPM

## 2025-04-09 DIAGNOSIS — M54.81 OCCIPITAL NEURALGIA OF LEFT SIDE: Primary | ICD-10-CM

## 2025-04-09 DIAGNOSIS — M54.2 NECK PAIN: ICD-10-CM

## 2025-04-09 DIAGNOSIS — M50.30 DDD (DEGENERATIVE DISC DISEASE), CERVICAL: ICD-10-CM

## 2025-04-09 PROCEDURE — 3074F SYST BP LT 130 MM HG: CPT | Mod: CPTII,,, | Performed by: NURSE PRACTITIONER

## 2025-04-09 PROCEDURE — 72040 X-RAY EXAM NECK SPINE 2-3 VW: CPT | Mod: TC

## 2025-04-09 PROCEDURE — 1159F MED LIST DOCD IN RCRD: CPT | Mod: CPTII,,, | Performed by: NURSE PRACTITIONER

## 2025-04-09 PROCEDURE — 99203 OFFICE O/P NEW LOW 30 MIN: CPT | Mod: ,,, | Performed by: NURSE PRACTITIONER

## 2025-04-09 PROCEDURE — 3078F DIAST BP <80 MM HG: CPT | Mod: CPTII,,, | Performed by: NURSE PRACTITIONER

## 2025-04-09 PROCEDURE — 4010F ACE/ARB THERAPY RXD/TAKEN: CPT | Mod: CPTII,,, | Performed by: NURSE PRACTITIONER

## 2025-04-09 PROCEDURE — 1101F PT FALLS ASSESS-DOCD LE1/YR: CPT | Mod: CPTII,,, | Performed by: NURSE PRACTITIONER

## 2025-04-09 PROCEDURE — 3288F FALL RISK ASSESSMENT DOCD: CPT | Mod: CPTII,,, | Performed by: NURSE PRACTITIONER

## 2025-04-09 PROCEDURE — 1126F AMNT PAIN NOTED NONE PRSNT: CPT | Mod: CPTII,,, | Performed by: NURSE PRACTITIONER

## 2025-04-09 PROCEDURE — 1160F RVW MEDS BY RX/DR IN RCRD: CPT | Mod: CPTII,,, | Performed by: NURSE PRACTITIONER

## 2025-04-09 PROCEDURE — 1157F ADVNC CARE PLAN IN RCRD: CPT | Mod: CPTII,,, | Performed by: NURSE PRACTITIONER

## 2025-04-09 PROCEDURE — 3008F BODY MASS INDEX DOCD: CPT | Mod: CPTII,,, | Performed by: NURSE PRACTITIONER

## 2025-04-09 RX ORDER — TRIAMCINOLONE ACETONIDE 1 MG/G
CREAM TOPICAL 2 TIMES DAILY PRN
COMMUNITY
Start: 2025-03-24

## 2025-05-02 DIAGNOSIS — F32.A DEPRESSION, UNSPECIFIED DEPRESSION TYPE: Primary | ICD-10-CM

## 2025-05-02 RX ORDER — ESCITALOPRAM OXALATE 20 MG/1
20 TABLET ORAL DAILY
Qty: 90 TABLET | Refills: 0 | Status: SHIPPED | OUTPATIENT
Start: 2025-05-02

## 2025-05-09 DIAGNOSIS — D47.3 ESSENTIAL THROMBOCYTOSIS: Primary | ICD-10-CM

## 2025-05-15 ENCOUNTER — LAB VISIT (OUTPATIENT)
Dept: LAB | Facility: HOSPITAL | Age: 75
End: 2025-05-15
Attending: INTERNAL MEDICINE
Payer: MEDICARE

## 2025-05-15 ENCOUNTER — OFFICE VISIT (OUTPATIENT)
Dept: HEMATOLOGY/ONCOLOGY | Facility: CLINIC | Age: 75
End: 2025-05-15
Payer: MEDICARE

## 2025-05-15 VITALS
WEIGHT: 171.31 LBS | OXYGEN SATURATION: 99 % | BODY MASS INDEX: 26.89 KG/M2 | RESPIRATION RATE: 18 BRPM | HEART RATE: 69 BPM | DIASTOLIC BLOOD PRESSURE: 81 MMHG | HEIGHT: 67 IN | SYSTOLIC BLOOD PRESSURE: 132 MMHG | TEMPERATURE: 97 F

## 2025-05-15 DIAGNOSIS — D64.9 ANEMIA, UNSPECIFIED TYPE: ICD-10-CM

## 2025-05-15 DIAGNOSIS — L97.909 ULCER OF LOWER EXTREMITY, UNSPECIFIED LATERALITY, UNSPECIFIED ULCER STAGE: ICD-10-CM

## 2025-05-15 DIAGNOSIS — D72.819 LEUKOPENIA, UNSPECIFIED TYPE: ICD-10-CM

## 2025-05-15 DIAGNOSIS — D47.3 ESSENTIAL THROMBOCYTOSIS: ICD-10-CM

## 2025-05-15 DIAGNOSIS — Z51.81 THERAPEUTIC DRUG MONITORING: ICD-10-CM

## 2025-05-15 DIAGNOSIS — Z79.899 DRUG-INDUCED IMMUNODEFICIENCY: ICD-10-CM

## 2025-05-15 DIAGNOSIS — D47.3 ESSENTIAL THROMBOCYTOSIS: Primary | ICD-10-CM

## 2025-05-15 DIAGNOSIS — D84.821 DRUG-INDUCED IMMUNODEFICIENCY: ICD-10-CM

## 2025-05-15 LAB
ALBUMIN SERPL-MCNC: 3.8 G/DL (ref 3.4–4.8)
ALBUMIN/GLOB SERPL: 1.1 RATIO (ref 1.1–2)
ALP SERPL-CCNC: 73 UNIT/L (ref 40–150)
ALT SERPL-CCNC: 11 UNIT/L (ref 0–55)
ANION GAP SERPL CALC-SCNC: 8 MEQ/L
AST SERPL-CCNC: 19 UNIT/L (ref 11–45)
BASOPHILS # BLD AUTO: 0.06 X10(3)/MCL
BASOPHILS NFR BLD AUTO: 1.8 %
BILIRUB SERPL-MCNC: 0.5 MG/DL
BUN SERPL-MCNC: 20.7 MG/DL (ref 9.8–20.1)
CALCIUM SERPL-MCNC: 8.9 MG/DL (ref 8.4–10.2)
CHLORIDE SERPL-SCNC: 109 MMOL/L (ref 98–107)
CO2 SERPL-SCNC: 26 MMOL/L (ref 23–31)
CREAT SERPL-MCNC: 0.7 MG/DL (ref 0.55–1.02)
CREAT/UREA NIT SERPL: 30
EOSINOPHIL # BLD AUTO: 0.03 X10(3)/MCL (ref 0–0.9)
EOSINOPHIL NFR BLD AUTO: 0.9 %
ERYTHROCYTE [DISTWIDTH] IN BLOOD BY AUTOMATED COUNT: 11.9 % (ref 11.5–17)
GFR SERPLBLD CREATININE-BSD FMLA CKD-EPI: >60 ML/MIN/1.73/M2
GLOBULIN SER-MCNC: 3.4 GM/DL (ref 2.4–3.5)
GLUCOSE SERPL-MCNC: 85 MG/DL (ref 82–115)
HCT VFR BLD AUTO: 30.5 % (ref 37–47)
HGB BLD-MCNC: 10.3 G/DL (ref 12–16)
IMM GRANULOCYTES # BLD AUTO: 0.01 X10(3)/MCL (ref 0–0.04)
IMM GRANULOCYTES NFR BLD AUTO: 0.3 %
LDH SERPL-CCNC: 458 U/L (ref 125–220)
LYMPHOCYTES # BLD AUTO: 0.62 X10(3)/MCL (ref 0.6–4.6)
LYMPHOCYTES NFR BLD AUTO: 18.7 %
MCH RBC QN AUTO: 39.9 PG (ref 27–31)
MCHC RBC AUTO-ENTMCNC: 33.8 G/DL (ref 33–36)
MCV RBC AUTO: 118.2 FL (ref 80–94)
MONOCYTES # BLD AUTO: 0.36 X10(3)/MCL (ref 0.1–1.3)
MONOCYTES NFR BLD AUTO: 10.9 %
NEUTROPHILS # BLD AUTO: 2.23 X10(3)/MCL (ref 2.1–9.2)
NEUTROPHILS NFR BLD AUTO: 67.4 %
PLATELET # BLD AUTO: 645 X10(3)/MCL (ref 130–400)
PMV BLD AUTO: 9.3 FL (ref 7.4–10.4)
POTASSIUM SERPL-SCNC: 4.4 MMOL/L (ref 3.5–5.1)
PROT SERPL-MCNC: 7.2 GM/DL (ref 5.8–7.6)
RBC # BLD AUTO: 2.58 X10(6)/MCL (ref 4.2–5.4)
SODIUM SERPL-SCNC: 143 MMOL/L (ref 136–145)
WBC # BLD AUTO: 3.31 X10(3)/MCL (ref 4.5–11.5)

## 2025-05-15 PROCEDURE — 80053 COMPREHEN METABOLIC PANEL: CPT

## 2025-05-15 PROCEDURE — 99999 PR PBB SHADOW E&M-EST. PATIENT-LVL III: CPT | Mod: PBBFAC,,, | Performed by: NURSE PRACTITIONER

## 2025-05-15 PROCEDURE — 83615 LACTATE (LD) (LDH) ENZYME: CPT

## 2025-05-15 PROCEDURE — 85025 COMPLETE CBC W/AUTO DIFF WBC: CPT

## 2025-05-15 PROCEDURE — 36415 COLL VENOUS BLD VENIPUNCTURE: CPT

## 2025-05-15 NOTE — PROGRESS NOTES
HEMATOLOGY/ONCOLOGY OFFICE CLINIC VISIT    Visit Information:  Visit type:  On-going care, Review/discussion of tests, Scheduled follow-up.    Accompanied by:  No one.    Source of history:  Self.    Referral source:  Roc GIFFORD, Isaac HUANG    History limitation:  None.        Initial Consultation: 3/28/2022  Referring Physician: Dr Brian  Other Physicians:  Code Status: Not addressed    Diagnosis/Problem list:   Essential Thrombocytosis  --CALR mutation analysis, exon 9: Positive.    Present Treatment:  Hydrea 500 mg BID + Agrylin 0.5 mg daily on 9/12/23    Treatment history:    Hydrea 500 mg Daily + Agrylin 1 mg daily (2mg on Sat/Sun only)  Hydrea 500 mg BID + Agrylin 1 mg daily (2/9/23)    Imaging:      Pathology:  March 21, 2022 15:21 Peripheral blood, CALR mutation analysis, exon 9: Positive. 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 Peripheral blood, JAK2 V617F mutation analysis: Negative for JAK2 V617F.    CLINICAL HISTORY:       Patient: 74 years old female kindly referred for thrombocytosis.     Reviewing electronic on medical record it seems like her platelets were normal up to March 2018.  In March 2019 platelets were mildly elevated 496,009 slowly there were trending up with the most recent on 3/15/2022 of 1,012,000.  She is taking baby aspirin.    March 21, 2022 15:21 Peripheral blood, CALR mutation analysis, exon 9: Positive. 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  Peripheral blood, JAK2 V617F mutation analysis: Negative for JAK2 V617F.    She denies any family history of blood disorders or malignancies.        Chief Complaint: No chief complaint on file.    Here for ET    Interval History:    Patient presents today for 12 week follow up appointment.  She was taking Hydrea 500 mg BID and Agrylin 1 mg daily since 2/9/23. In 9/2023, we decreased Agrylin to 0.5 mg daily d/t anemia.  On 4/17/24, we dropped the weekend doses of Agrylin so that she would take 0.5 mg  "daily M-F only. She was mildly anemic before she even started Hydrea in 3/2022. Her WBC is decreased but stable.  She denies any signs or symptoms of infection.  She denies any fever, chills, sweats.  No chest pain or shortness of breath.  No bleeding.   No headaches or changes in vision. No neurologic deficit. She is tearful today. Reports that she feels stressed and angry lately. She is raising two grandchildren , ages 16 & 17. She takes Lexapro 10 mg daily. She has been on this dose for "years".    Interval history  2/06/2025:  On today's visit the patient presents to the office for follow-up and management of her essential thrombocytosis.  The patient is doing well and denies any major significant complaints.  On specific questioning the patient denies any B symptoms.  Signs of thrombosis, drenching night sweats, tingling, numbness, pain in the extremities.  She does have fatigue the patient is compliant with medication including hydroxyurea, Agrylin, aspirin.    Interval history  5/15/2025:    Aimee Oseguera presents for follow-up of essential thrombocythemia.     Currently experiencing sore throat attributed to viral illness or allergies. Also reports leg ulcer that developed a few months ago, causing pain and leakage.    Denies fever, infections, or other acute symptoms.    Taking Hydrea twice daily, Agrylin Monday through Friday, and baby aspirin daily. Reports difficulty with fluid intake as water consumption causes nausea.    Seen by Dr. Red (dermatologist) for leg ulcer. Dr. Red does not suspect cancer and attributes ulcer to vascular issues.    Since the last visit:  -February 7, 2025 the patient was seen and evaluated by family Medicine for wellness exam.    -February 27, 2025 hydroxyurea was refilled.    -February 27, 2025 patient was seen and evaluated by family Medicine for neck pain.  She was prescribed Robaxin 500 mg.    -March 6, 2025 bone density study performed compared to 02/17/2023 " which showed osteopenia  -March 24, 2025 patient presented for bilateral screening mammogram at [facility].  BI-RADS 0 need additional imaging evaluation.  There was a small focal asymmetry in the inner central right breast which required further evaluation with right diagnostic mammogram to include through lateral and spot compressions  -April 9, 2025:  Patient was seen and evaluated by Neurosurgery for occipital neuralgia left side and degenerative disc disease  -April 9, 2025  patient underwent x-ray cervical spine flexion and extension showing:Mild motion at C3-C4 and C4-C5.  She was instructed to notify neurosurgical team if symptoms worsen, at the time of the visit they were improving.  -April 29, 2025 additional views mammogram was performed with BI-RADS 2 benign finding the question right breast focal asymmetry does not persist on additional evaluation.  -05/02/2025 escitalopram prescription was refilled        Review of Systems   Constitutional:  Positive for malaise/fatigue.   Skin:         UlcerLLE     Histories:  PMH/PSH/FH/SOCIAL/ALLERGIES AND MEDS REVIEWED AND UPDATED AS APPROPRIATE     There were no vitals filed for this visit.       Physical Exam  Vitals and nursing note reviewed.   Constitutional:       General: She is not in acute distress.     Appearance: Normal appearance. She is well-developed.   HENT:      Head: Normocephalic and atraumatic.      Mouth/Throat:      Mouth: Mucous membranes are moist.   Eyes:      General: No scleral icterus.     Extraocular Movements: Extraocular movements intact.      Conjunctiva/sclera: Conjunctivae normal.      Pupils: Pupils are equal, round, and reactive to light.   Neck:      Vascular: No JVD.   Cardiovascular:      Rate and Rhythm: Normal rate and regular rhythm.      Heart sounds: No murmur heard.  Pulmonary:      Effort: Pulmonary effort is normal.      Breath sounds: Normal breath sounds. No wheezing or rhonchi.   Chest:      Chest wall: No  deformity or tenderness.   Breasts:     Right: No swelling, mass, nipple discharge, skin change or tenderness.      Left: No swelling, mass, nipple discharge, skin change or tenderness.   Abdominal:      General: Bowel sounds are normal. There is no distension.      Palpations: Abdomen is soft. There is no splenomegaly or mass.      Tenderness: There is no abdominal tenderness.   Musculoskeletal:         General: No swelling or deformity.      Cervical back: Neck supple.      Right lower leg: Edema present.      Left lower leg: Edema present.   Lymphadenopathy:      Cervical: No cervical adenopathy.      Upper Body:      Right upper body: No supraclavicular or axillary adenopathy.      Left upper body: No supraclavicular or axillary adenopathy.      Lower Body: No right inguinal adenopathy. No left inguinal adenopathy.   Skin:     General: Skin is warm.      Coloration: Skin is not jaundiced.      Findings: Lesion (LLE leg ulcer above the ankle size of a dime, open, oozing serous fluid.) present. No rash.      Nails: There is no clubbing.   Neurological:      General: No focal deficit present.      Mental Status: She is alert and oriented to person, place, and time.      Sensory: Sensation is intact.      Motor: Motor function is intact.      Gait: Gait is intact.   Psychiatric:         Attention and Perception: Attention normal.         Mood and Affect: Mood and affect normal.         Speech: Speech normal.         Behavior: Behavior is cooperative.         Thought Content: Thought content normal.         Cognition and Memory: Cognition normal.         Judgment: Judgment normal.       ECOG SCORE             Laboratory:  CBC with Differential:  Lab Results   Component Value Date    WBC 2.74 (L) 02/06/2025    RBC 2.70 (L) 02/06/2025    HGB 10.7 (L) 02/06/2025    HCT 31.6 (L) 02/06/2025    .0 (H) 02/06/2025    MCH 39.6 (H) 02/06/2025    MCHC 33.9 02/06/2025    RDW 13.0 02/06/2025     (H) 02/06/2025     MPV 8.7 02/06/2025       Latest Reference Range & Units 05/23/22 08:40 06/09/22 07:37 06/23/22 11:27 07/07/22 07:27 07/21/22 11:18 08/04/22 08:10 08/18/22 10:52   WBC 4.5 - 11.5 x10(3)/mcL 2.6 (L) 3.3 (L) 4.0 (L) 3.1 (L) 3.4 (L) 3.5 (L) 4.1 (L)   (L): Data is abnormally low     Reference Range & Units 05/23/22 08:40 06/09/22 07:37 06/23/22 11:27 07/07/22 07:27 07/21/22 11:18 08/04/22 08:10 08/18/22 10:52   Platelets 130 - 400 x10(3)/mcL 449 (H) 557 (H) 612 (H) 623 (H) 646 (H) 673 (H) 590 (H)   (H): Data is abnormally high   Reference Range & Units 05/23/22 08:40 06/09/22 07:37 06/23/22 11:27 07/07/22 07:27 07/21/22 11:18 08/04/22 08:10 08/18/22 10:52   Hemoglobin 12.0 - 16.0 gm/dL 10.5 (L) 10.6 (L) 10.8 (L) 10.9 (L) 11.0 (L) 10.8 (L) 11.3 (L)   (L): Data is abnormally low    CMP:  Sodium   Date Value Ref Range Status   02/06/2025 141 136 - 145 mmol/L Final     Potassium   Date Value Ref Range Status   02/06/2025 4.3 3.5 - 5.1 mmol/L Final     Chloride   Date Value Ref Range Status   02/06/2025 107 98 - 107 mmol/L Final     CO2   Date Value Ref Range Status   02/06/2025 26 23 - 31 mmol/L Final     Glucose   Date Value Ref Range Status   02/06/2025 99 82 - 115 mg/dL Final     Blood Urea Nitrogen   Date Value Ref Range Status   02/06/2025 16.1 9.8 - 20.1 mg/dL Final     Creatinine   Date Value Ref Range Status   02/06/2025 0.65 0.55 - 1.02 mg/dL Final     Calcium   Date Value Ref Range Status   02/06/2025 9.2 8.4 - 10.2 mg/dL Final     Protein Total   Date Value Ref Range Status   02/06/2025 7.2 5.8 - 7.6 gm/dL Final     Albumin   Date Value Ref Range Status   02/06/2025 4.1 3.4 - 4.8 g/dL Final     Bilirubin Total   Date Value Ref Range Status   02/06/2025 0.5 <=1.5 mg/dL Final     ALP   Date Value Ref Range Status   02/06/2025 67 40 - 150 unit/L Final     AST   Date Value Ref Range Status   02/06/2025 24 5 - 34 unit/L Final     ALT   Date Value Ref Range Status   02/06/2025 14 0 - 55 unit/L Final     Estimated  GFR-Non    Date Value Ref Range Status   08/04/2022 >60 mls/min/1.73/m2 Final         Assessment:         No diagnosis found.        Essential thrombocytosis -- CALR mutation positive, 52 bp deletion Exon 9   --on Hydrea and Agrylin  Anemia-due to therapy  Leukopenia-due to therapy       Plan:     Dx:  Essential thrombocytosis CALR positive,  52 bp deletion Exon 9   Status:  Slightly worsened with platelet count 645 (previously 478)   WBC improved to 3.3 from 2.4 (normal is 4.0).   Hemoglobin stable at 10.3. Platelet count elevated at 645 (normal range 140-440).   Elevation likely related to inflammation from leg ulcer.   Discontinue Hydrea completely due to risk of delayed healing and potential gangrene with leg ulcer.   Increase Agrylin to 0.5 mg TID daily including weekends.   Continue baby aspirin daily.   Encourage increased fluid intake with flavored options.   Return in 2 weeks for lab work and follow-up.   Contact dermatologist to request earlier appointment for leg ulcer evaluation currently schedule for 1 month  Dx:  Anxiety/depression  Status:  Ongoing  Patient Lexapro was increased to 20 mg daily during the last visit three-month ago.    Dx:  Anemia  Status:  Ongoing  Related to treatment  Stable  We will continue to monitor  Dx:  Leukopenia  Status:  Ongoing  Related to treatment  Stable  We will continue to monitor  I discussed with the patient mild risk for infections and reviewed infection prevention:  -Wash your hands frequently  -Stay at home as much as possible.  -Stay away from people who are obviously sick  -Wash fruits and vegetables well before you eat them  -Call the office immediately if you develop a fever or signs of infection.  -Cook well everything you eat.           Patient instructions and visit orders.       -Follow-up:  F/U with NP ( chiki ) 2 weeks  -Labs:  CBC, CMP, LDH- 2 weeks with OV  -Imaging:    -Other orders:      42 were spent on this encounter    Chiki CARUSO  Noris, MSN, FNP-BC, APRN, AOCNP   Department of Hematology/Oncology.

## 2025-05-30 DIAGNOSIS — D47.3 ESSENTIAL THROMBOCYTOSIS: ICD-10-CM

## 2025-05-30 RX ORDER — ANAGRELIDE 0.5 MG/1
CAPSULE ORAL
Qty: 90 CAPSULE | Refills: 2 | Status: SHIPPED | OUTPATIENT
Start: 2025-05-30

## 2025-06-03 ENCOUNTER — LAB VISIT (OUTPATIENT)
Dept: LAB | Facility: HOSPITAL | Age: 75
End: 2025-06-03
Attending: INTERNAL MEDICINE
Payer: MEDICARE

## 2025-06-03 ENCOUNTER — OFFICE VISIT (OUTPATIENT)
Dept: HEMATOLOGY/ONCOLOGY | Facility: CLINIC | Age: 75
End: 2025-06-03
Payer: MEDICARE

## 2025-06-03 VITALS
WEIGHT: 171.31 LBS | HEART RATE: 63 BPM | HEIGHT: 67 IN | RESPIRATION RATE: 18 BRPM | BODY MASS INDEX: 26.89 KG/M2 | OXYGEN SATURATION: 100 % | TEMPERATURE: 98 F | DIASTOLIC BLOOD PRESSURE: 75 MMHG | SYSTOLIC BLOOD PRESSURE: 127 MMHG

## 2025-06-03 DIAGNOSIS — D47.3 ESSENTIAL THROMBOCYTOSIS: ICD-10-CM

## 2025-06-03 DIAGNOSIS — D72.819 LEUKOPENIA, UNSPECIFIED TYPE: ICD-10-CM

## 2025-06-03 DIAGNOSIS — D64.9 ANEMIA, UNSPECIFIED TYPE: ICD-10-CM

## 2025-06-03 DIAGNOSIS — D47.3 ESSENTIAL THROMBOCYTOSIS: Primary | ICD-10-CM

## 2025-06-03 DIAGNOSIS — L97.929 SKIN ULCER OF LEFT LOWER LEG, UNSPECIFIED ULCER STAGE: ICD-10-CM

## 2025-06-03 DIAGNOSIS — Z51.81 THERAPEUTIC DRUG MONITORING: ICD-10-CM

## 2025-06-03 LAB
ALBUMIN SERPL-MCNC: 3.8 G/DL (ref 3.4–4.8)
ALBUMIN/GLOB SERPL: 1 RATIO (ref 1.1–2)
ALP SERPL-CCNC: 73 UNIT/L (ref 40–150)
ALT SERPL-CCNC: 11 UNIT/L (ref 0–55)
ANION GAP SERPL CALC-SCNC: 9 MEQ/L
AST SERPL-CCNC: 26 UNIT/L (ref 11–45)
BASOPHILS # BLD AUTO: 0.07 X10(3)/MCL
BASOPHILS NFR BLD AUTO: 1.5 %
BILIRUB SERPL-MCNC: 0.3 MG/DL
BUN SERPL-MCNC: 22.3 MG/DL (ref 9.8–20.1)
CALCIUM SERPL-MCNC: 9.2 MG/DL (ref 8.4–10.2)
CHLORIDE SERPL-SCNC: 107 MMOL/L (ref 98–107)
CO2 SERPL-SCNC: 23 MMOL/L (ref 23–31)
CREAT SERPL-MCNC: 0.72 MG/DL (ref 0.55–1.02)
CREAT/UREA NIT SERPL: 31
EOSINOPHIL # BLD AUTO: 0.04 X10(3)/MCL (ref 0–0.9)
EOSINOPHIL NFR BLD AUTO: 0.8 %
ERYTHROCYTE [DISTWIDTH] IN BLOOD BY AUTOMATED COUNT: 11.3 % (ref 11.5–17)
GFR SERPLBLD CREATININE-BSD FMLA CKD-EPI: >60 ML/MIN/1.73/M2
GLOBULIN SER-MCNC: 3.7 GM/DL (ref 2.4–3.5)
GLUCOSE SERPL-MCNC: 93 MG/DL (ref 82–115)
HCT VFR BLD AUTO: 31.9 % (ref 37–47)
HGB BLD-MCNC: 10.7 G/DL (ref 12–16)
IMM GRANULOCYTES # BLD AUTO: 0.01 X10(3)/MCL (ref 0–0.04)
IMM GRANULOCYTES NFR BLD AUTO: 0.2 %
LDH SERPL-CCNC: 639 U/L (ref 125–220)
LYMPHOCYTES # BLD AUTO: 1.01 X10(3)/MCL (ref 0.6–4.6)
LYMPHOCYTES NFR BLD AUTO: 21 %
MCH RBC QN AUTO: 38.4 PG (ref 27–31)
MCHC RBC AUTO-ENTMCNC: 33.5 G/DL (ref 33–36)
MCV RBC AUTO: 114.3 FL (ref 80–94)
MONOCYTES # BLD AUTO: 0.45 X10(3)/MCL (ref 0.1–1.3)
MONOCYTES NFR BLD AUTO: 9.3 %
NEUTROPHILS # BLD AUTO: 3.24 X10(3)/MCL (ref 2.1–9.2)
NEUTROPHILS NFR BLD AUTO: 67.2 %
PLATELET # BLD AUTO: 817 X10(3)/MCL (ref 130–400)
PMV BLD AUTO: 9.1 FL (ref 7.4–10.4)
POTASSIUM SERPL-SCNC: 4.7 MMOL/L (ref 3.5–5.1)
PROT SERPL-MCNC: 7.5 GM/DL (ref 5.8–7.6)
RBC # BLD AUTO: 2.79 X10(6)/MCL (ref 4.2–5.4)
SODIUM SERPL-SCNC: 139 MMOL/L (ref 136–145)
WBC # BLD AUTO: 4.82 X10(3)/MCL (ref 4.5–11.5)

## 2025-06-03 PROCEDURE — 1157F ADVNC CARE PLAN IN RCRD: CPT | Mod: CPTII,S$GLB,, | Performed by: NURSE PRACTITIONER

## 2025-06-03 PROCEDURE — 36415 COLL VENOUS BLD VENIPUNCTURE: CPT

## 2025-06-03 PROCEDURE — 3074F SYST BP LT 130 MM HG: CPT | Mod: CPTII,S$GLB,, | Performed by: NURSE PRACTITIONER

## 2025-06-03 PROCEDURE — 3078F DIAST BP <80 MM HG: CPT | Mod: CPTII,S$GLB,, | Performed by: NURSE PRACTITIONER

## 2025-06-03 PROCEDURE — 1159F MED LIST DOCD IN RCRD: CPT | Mod: CPTII,S$GLB,, | Performed by: NURSE PRACTITIONER

## 2025-06-03 PROCEDURE — 3288F FALL RISK ASSESSMENT DOCD: CPT | Mod: CPTII,S$GLB,, | Performed by: NURSE PRACTITIONER

## 2025-06-03 PROCEDURE — 4010F ACE/ARB THERAPY RXD/TAKEN: CPT | Mod: CPTII,S$GLB,, | Performed by: NURSE PRACTITIONER

## 2025-06-03 PROCEDURE — 3008F BODY MASS INDEX DOCD: CPT | Mod: CPTII,S$GLB,, | Performed by: NURSE PRACTITIONER

## 2025-06-03 PROCEDURE — 99215 OFFICE O/P EST HI 40 MIN: CPT | Mod: S$GLB,,, | Performed by: NURSE PRACTITIONER

## 2025-06-03 PROCEDURE — 99999 PR PBB SHADOW E&M-EST. PATIENT-LVL IV: CPT | Mod: PBBFAC,,, | Performed by: NURSE PRACTITIONER

## 2025-06-03 PROCEDURE — 83615 LACTATE (LD) (LDH) ENZYME: CPT

## 2025-06-03 PROCEDURE — 1125F AMNT PAIN NOTED PAIN PRSNT: CPT | Mod: CPTII,S$GLB,, | Performed by: NURSE PRACTITIONER

## 2025-06-03 PROCEDURE — 80053 COMPREHEN METABOLIC PANEL: CPT

## 2025-06-03 PROCEDURE — 85025 COMPLETE CBC W/AUTO DIFF WBC: CPT

## 2025-06-03 PROCEDURE — 1160F RVW MEDS BY RX/DR IN RCRD: CPT | Mod: CPTII,S$GLB,, | Performed by: NURSE PRACTITIONER

## 2025-06-03 PROCEDURE — 1101F PT FALLS ASSESS-DOCD LE1/YR: CPT | Mod: CPTII,S$GLB,, | Performed by: NURSE PRACTITIONER

## 2025-06-19 ENCOUNTER — OFFICE VISIT (OUTPATIENT)
Dept: HEMATOLOGY/ONCOLOGY | Facility: CLINIC | Age: 75
End: 2025-06-19
Payer: MEDICARE

## 2025-06-19 ENCOUNTER — LAB VISIT (OUTPATIENT)
Dept: LAB | Facility: HOSPITAL | Age: 75
End: 2025-06-19
Attending: INTERNAL MEDICINE
Payer: MEDICARE

## 2025-06-19 VITALS
HEART RATE: 69 BPM | TEMPERATURE: 98 F | RESPIRATION RATE: 18 BRPM | OXYGEN SATURATION: 98 % | HEIGHT: 67 IN | BODY MASS INDEX: 27.28 KG/M2 | SYSTOLIC BLOOD PRESSURE: 146 MMHG | DIASTOLIC BLOOD PRESSURE: 72 MMHG | WEIGHT: 173.81 LBS

## 2025-06-19 DIAGNOSIS — D47.3 ESSENTIAL THROMBOCYTOSIS: Primary | ICD-10-CM

## 2025-06-19 DIAGNOSIS — D47.3 ESSENTIAL THROMBOCYTOSIS: ICD-10-CM

## 2025-06-19 LAB
ALBUMIN SERPL-MCNC: 3.7 G/DL (ref 3.4–4.8)
ALBUMIN/GLOB SERPL: 1 RATIO (ref 1.1–2)
ALP SERPL-CCNC: 74 UNIT/L (ref 40–150)
ALT SERPL-CCNC: 11 UNIT/L (ref 0–55)
ANION GAP SERPL CALC-SCNC: 3 MEQ/L
AST SERPL-CCNC: 21 UNIT/L (ref 11–45)
BASOPHILS # BLD AUTO: 0.05 X10(3)/MCL
BASOPHILS NFR BLD AUTO: 1.3 %
BILIRUB SERPL-MCNC: 0.3 MG/DL
BUN SERPL-MCNC: 18.6 MG/DL (ref 9.8–20.1)
CALCIUM SERPL-MCNC: 9.2 MG/DL (ref 8.4–10.2)
CHLORIDE SERPL-SCNC: 108 MMOL/L (ref 98–107)
CO2 SERPL-SCNC: 28 MMOL/L (ref 23–31)
CREAT SERPL-MCNC: 0.67 MG/DL (ref 0.55–1.02)
CREAT/UREA NIT SERPL: 28
EOSINOPHIL # BLD AUTO: 0.05 X10(3)/MCL (ref 0–0.9)
EOSINOPHIL NFR BLD AUTO: 1.3 %
ERYTHROCYTE [DISTWIDTH] IN BLOOD BY AUTOMATED COUNT: 11.5 % (ref 11.5–17)
GFR SERPLBLD CREATININE-BSD FMLA CKD-EPI: >60 ML/MIN/1.73/M2
GLOBULIN SER-MCNC: 3.7 GM/DL (ref 2.4–3.5)
GLUCOSE SERPL-MCNC: 93 MG/DL (ref 82–115)
HCT VFR BLD AUTO: 31.3 % (ref 37–47)
HGB BLD-MCNC: 10.4 G/DL (ref 12–16)
IMM GRANULOCYTES # BLD AUTO: 0.01 X10(3)/MCL (ref 0–0.04)
IMM GRANULOCYTES NFR BLD AUTO: 0.3 %
LDH SERPL-CCNC: 463 U/L (ref 125–220)
LYMPHOCYTES # BLD AUTO: 0.77 X10(3)/MCL (ref 0.6–4.6)
LYMPHOCYTES NFR BLD AUTO: 20 %
MCH RBC QN AUTO: 36.2 PG (ref 27–31)
MCHC RBC AUTO-ENTMCNC: 33.2 G/DL (ref 33–36)
MCV RBC AUTO: 109.1 FL (ref 80–94)
MONOCYTES # BLD AUTO: 0.36 X10(3)/MCL (ref 0.1–1.3)
MONOCYTES NFR BLD AUTO: 9.4 %
NEUTROPHILS # BLD AUTO: 2.61 X10(3)/MCL (ref 2.1–9.2)
NEUTROPHILS NFR BLD AUTO: 67.7 %
PLATELET # BLD AUTO: 629 X10(3)/MCL (ref 130–400)
PMV BLD AUTO: 9 FL (ref 7.4–10.4)
POTASSIUM SERPL-SCNC: 4.8 MMOL/L (ref 3.5–5.1)
PROT SERPL-MCNC: 7.4 GM/DL (ref 5.8–7.6)
RBC # BLD AUTO: 2.87 X10(6)/MCL (ref 4.2–5.4)
SODIUM SERPL-SCNC: 139 MMOL/L (ref 136–145)
WBC # BLD AUTO: 3.85 X10(3)/MCL (ref 4.5–11.5)

## 2025-06-19 PROCEDURE — 36415 COLL VENOUS BLD VENIPUNCTURE: CPT

## 2025-06-19 PROCEDURE — 99999 PR PBB SHADOW E&M-EST. PATIENT-LVL IV: CPT | Mod: PBBFAC,,, | Performed by: NURSE PRACTITIONER

## 2025-06-19 PROCEDURE — 80053 COMPREHEN METABOLIC PANEL: CPT

## 2025-06-19 PROCEDURE — 85025 COMPLETE CBC W/AUTO DIFF WBC: CPT

## 2025-06-19 PROCEDURE — 83615 LACTATE (LD) (LDH) ENZYME: CPT

## 2025-06-19 NOTE — PROGRESS NOTES
HEMATOLOGY/ONCOLOGY OFFICE CLINIC VISIT    Visit Information:  Visit type:  On-going care, Review/discussion of tests, Scheduled follow-up.    Accompanied by:  No one.    Source of history:  Self.    Referral source:  Roc GIFFORD, Isaac HUANG    History limitation:  None.        Initial Consultation: 3/28/2022  Referring Physician: Dr Brian  Other Physicians:  Code Status: Not addressed    Diagnosis/Problem list:   Essential Thrombocytosis  --CALR mutation analysis, exon 9: Positive.    Present Treatment:  Hydrea 500 mg BID + Agrylin 0.5 mg daily on 9/12/23  Agrylin 1.0 mg daily on 6/3/2025    Treatment history:    Hydrea 500 mg Daily + Agrylin 1 mg daily (2mg on Sat/Sun only)  Hydrea 500 mg BID + Agrylin 1 mg daily (2/9/23)  Hydrea held + Agryling 0.5 mg TID- 5/15/2025 due to leg ulcer. 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 Peripheral blood, JAK2 V617F mutation analysis: Negative for JAK2 V617F.    CLINICAL HISTORY:       Patient: 74 years old female kindly referred for thrombocytosis.     Reviewing electronic on medical record it seems like her platelets were normal up to March 2018.  In March 2019 platelets were mildly elevated 496,009 slowly there were trending up with the most recent on 3/15/2022 of 1,012,000.  She is taking baby aspirin.    March 21, 2022 15:21 Peripheral blood, CALR mutation analysis, exon 9: Positive. 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  Peripheral blood, JAK2 V617F mutation analysis: Negative for JAK2 V617F.    She denies any family history of blood disorders or malignancies.        Chief Complaint: No chief complaint on file.    Here for ET    Interval History:    Patient presents today for 12 week follow up appointment.  She was taking Hydrea 500 mg BID  "and Agrylin 1 mg daily since 2/9/23. In 9/2023, we decreased Agrylin to 0.5 mg daily d/t anemia.  On 4/17/24, we dropped the weekend doses of Agrylin so that she would take 0.5 mg daily M-F only. She was mildly anemic before she even started Hydrea in 3/2022. Her WBC is decreased but stable.  She denies any signs or symptoms of infection.  She denies any fever, chills, sweats.  No chest pain or shortness of breath.  No bleeding.   No headaches or changes in vision. No neurologic deficit. She is tearful today. Reports that she feels stressed and angry lately. She is raising two grandchildren , ages 16 & 17. She takes Lexapro 10 mg daily. She has been on this dose for "years".    Interval history  2/06/2025:  Aimee Oseguera presents for follow-up of essential thrombocythemia. Recent ultrasound of leg veins showed no blood clot but identified leaking veins.  Currently managing essential thrombocythemia with Anagrelide 1mg three times daily. Platelets decreased from 800 to 625. Hemoglobin stable. White blood cell count slightly decreased, not concerning.  Reports scheduled vein procedure on Monday for "leg vein leakage". Additional procedure scheduled for the following week on other leg. Vascular surgeon noted veins leak significantly. No symptoms denied. Current medications include Anagrelide 1 mg three times daily for essential thrombocythemia and daily baby aspirin. Maintaining adequate fluid intake.     Review of Systems   Constitutional:  Positive for malaise/fatigue.   Respiratory:  Negative for cough and shortness of breath.    Cardiovascular:  Negative for chest pain.   Gastrointestinal:  Negative for abdominal pain and diarrhea.   Genitourinary:  Negative for frequency.   Musculoskeletal:  Negative for back pain.   Skin:  Negative for rash.        UlcerLLE   Neurological:  Positive for headaches.   Psychiatric/Behavioral:  The patient is nervous/anxious.      Histories:  PMH/PSH/FH/SOCIAL/ALLERGIES AND MEDS " "REVIEWED AND UPDATED AS APPROPRIATE     Vitals:    06/19/25 1107   BP: (!) 146/72   BP Location: Left arm   Patient Position: Sitting   Pulse: 69   Resp: 18   Temp: 97.8 °F (36.6 °C)   TempSrc: Oral   SpO2: 98%   Weight: 78.8 kg (173 lb 12.8 oz)   Height: 5' 7" (1.702 m)          Physical Exam  Vitals and nursing note reviewed.   Constitutional:       General: She is not in acute distress.     Appearance: Normal appearance. She is well-developed.   HENT:      Head: Normocephalic and atraumatic.      Mouth/Throat:      Mouth: Mucous membranes are moist.   Eyes:      General: No scleral icterus.     Extraocular Movements: Extraocular movements intact.      Conjunctiva/sclera: Conjunctivae normal.      Pupils: Pupils are equal, round, and reactive to light.   Neck:      Vascular: No JVD.   Cardiovascular:      Rate and Rhythm: Normal rate and regular rhythm.      Heart sounds: No murmur heard.  Pulmonary:      Effort: Pulmonary effort is normal.      Breath sounds: Normal breath sounds. No wheezing or rhonchi.   Chest:      Chest wall: No deformity or tenderness.   Breasts:     Right: No swelling, mass, nipple discharge, skin change or tenderness.      Left: No swelling, mass, nipple discharge, skin change or tenderness.   Abdominal:      General: Bowel sounds are normal. There is no distension.      Palpations: Abdomen is soft. There is no splenomegaly or mass.      Tenderness: There is no abdominal tenderness.   Musculoskeletal:         General: No swelling or deformity.      Cervical back: Neck supple.      Right lower leg: Edema present.      Left lower leg: Edema present.   Lymphadenopathy:      Cervical: No cervical adenopathy.      Upper Body:      Right upper body: No supraclavicular or axillary adenopathy.      Left upper body: No supraclavicular or axillary adenopathy.      Lower Body: No right inguinal adenopathy. No left inguinal adenopathy.   Skin:     General: Skin is warm.      Coloration: Skin is not " jaundiced.      Findings: Lesion (LLE leg ulcer above the ankle size of a dime, open, oozing serous fluid.) present. No rash.      Nails: There is no clubbing.   Neurological:      General: No focal deficit present.      Mental Status: She is alert and oriented to person, place, and time.      Sensory: Sensation is intact.      Motor: Motor function is intact.      Gait: Gait is intact.   Psychiatric:         Attention and Perception: Attention normal.         Mood and Affect: Mood and affect normal.         Speech: Speech normal.         Behavior: Behavior is cooperative.         Thought Content: Thought content normal.         Cognition and Memory: Cognition normal.         Judgment: Judgment normal.     ECOG SCORE             Laboratory:  CBC with Differential:  Lab Results   Component Value Date    WBC 3.85 (L) 06/19/2025    RBC 2.87 (L) 06/19/2025    HGB 10.4 (L) 06/19/2025    HCT 31.3 (L) 06/19/2025    .1 (H) 06/19/2025    MCH 36.2 (H) 06/19/2025    MCHC 33.2 06/19/2025    RDW 11.5 06/19/2025     (H) 06/19/2025    MPV 9.0 06/19/2025       Latest Reference Range & Units 05/23/22 08:40 06/09/22 07:37 06/23/22 11:27 07/07/22 07:27 07/21/22 11:18 08/04/22 08:10 08/18/22 10:52   WBC 4.5 - 11.5 x10(3)/mcL 2.6 (L) 3.3 (L) 4.0 (L) 3.1 (L) 3.4 (L) 3.5 (L) 4.1 (L)   (L): Data is abnormally low     Reference Range & Units 05/23/22 08:40 06/09/22 07:37 06/23/22 11:27 07/07/22 07:27 07/21/22 11:18 08/04/22 08:10 08/18/22 10:52   Platelets 130 - 400 x10(3)/mcL 449 (H) 557 (H) 612 (H) 623 (H) 646 (H) 673 (H) 590 (H)   (H): Data is abnormally high   Reference Range & Units 05/23/22 08:40 06/09/22 07:37 06/23/22 11:27 07/07/22 07:27 07/21/22 11:18 08/04/22 08:10 08/18/22 10:52   Hemoglobin 12.0 - 16.0 gm/dL 10.5 (L) 10.6 (L) 10.8 (L) 10.9 (L) 11.0 (L) 10.8 (L) 11.3 (L)   (L): Data is abnormally low    CMP:  Sodium   Date Value Ref Range Status   06/03/2025 139 136 - 145 mmol/L Final     Potassium   Date Value Ref  Range Status   06/03/2025 4.7 3.5 - 5.1 mmol/L Final     Chloride   Date Value Ref Range Status   06/03/2025 107 98 - 107 mmol/L Final     CO2   Date Value Ref Range Status   06/03/2025 23 23 - 31 mmol/L Final     Glucose   Date Value Ref Range Status   06/03/2025 93 82 - 115 mg/dL Final     Blood Urea Nitrogen   Date Value Ref Range Status   06/03/2025 22.3 (H) 9.8 - 20.1 mg/dL Final     Creatinine   Date Value Ref Range Status   06/03/2025 0.72 0.55 - 1.02 mg/dL Final     Calcium   Date Value Ref Range Status   06/03/2025 9.2 8.4 - 10.2 mg/dL Final     Protein Total   Date Value Ref Range Status   06/03/2025 7.5 5.8 - 7.6 gm/dL Final     Albumin   Date Value Ref Range Status   06/03/2025 3.8 3.4 - 4.8 g/dL Final     Bilirubin Total   Date Value Ref Range Status   06/03/2025 0.3 <=1.5 mg/dL Final     ALP   Date Value Ref Range Status   06/03/2025 73 40 - 150 unit/L Final     AST   Date Value Ref Range Status   06/03/2025 26 11 - 45 unit/L Final     ALT   Date Value Ref Range Status   06/03/2025 11 0 - 55 unit/L Final     Estimated GFR-Non    Date Value Ref Range Status   08/04/2022 >60 mls/min/1.73/m2 Final         Assessment:         No diagnosis found.  Essential thrombocytosis -- CALR mutation positive, 52 bp deletion Exon 9   --on Hydrea and Agrylin  Anemia-due to therapy  Leukopenia-due to therapy       Plan:     Dx:  Essential thrombocytosis CALR positive,  52 bp deletion Exon 9   Status:  Slightly worsened with platelet count 645 (previously 478)   - Continue Anagrelide 1mg three times daily with goal of platelets <600.  - Sending prescription for Anagrelide 1mg tablets to simplify dosing to one tablet three times daily.  - Continue daily baby aspirin and maintain hydration.  - Follow-up in two weeks on 07/03/2025.  - Request vascular procedure reports be sent to Dr. Krishnan.  - For a patient with essential thrombocytosis (ET) who is CALR positive, has newly diagnosed skin cancer, and is  intolerant of hydroxyurea (with prior anagrelide exposure), the best next therapy to consider is pegylated interferon maria eugenia. This recommendation is supported by multiple lines of evidence:  - Pegylated interferon maria eugenia is preferred in hydroxyurea-intolerant patients, particularly those who are younger or have a history of or risk for secondary malignancies, due to its lack of genotoxic and leukemogenic effects. This is especially relevant in the context of a new skin cancer diagnosis, where minimizing additional mutagenic risk is prudent.  - CALR-mutated ET responds favorably to interferon, with high rates of hematologic and molecular responses, including significant reduction in CALR mutant allele burden.  - Busulfan is an alternative, generally reserved for older patients or those unable to tolerate interferon, but is less favored in patients with a history of malignancy due to potential long-term safety concerns, despite recent data not showing increased leukemogenicity.  - Anagrelide is less preferred due to cardiovascular toxicity and less robust efficacy in high-risk patients.  - There is no evidence supporting the use of ruxolitinib or other GRZEGORZ inhibitors in this setting.  - The current treatment algorithm for ET highlights pegylated interferon maria eugenia and busulfan as second-line options in hydroxyurea-intolerant patients, with a preference for interferon in younger patients and those with malignancy risk.  Dx:  Anxiety/depression  Status:  Ongoing  Continue Lexapro 20 mg PO q day    Dx:  Anemia  Status:  Ongoing  Related to treatment  Stable  We will continue to monitor  Dx: Squamous Cell Carcinoma, Lower Extremity  Status: Awaiting further evaluation  Scheduled for leg ultrasound in two days.   Follow-up with Dr. Woody on 06/11/2025 for surgical planning.   Will review ultrasound results at next visit.            Patient instructions and visit orders.       -Follow-up:  F/U with NP- 7/3/2025  -Labs:  CBC,  CMP, LDH- 7/3/2025  -Imaging:    -Other orders:      40 were spent on this encounter    Chiki Hamm, MSN, FNP-BC, APRN, AOCNP   Department of Hematology/Oncology.

## 2025-06-20 DIAGNOSIS — D47.3 ESSENTIAL THROMBOCYTOSIS: ICD-10-CM

## 2025-06-23 DIAGNOSIS — D47.3 ESSENTIAL THROMBOCYTOSIS: Primary | ICD-10-CM

## 2025-06-23 RX ORDER — ANAGRELIDE 1 MG/1
CAPSULE ORAL
Qty: 90 CAPSULE | Refills: 0 | Status: SHIPPED | OUTPATIENT
Start: 2025-06-23

## 2025-06-23 RX ORDER — ANAGRELIDE 0.5 MG/1
0.5 CAPSULE ORAL 3 TIMES DAILY
Qty: 90 CAPSULE | Refills: 0 | Status: SHIPPED | OUTPATIENT
Start: 2025-06-23 | End: 2025-06-23

## 2025-06-27 ENCOUNTER — TELEPHONE (OUTPATIENT)
Dept: PHARMACY | Facility: CLINIC | Age: 75
End: 2025-06-27
Payer: MEDICARE

## 2025-06-27 NOTE — TELEPHONE ENCOUNTER
[facility]/Population Health Chart Review & Patient Outreach Details For Medication Adherence Project    Reason for Outreach Encounter: 3rd Party payor non-compliance report ([employer], [employer], [employer], etc)  2.  Patient Outreach Method: Reviewed patient chart   3.   Medication in question:    Hyperlipidemia Medications              atorvastatin (LIPITOR) 20 MG tablet TAKE 1 TABLET (20 MG TOTAL) BY MOUTH ONCE DAILY.                  atorvastatin  last filled  5/15 for 90 day supply      4.  Reviewed and or Updates Made To: Patient Chart  5. Outreach Outcomes and/or actions taken: Patient filled medication and is on track to be adherent  Additional Notes:

## 2025-07-03 ENCOUNTER — OFFICE VISIT (OUTPATIENT)
Dept: HEMATOLOGY/ONCOLOGY | Facility: CLINIC | Age: 75
End: 2025-07-03
Payer: MEDICARE

## 2025-07-03 ENCOUNTER — LAB VISIT (OUTPATIENT)
Dept: LAB | Facility: HOSPITAL | Age: 75
End: 2025-07-03
Attending: INTERNAL MEDICINE
Payer: MEDICARE

## 2025-07-03 VITALS
HEART RATE: 71 BPM | WEIGHT: 172.63 LBS | OXYGEN SATURATION: 98 % | HEIGHT: 67 IN | DIASTOLIC BLOOD PRESSURE: 78 MMHG | RESPIRATION RATE: 18 BRPM | TEMPERATURE: 98 F | BODY MASS INDEX: 27.09 KG/M2 | SYSTOLIC BLOOD PRESSURE: 138 MMHG

## 2025-07-03 DIAGNOSIS — D47.3 ESSENTIAL THROMBOCYTOSIS: ICD-10-CM

## 2025-07-03 DIAGNOSIS — D84.821 DRUG-INDUCED IMMUNODEFICIENCY: ICD-10-CM

## 2025-07-03 DIAGNOSIS — D47.3 ESSENTIAL THROMBOCYTOSIS: Primary | ICD-10-CM

## 2025-07-03 DIAGNOSIS — Z51.81 THERAPEUTIC DRUG MONITORING: ICD-10-CM

## 2025-07-03 DIAGNOSIS — Z79.899 DRUG-INDUCED IMMUNODEFICIENCY: ICD-10-CM

## 2025-07-03 LAB
ALBUMIN SERPL-MCNC: 3.6 G/DL (ref 3.4–4.8)
ALBUMIN/GLOB SERPL: 1.1 RATIO (ref 1.1–2)
ALP SERPL-CCNC: 75 UNIT/L (ref 40–150)
ALT SERPL-CCNC: 9 UNIT/L (ref 0–55)
ANION GAP SERPL CALC-SCNC: 5 MEQ/L
AST SERPL-CCNC: 19 UNIT/L (ref 11–45)
BASOPHILS # BLD AUTO: 0.04 X10(3)/MCL
BASOPHILS NFR BLD AUTO: 1.1 %
BILIRUB SERPL-MCNC: 0.3 MG/DL
BUN SERPL-MCNC: 18.7 MG/DL (ref 9.8–20.1)
CALCIUM SERPL-MCNC: 9 MG/DL (ref 8.4–10.2)
CHLORIDE SERPL-SCNC: 110 MMOL/L (ref 98–107)
CO2 SERPL-SCNC: 28 MMOL/L (ref 23–31)
CREAT SERPL-MCNC: 0.7 MG/DL (ref 0.55–1.02)
CREAT/UREA NIT SERPL: 27
EOSINOPHIL # BLD AUTO: 0.03 X10(3)/MCL (ref 0–0.9)
EOSINOPHIL NFR BLD AUTO: 0.8 %
ERYTHROCYTE [DISTWIDTH] IN BLOOD BY AUTOMATED COUNT: 11.7 % (ref 11.5–17)
GFR SERPLBLD CREATININE-BSD FMLA CKD-EPI: >60 ML/MIN/1.73/M2
GLOBULIN SER-MCNC: 3.4 GM/DL (ref 2.4–3.5)
GLUCOSE SERPL-MCNC: 97 MG/DL (ref 82–115)
HCT VFR BLD AUTO: 30.1 % (ref 37–47)
HGB BLD-MCNC: 10 G/DL (ref 12–16)
IMM GRANULOCYTES # BLD AUTO: 0.01 X10(3)/MCL (ref 0–0.04)
IMM GRANULOCYTES NFR BLD AUTO: 0.3 %
LDH SERPL-CCNC: 350 U/L (ref 125–220)
LYMPHOCYTES # BLD AUTO: 0.74 X10(3)/MCL (ref 0.6–4.6)
LYMPHOCYTES NFR BLD AUTO: 20.7 %
MCH RBC QN AUTO: 35.3 PG (ref 27–31)
MCHC RBC AUTO-ENTMCNC: 33.2 G/DL (ref 33–36)
MCV RBC AUTO: 106.4 FL (ref 80–94)
MONOCYTES # BLD AUTO: 0.46 X10(3)/MCL (ref 0.1–1.3)
MONOCYTES NFR BLD AUTO: 12.8 %
NEUTROPHILS # BLD AUTO: 2.3 X10(3)/MCL (ref 2.1–9.2)
NEUTROPHILS NFR BLD AUTO: 64.3 %
PLATELET # BLD AUTO: 557 X10(3)/MCL (ref 130–400)
PMV BLD AUTO: 9.3 FL (ref 7.4–10.4)
POTASSIUM SERPL-SCNC: 4.1 MMOL/L (ref 3.5–5.1)
PROT SERPL-MCNC: 7 GM/DL (ref 5.8–7.6)
RBC # BLD AUTO: 2.83 X10(6)/MCL (ref 4.2–5.4)
SODIUM SERPL-SCNC: 143 MMOL/L (ref 136–145)
WBC # BLD AUTO: 3.58 X10(3)/MCL (ref 4.5–11.5)

## 2025-07-03 PROCEDURE — 1126F AMNT PAIN NOTED NONE PRSNT: CPT | Mod: CPTII,S$GLB,, | Performed by: NURSE PRACTITIONER

## 2025-07-03 PROCEDURE — 3008F BODY MASS INDEX DOCD: CPT | Mod: CPTII,S$GLB,, | Performed by: NURSE PRACTITIONER

## 2025-07-03 PROCEDURE — 83615 LACTATE (LD) (LDH) ENZYME: CPT

## 2025-07-03 PROCEDURE — 80053 COMPREHEN METABOLIC PANEL: CPT

## 2025-07-03 PROCEDURE — 3078F DIAST BP <80 MM HG: CPT | Mod: CPTII,S$GLB,, | Performed by: NURSE PRACTITIONER

## 2025-07-03 PROCEDURE — 99999 PR PBB SHADOW E&M-EST. PATIENT-LVL IV: CPT | Mod: PBBFAC,,, | Performed by: NURSE PRACTITIONER

## 2025-07-03 PROCEDURE — 1157F ADVNC CARE PLAN IN RCRD: CPT | Mod: CPTII,S$GLB,, | Performed by: NURSE PRACTITIONER

## 2025-07-03 PROCEDURE — 4010F ACE/ARB THERAPY RXD/TAKEN: CPT | Mod: CPTII,S$GLB,, | Performed by: NURSE PRACTITIONER

## 2025-07-03 PROCEDURE — 1101F PT FALLS ASSESS-DOCD LE1/YR: CPT | Mod: CPTII,S$GLB,, | Performed by: NURSE PRACTITIONER

## 2025-07-03 PROCEDURE — 36415 COLL VENOUS BLD VENIPUNCTURE: CPT

## 2025-07-03 PROCEDURE — 99215 OFFICE O/P EST HI 40 MIN: CPT | Mod: S$GLB,,, | Performed by: NURSE PRACTITIONER

## 2025-07-03 PROCEDURE — 85025 COMPLETE CBC W/AUTO DIFF WBC: CPT

## 2025-07-03 PROCEDURE — 1159F MED LIST DOCD IN RCRD: CPT | Mod: CPTII,S$GLB,, | Performed by: NURSE PRACTITIONER

## 2025-07-03 PROCEDURE — 3075F SYST BP GE 130 - 139MM HG: CPT | Mod: CPTII,S$GLB,, | Performed by: NURSE PRACTITIONER

## 2025-07-03 PROCEDURE — G2211 COMPLEX E/M VISIT ADD ON: HCPCS | Mod: S$GLB,,, | Performed by: NURSE PRACTITIONER

## 2025-07-03 PROCEDURE — 3288F FALL RISK ASSESSMENT DOCD: CPT | Mod: CPTII,S$GLB,, | Performed by: NURSE PRACTITIONER

## 2025-07-03 RX ORDER — DOXYCYCLINE HYCLATE 100 MG
100 TABLET ORAL 2 TIMES DAILY
COMMUNITY
Start: 2025-06-23

## 2025-07-03 NOTE — PROGRESS NOTES
HEMATOLOGY/ONCOLOGY OFFICE CLINIC VISIT    Visit Information:  Visit type:  On-going care, Review/discussion of tests, Scheduled follow-up.    Accompanied by:  No one.    Source of history:  Self.    Referral source:  Roc GIFFORD, Isaac HUANG    History limitation:  None.        Initial Consultation: 3/28/2022  Referring Physician: Dr Brian  Other Physicians:  Code Status: Not addressed    Diagnosis/Problem list:   Essential Thrombocytosis  --CALR mutation analysis, exon 9: Positive.    Present Treatment:  Hydrea 500 mg BID + Agrylin 0.5 mg daily on 9/12/23  Agrylin 1.0 mg TID on 6/3/2025      Treatment history:    Hydrea 500 mg Daily + Agrylin 1 mg daily (2mg on Sat/Sun only)  Hydrea 500 mg BID + Agrylin 1 mg daily (2/9/23)  Hydrea held + Agryling 0.5 mg TID- 5/15/2025 due to leg ulcer. 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 Peripheral blood, JAK2 V617F mutation analysis: Negative for JAK2 V617F.    CLINICAL HISTORY:       Patient: 75 years old female kindly referred for thrombocytosis.     Reviewing electronic on medical record it seems like her platelets were normal up to March 2018.  In March 2019 platelets were mildly elevated 496,009 slowly there were trending up with the most recent on 3/15/2022 of 1,012,000.  She is taking baby aspirin.    March 21, 2022 15:21 Peripheral blood, CALR mutation analysis, exon 9: Positive. 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  Peripheral blood, JAK2 V617F mutation analysis: Negative for JAK2 V617F.    She denies any family history of blood disorders or malignancies.        Chief Complaint: Follow-up    Here for ET    Interval History:  She was taking Hydrea 500 mg BID and Agrylin 1 mg daily since 2/9/23. In 9/2023, we decreased Agrylin to 0.5 mg  daily d/t anemia.  On 4/17/24, we dropped the weekend doses of Agrylin so that she would take 0.5 mg daily M-F only. She was mildly anemic before she even started Hydrea in 3/2022. Her WBC is decreased but stable.  She denies any signs or symptoms of infection.  She denies any fever, chills, sweats.  No chest pain or shortness of breath.  No bleeding.   No headaches or changes in vision. No neurologic deficit.     Interval history  07/03/25:  Aimee Oseguera presents for follow-up of essential thrombocythemia.   Currently managing essential thrombocythemia with Anagrelide 1mg three times daily. Platelets decreased from 629 to 557k. Hemoglobin stable. White blood cell count stable.   Patient reports that she went to see her dermatologist, Dr. Woody for the MOHS procedure for squamous cell carcinoma on her left leg but he did a culture and it was positive for staph so she is receiving antibiotics 1st.  She goes back to him for visit on 07/09/2025.  She is currently taking Agrylin 1 mg 3 times a day.  And platelet count is slowly trending down.    Review of Systems   Constitutional:  Positive for malaise/fatigue.   Respiratory:  Negative for cough and shortness of breath.    Cardiovascular:  Negative for chest pain.   Gastrointestinal:  Negative for abdominal pain and diarrhea.   Genitourinary:  Negative for frequency.   Musculoskeletal:  Negative for back pain.   Skin:  Negative for rash.        UlcerLLE   Neurological:  Positive for headaches.   Psychiatric/Behavioral:  The patient is nervous/anxious.      Histories:  PMH/PSH/FH/SOCIAL/ALLERGIES AND MEDS REVIEWED AND UPDATED AS APPROPRIATE     Vitals:    07/03/25 1113   Weight: 78.3 kg (172 lb 9.6 oz)          Physical Exam  Vitals and nursing note reviewed.   Constitutional:       General: She is not in acute distress.     Appearance: Normal appearance. She is well-developed.   HENT:      Head: Normocephalic and atraumatic.      Mouth/Throat:      Mouth: Mucous  membranes are moist.   Eyes:      General: No scleral icterus.     Extraocular Movements: Extraocular movements intact.      Conjunctiva/sclera: Conjunctivae normal.      Pupils: Pupils are equal, round, and reactive to light.   Neck:      Vascular: No JVD.   Cardiovascular:      Rate and Rhythm: Normal rate and regular rhythm.      Heart sounds: No murmur heard.  Pulmonary:      Effort: Pulmonary effort is normal.      Breath sounds: Normal breath sounds. No wheezing or rhonchi.   Chest:      Chest wall: No deformity or tenderness.   Breasts:     Right: No swelling, mass, nipple discharge, skin change or tenderness.      Left: No swelling, mass, nipple discharge, skin change or tenderness.   Abdominal:      General: Bowel sounds are normal. There is no distension.      Palpations: Abdomen is soft. There is no splenomegaly or mass.      Tenderness: There is no abdominal tenderness.   Musculoskeletal:         General: No swelling or deformity.      Cervical back: Neck supple.      Right lower leg: Edema present.      Left lower leg: Edema present.   Lymphadenopathy:      Cervical: No cervical adenopathy.      Upper Body:      Right upper body: No supraclavicular or axillary adenopathy.      Left upper body: No supraclavicular or axillary adenopathy.      Lower Body: No right inguinal adenopathy. No left inguinal adenopathy.   Skin:     General: Skin is warm.      Coloration: Skin is not jaundiced.      Findings: Lesion (LLE leg ulcer above the ankle size of a dime, open, oozing serous fluid.) present. No rash.      Nails: There is no clubbing.   Neurological:      General: No focal deficit present.      Mental Status: She is alert and oriented to person, place, and time.      Sensory: Sensation is intact.      Motor: Motor function is intact.      Gait: Gait is intact.   Psychiatric:         Attention and Perception: Attention normal.         Mood and Affect: Mood and affect normal.         Speech: Speech normal.          Behavior: Behavior is cooperative.         Thought Content: Thought content normal.         Cognition and Memory: Cognition normal.         Judgment: Judgment normal.       ECOG SCORE             Laboratory:  CBC with Differential:  Lab Results   Component Value Date    WBC 3.58 (L) 07/03/2025    RBC 2.83 (L) 07/03/2025    HGB 10.0 (L) 07/03/2025    HCT 30.1 (L) 07/03/2025    .4 (H) 07/03/2025    MCH 35.3 (H) 07/03/2025    MCHC 33.2 07/03/2025    RDW 11.7 07/03/2025     (H) 07/03/2025    MPV 9.3 07/03/2025       Latest Reference Range & Units 05/23/22 08:40 06/09/22 07:37 06/23/22 11:27 07/07/22 07:27 07/21/22 11:18 08/04/22 08:10 08/18/22 10:52   WBC 4.5 - 11.5 x10(3)/mcL 2.6 (L) 3.3 (L) 4.0 (L) 3.1 (L) 3.4 (L) 3.5 (L) 4.1 (L)   (L): Data is abnormally low     Reference Range & Units 05/23/22 08:40 06/09/22 07:37 06/23/22 11:27 07/07/22 07:27 07/21/22 11:18 08/04/22 08:10 08/18/22 10:52   Platelets 130 - 400 x10(3)/mcL 449 (H) 557 (H) 612 (H) 623 (H) 646 (H) 673 (H) 590 (H)   (H): Data is abnormally high   Reference Range & Units 05/23/22 08:40 06/09/22 07:37 06/23/22 11:27 07/07/22 07:27 07/21/22 11:18 08/04/22 08:10 08/18/22 10:52   Hemoglobin 12.0 - 16.0 gm/dL 10.5 (L) 10.6 (L) 10.8 (L) 10.9 (L) 11.0 (L) 10.8 (L) 11.3 (L)   (L): Data is abnormally low    CMP:  Sodium   Date Value Ref Range Status   06/19/2025 139 136 - 145 mmol/L Final     Potassium   Date Value Ref Range Status   06/19/2025 4.8 3.5 - 5.1 mmol/L Final     Chloride   Date Value Ref Range Status   06/19/2025 108 (H) 98 - 107 mmol/L Final     CO2   Date Value Ref Range Status   06/19/2025 28 23 - 31 mmol/L Final     Glucose   Date Value Ref Range Status   06/19/2025 93 82 - 115 mg/dL Final     Blood Urea Nitrogen   Date Value Ref Range Status   06/19/2025 18.6 9.8 - 20.1 mg/dL Final     Creatinine   Date Value Ref Range Status   06/19/2025 0.67 0.55 - 1.02 mg/dL Final     Calcium   Date Value Ref Range Status   06/19/2025 9.2 8.4  - 10.2 mg/dL Final     Protein Total   Date Value Ref Range Status   06/19/2025 7.4 5.8 - 7.6 gm/dL Final     Albumin   Date Value Ref Range Status   06/19/2025 3.7 3.4 - 4.8 g/dL Final     Bilirubin Total   Date Value Ref Range Status   06/19/2025 0.3 <=1.5 mg/dL Final     ALP   Date Value Ref Range Status   06/19/2025 74 40 - 150 unit/L Final     AST   Date Value Ref Range Status   06/19/2025 21 11 - 45 unit/L Final     ALT   Date Value Ref Range Status   06/19/2025 11 0 - 55 unit/L Final     Estimated GFR-Non    Date Value Ref Range Status   08/04/2022 >60 mls/min/1.73/m2 Final         Assessment:         1. Essential thrombocytosis    2. Therapeutic drug monitoring    3. Drug-induced immunodeficiency      Essential thrombocytosis -- CALR mutation positive, 52 bp deletion Exon 9   --on Hydrea and Agrylin  Anemia-due to therapy  Leukopenia-due to therapy       Plan:     Dx:  Essential thrombocytosis CALR positive,  52 bp deletion Exon 9   Status:  Slightly worsened with platelet count 645 (previously 478)   - Continue Anagrelide 1mg three times daily with goal of platelets <600.  - Continue daily baby aspirin and maintain hydration.  - For a patient with essential thrombocytosis (ET) who is CALR positive, has newly diagnosed skin cancer, and is intolerant of hydroxyurea (with prior anagrelide exposure), the best next therapy to consider is pegylated interferon maria eugenia. This recommendation is supported by multiple lines of evidence:  - Pegylated interferon maria eugenia is preferred in hydroxyurea-intolerant patients, particularly those who are younger or have a history of or risk for secondary malignancies, due to its lack of genotoxic and leukemogenic effects. This is especially relevant in the context of a new skin cancer diagnosis, where minimizing additional mutagenic risk is prudent.  - CALR-mutated ET responds favorably to interferon, with high rates of hematologic and molecular responses, including  significant reduction in CALR mutant allele burden.  - Busulfan is an alternative, generally reserved for older patients or those unable to tolerate interferon, but is less favored in patients with a history of malignancy due to potential long-term safety concerns, despite recent data not showing increased leukemogenicity.  - Anagrelide is less preferred due to cardiovascular toxicity and less robust efficacy in high-risk patients.  - There is no evidence supporting the use of ruxolitinib or other GRZEGORZ inhibitors in this setting.  - The current treatment algorithm for ET highlights pegylated interferon maria eugenia and busulfan as second-line options in hydroxyurea-intolerant patients, with a preference for interferon in younger patients and those with malignancy risk.  Dx:  Anxiety/depression  Status:  Ongoing  Continue Lexapro 20 mg PO q day    Dx:  Anemia  Status:  Ongoing  Related to treatment  Stable  We will continue to monitor  Dx: Squamous Cell Carcinoma, Lower Extremity  Status: Awaiting further evaluation  Currently on antibiotic for staph. MOHS procedure is on hold for now.   Follow-up with Dr. Woody on 07/09/25 .           We will continue to monitor closely.  Return to clinic in 2 weeks with labs same day.    - code applied: patient requires or will require a continuous, longitudinal and active collaborative plan of care related to this patient's health condition, essential thrombocytosis--- the management of which requires the direction of a practitioner with specialized clinical knowledge, skill and expertise.

## 2025-07-17 ENCOUNTER — OFFICE VISIT (OUTPATIENT)
Dept: HEMATOLOGY/ONCOLOGY | Facility: CLINIC | Age: 75
End: 2025-07-17
Payer: MEDICARE

## 2025-07-17 ENCOUNTER — LAB VISIT (OUTPATIENT)
Dept: LAB | Facility: HOSPITAL | Age: 75
End: 2025-07-17
Attending: INTERNAL MEDICINE
Payer: MEDICARE

## 2025-07-17 VITALS
HEIGHT: 67 IN | TEMPERATURE: 97 F | HEART RATE: 70 BPM | BODY MASS INDEX: 26.78 KG/M2 | SYSTOLIC BLOOD PRESSURE: 135 MMHG | DIASTOLIC BLOOD PRESSURE: 70 MMHG | WEIGHT: 170.63 LBS | OXYGEN SATURATION: 100 % | RESPIRATION RATE: 18 BRPM

## 2025-07-17 DIAGNOSIS — Z79.899 DRUG-INDUCED IMMUNODEFICIENCY: ICD-10-CM

## 2025-07-17 DIAGNOSIS — Z51.81 THERAPEUTIC DRUG MONITORING: ICD-10-CM

## 2025-07-17 DIAGNOSIS — D84.821 DRUG-INDUCED IMMUNODEFICIENCY: ICD-10-CM

## 2025-07-17 DIAGNOSIS — D47.3 ESSENTIAL THROMBOCYTOSIS: ICD-10-CM

## 2025-07-17 DIAGNOSIS — D47.3 ESSENTIAL THROMBOCYTOSIS: Primary | ICD-10-CM

## 2025-07-17 LAB
ALBUMIN SERPL-MCNC: 3.6 G/DL (ref 3.4–4.8)
ALBUMIN/GLOB SERPL: 1 RATIO (ref 1.1–2)
ALP SERPL-CCNC: 97 UNIT/L (ref 40–150)
ALT SERPL-CCNC: 10 UNIT/L (ref 0–55)
ANION GAP SERPL CALC-SCNC: 9 MEQ/L
AST SERPL-CCNC: 26 UNIT/L (ref 11–45)
BASOPHILS # BLD AUTO: 0.05 X10(3)/MCL
BASOPHILS NFR BLD AUTO: 1.2 %
BILIRUB SERPL-MCNC: 0.3 MG/DL
BUN SERPL-MCNC: 21.8 MG/DL (ref 9.8–20.1)
CALCIUM SERPL-MCNC: 9.1 MG/DL (ref 8.4–10.2)
CHLORIDE SERPL-SCNC: 109 MMOL/L (ref 98–107)
CO2 SERPL-SCNC: 25 MMOL/L (ref 23–31)
CREAT SERPL-MCNC: 0.74 MG/DL (ref 0.55–1.02)
CREAT/UREA NIT SERPL: 29
EOSINOPHIL # BLD AUTO: 0.08 X10(3)/MCL (ref 0–0.9)
EOSINOPHIL NFR BLD AUTO: 2 %
ERYTHROCYTE [DISTWIDTH] IN BLOOD BY AUTOMATED COUNT: 12.2 % (ref 11.5–17)
GFR SERPLBLD CREATININE-BSD FMLA CKD-EPI: >60 ML/MIN/1.73/M2
GLOBULIN SER-MCNC: 3.7 GM/DL (ref 2.4–3.5)
GLUCOSE SERPL-MCNC: 109 MG/DL (ref 82–115)
HCT VFR BLD AUTO: 29.6 % (ref 37–47)
HGB BLD-MCNC: 9.7 G/DL (ref 12–16)
IMM GRANULOCYTES # BLD AUTO: 0.02 X10(3)/MCL (ref 0–0.04)
IMM GRANULOCYTES NFR BLD AUTO: 0.5 %
LDH SERPL-CCNC: 939 U/L (ref 125–220)
LYMPHOCYTES # BLD AUTO: 0.75 X10(3)/MCL (ref 0.6–4.6)
LYMPHOCYTES NFR BLD AUTO: 18.5 %
MCH RBC QN AUTO: 34.4 PG (ref 27–31)
MCHC RBC AUTO-ENTMCNC: 32.8 G/DL (ref 33–36)
MCV RBC AUTO: 105 FL (ref 80–94)
MONOCYTES # BLD AUTO: 0.47 X10(3)/MCL (ref 0.1–1.3)
MONOCYTES NFR BLD AUTO: 11.6 %
NEUTROPHILS # BLD AUTO: 2.69 X10(3)/MCL (ref 2.1–9.2)
NEUTROPHILS NFR BLD AUTO: 66.2 %
PLATELET # BLD AUTO: 539 X10(3)/MCL (ref 130–400)
PMV BLD AUTO: 9.3 FL (ref 7.4–10.4)
POTASSIUM SERPL-SCNC: 4.4 MMOL/L (ref 3.5–5.1)
PROT SERPL-MCNC: 7.3 GM/DL (ref 5.8–7.6)
RBC # BLD AUTO: 2.82 X10(6)/MCL (ref 4.2–5.4)
SODIUM SERPL-SCNC: 143 MMOL/L (ref 136–145)
WBC # BLD AUTO: 4.06 X10(3)/MCL (ref 4.5–11.5)

## 2025-07-17 PROCEDURE — 85025 COMPLETE CBC W/AUTO DIFF WBC: CPT

## 2025-07-17 PROCEDURE — 99215 OFFICE O/P EST HI 40 MIN: CPT | Mod: S$GLB,,, | Performed by: NURSE PRACTITIONER

## 2025-07-17 PROCEDURE — 3075F SYST BP GE 130 - 139MM HG: CPT | Mod: CPTII,S$GLB,, | Performed by: NURSE PRACTITIONER

## 2025-07-17 PROCEDURE — 1126F AMNT PAIN NOTED NONE PRSNT: CPT | Mod: CPTII,S$GLB,, | Performed by: NURSE PRACTITIONER

## 2025-07-17 PROCEDURE — 99999 PR PBB SHADOW E&M-EST. PATIENT-LVL III: CPT | Mod: PBBFAC,,, | Performed by: NURSE PRACTITIONER

## 2025-07-17 PROCEDURE — 80053 COMPREHEN METABOLIC PANEL: CPT

## 2025-07-17 PROCEDURE — 83615 LACTATE (LD) (LDH) ENZYME: CPT

## 2025-07-17 PROCEDURE — 36415 COLL VENOUS BLD VENIPUNCTURE: CPT

## 2025-07-17 PROCEDURE — 3008F BODY MASS INDEX DOCD: CPT | Mod: CPTII,S$GLB,, | Performed by: NURSE PRACTITIONER

## 2025-07-17 PROCEDURE — G2211 COMPLEX E/M VISIT ADD ON: HCPCS | Mod: S$GLB,,, | Performed by: NURSE PRACTITIONER

## 2025-07-17 PROCEDURE — 4010F ACE/ARB THERAPY RXD/TAKEN: CPT | Mod: CPTII,S$GLB,, | Performed by: NURSE PRACTITIONER

## 2025-07-17 PROCEDURE — 1159F MED LIST DOCD IN RCRD: CPT | Mod: CPTII,S$GLB,, | Performed by: NURSE PRACTITIONER

## 2025-07-17 PROCEDURE — 1157F ADVNC CARE PLAN IN RCRD: CPT | Mod: CPTII,S$GLB,, | Performed by: NURSE PRACTITIONER

## 2025-07-17 PROCEDURE — 3288F FALL RISK ASSESSMENT DOCD: CPT | Mod: CPTII,S$GLB,, | Performed by: NURSE PRACTITIONER

## 2025-07-17 PROCEDURE — 1101F PT FALLS ASSESS-DOCD LE1/YR: CPT | Mod: CPTII,S$GLB,, | Performed by: NURSE PRACTITIONER

## 2025-07-17 PROCEDURE — 3078F DIAST BP <80 MM HG: CPT | Mod: CPTII,S$GLB,, | Performed by: NURSE PRACTITIONER

## 2025-07-17 NOTE — PROGRESS NOTES
HEMATOLOGY/ONCOLOGY OFFICE CLINIC VISIT    Visit Information:  Visit type:  On-going care, Review/discussion of tests, Scheduled follow-up.    Accompanied by:  No one.    Source of history:  Self.    Referral source:  Roc GIFFORD, Isaac HUANG    History limitation:  None.        Initial Consultation: 3/28/2022  Referring Physician: Dr Brian  Other Physicians:  Code Status: Not addressed    Diagnosis/Problem list:   Essential Thrombocytosis  --CALR mutation analysis, exon 9: Positive.    Present Treatment:  Hydrea 500 mg BID + Agrylin 0.5 mg daily on 9/12/23  Agrylin 1.0 mg TID on 6/3/2025      Treatment history:    Hydrea 500 mg Daily + Agrylin 1 mg daily (2mg on Sat/Sun only)  Hydrea 500 mg BID + Agrylin 1 mg daily (2/9/23)  Hydrea held + Agryling 0.5 mg TID- 5/15/2025 due to leg ulcer. 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 Peripheral blood, JAK2 V617F mutation analysis: Negative for JAK2 V617F.    CLINICAL HISTORY:       Patient: 75 years old female kindly referred for thrombocytosis.     Reviewing electronic on medical record it seems like her platelets were normal up to March 2018.  In March 2019 platelets were mildly elevated 496,009 slowly there were trending up with the most recent on 3/15/2022 of 1,012,000.  She is taking baby aspirin.    March 21, 2022 15:21 Peripheral blood, CALR mutation analysis, exon 9: Positive. 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  Peripheral blood, JAK2 V617F mutation analysis: Negative for JAK2 V617F.    She denies any family history of blood disorders or malignancies.        Chief Complaint: No chief complaint on file.    Here for ET    Interval History:  She was taking Hydrea 500 mg BID and Agrylin 1 mg daily since 2/9/23. In 9/2023, we decreased  Agrylin to 0.5 mg daily d/t anemia.  On 4/17/24, we dropped the weekend doses of Agrylin so that she would take 0.5 mg daily M-F only. She was mildly anemic before she even started Hydrea in 3/2022. Her WBC is decreased but stable.  She denies any signs or symptoms of infection.  She denies any fever, chills, sweats.  No chest pain or shortness of breath.  No bleeding.   No headaches or changes in vision. No neurologic deficit.     Interval history  07/17/25:  Aimee Oseguera presents for follow-up of essential thrombocythemia.   Currently managing essential thrombocythemia with Anagrelide 1mg three times daily. Platelets decreased from 557 to 539k. Hemoglobin stable. White blood cell count stable.   Patient reports that she went to see her dermatologist, Dr. Wodoy for the MOHS procedure for squamous cell carcinoma on her left leg but he did a culture and it was positive for staph so she is receiving antibiotics. She says that she was told that she does NOT have squamous cell carcinoma. Will request records again.   She is currently taking Agrylin 1 mg 3 times a day.  And platelet count is slowly trending down. Hgb has decreased some. She had vein procedure last week.     Review of Systems   Constitutional:  Positive for malaise/fatigue.   Respiratory:  Negative for cough and shortness of breath.    Cardiovascular:  Negative for chest pain.   Gastrointestinal:  Negative for abdominal pain and diarrhea.   Genitourinary:  Negative for frequency.   Musculoskeletal:  Negative for back pain.   Skin:  Negative for rash.        UlcerLLE   Neurological:  Positive for headaches.   Psychiatric/Behavioral:  The patient is nervous/anxious.      Histories:  PMH/PSH/FH/SOCIAL/ALLERGIES AND MEDS REVIEWED AND UPDATED AS APPROPRIATE     Vitals:    07/17/25 1419   BP: 135/70   BP Location: Right arm   Patient Position: Sitting   Pulse: 70   Resp: 18   Temp: 97.3 °F (36.3 °C)   TempSrc: Oral   SpO2: 100%   Weight: 77.4 kg (170 lb  "9.6 oz)   Height: 5' 7" (1.702 m)            Physical Exam  Vitals and nursing note reviewed.   Constitutional:       General: She is not in acute distress.     Appearance: Normal appearance. She is well-developed.   HENT:      Head: Normocephalic and atraumatic.      Mouth/Throat:      Mouth: Mucous membranes are moist.   Eyes:      General: No scleral icterus.     Extraocular Movements: Extraocular movements intact.      Conjunctiva/sclera: Conjunctivae normal.      Pupils: Pupils are equal, round, and reactive to light.   Neck:      Vascular: No JVD.   Cardiovascular:      Rate and Rhythm: Normal rate and regular rhythm.      Heart sounds: No murmur heard.  Pulmonary:      Effort: Pulmonary effort is normal.      Breath sounds: Normal breath sounds. No wheezing or rhonchi.   Chest:      Chest wall: No deformity or tenderness.   Breasts:     Right: No swelling, mass, nipple discharge, skin change or tenderness.      Left: No swelling, mass, nipple discharge, skin change or tenderness.   Abdominal:      General: Bowel sounds are normal. There is no distension.      Palpations: Abdomen is soft. There is no splenomegaly or mass.      Tenderness: There is no abdominal tenderness.   Musculoskeletal:         General: No swelling or deformity.      Cervical back: Neck supple.      Right lower leg: Edema present.      Left lower leg: Edema present.   Lymphadenopathy:      Cervical: No cervical adenopathy.      Upper Body:      Right upper body: No supraclavicular or axillary adenopathy.      Left upper body: No supraclavicular or axillary adenopathy.      Lower Body: No right inguinal adenopathy. No left inguinal adenopathy.   Skin:     General: Skin is warm.      Coloration: Skin is not jaundiced.      Findings: Lesion (LLE leg ulcer above the ankle size of a dime, open, oozing serous fluid.) present. No rash.      Nails: There is no clubbing.   Neurological:      General: No focal deficit present.      Mental Status: " She is alert and oriented to person, place, and time.      Sensory: Sensation is intact.      Motor: Motor function is intact.      Gait: Gait is intact.   Psychiatric:         Attention and Perception: Attention normal.         Mood and Affect: Mood and affect normal.         Speech: Speech normal.         Behavior: Behavior is cooperative.         Thought Content: Thought content normal.         Cognition and Memory: Cognition normal.         Judgment: Judgment normal.       ECOG SCORE             Laboratory:  CBC with Differential:  Lab Results   Component Value Date    WBC 4.06 (L) 07/17/2025    RBC 2.82 (L) 07/17/2025    HGB 9.7 (L) 07/17/2025    HCT 29.6 (L) 07/17/2025    .0 (H) 07/17/2025    MCH 34.4 (H) 07/17/2025    MCHC 32.8 (L) 07/17/2025    RDW 12.2 07/17/2025     (H) 07/17/2025    MPV 9.3 07/17/2025       Latest Reference Range & Units 05/23/22 08:40 06/09/22 07:37 06/23/22 11:27 07/07/22 07:27 07/21/22 11:18 08/04/22 08:10 08/18/22 10:52   WBC 4.5 - 11.5 x10(3)/mcL 2.6 (L) 3.3 (L) 4.0 (L) 3.1 (L) 3.4 (L) 3.5 (L) 4.1 (L)   (L): Data is abnormally low     Reference Range & Units 05/23/22 08:40 06/09/22 07:37 06/23/22 11:27 07/07/22 07:27 07/21/22 11:18 08/04/22 08:10 08/18/22 10:52   Platelets 130 - 400 x10(3)/mcL 449 (H) 557 (H) 612 (H) 623 (H) 646 (H) 673 (H) 590 (H)   (H): Data is abnormally high   Reference Range & Units 05/23/22 08:40 06/09/22 07:37 06/23/22 11:27 07/07/22 07:27 07/21/22 11:18 08/04/22 08:10 08/18/22 10:52   Hemoglobin 12.0 - 16.0 gm/dL 10.5 (L) 10.6 (L) 10.8 (L) 10.9 (L) 11.0 (L) 10.8 (L) 11.3 (L)   (L): Data is abnormally low    CMP:  Sodium   Date Value Ref Range Status   07/17/2025 143 136 - 145 mmol/L Final     Potassium   Date Value Ref Range Status   07/17/2025 4.4 3.5 - 5.1 mmol/L Final     Chloride   Date Value Ref Range Status   07/17/2025 109 (H) 98 - 107 mmol/L Final     CO2   Date Value Ref Range Status   07/17/2025 25 23 - 31 mmol/L Final     Glucose    Date Value Ref Range Status   07/17/2025 109 82 - 115 mg/dL Final     Blood Urea Nitrogen   Date Value Ref Range Status   07/17/2025 21.8 (H) 9.8 - 20.1 mg/dL Final     Creatinine   Date Value Ref Range Status   07/17/2025 0.74 0.55 - 1.02 mg/dL Final     Calcium   Date Value Ref Range Status   07/17/2025 9.1 8.4 - 10.2 mg/dL Final     Protein Total   Date Value Ref Range Status   07/17/2025 7.3 5.8 - 7.6 gm/dL Final     Albumin   Date Value Ref Range Status   07/17/2025 3.6 3.4 - 4.8 g/dL Final     Bilirubin Total   Date Value Ref Range Status   07/17/2025 0.3 <=1.5 mg/dL Final     ALP   Date Value Ref Range Status   07/17/2025 97 40 - 150 unit/L Final     AST   Date Value Ref Range Status   07/17/2025 26 11 - 45 unit/L Final     ALT   Date Value Ref Range Status   07/17/2025 10 0 - 55 unit/L Final     Estimated GFR-Non    Date Value Ref Range Status   08/04/2022 >60 mls/min/1.73/m2 Final         Assessment:         1. Essential thrombocytosis    2. Drug-induced immunodeficiency    3. Therapeutic drug monitoring        Essential thrombocytosis -- CALR mutation positive, 52 bp deletion Exon 9   --on Hydrea and Agrylin  Anemia-due to therapy  Leukopenia-due to therapy       Plan:     Dx:  Essential thrombocytosis CALR positive,  52 bp deletion Exon 9   Status:  Slightly worsened with platelet count 645 (previously 478)   - Continue Anagrelide 1mg three times daily with goal of platelets <600.  - Continue daily baby aspirin and maintain hydration.  - For a patient with essential thrombocytosis (ET) who is CALR positive, has newly diagnosed skin cancer, and is intolerant of hydroxyurea (with prior anagrelide exposure), the best next therapy to consider is pegylated interferon maria eugenia. This recommendation is supported by multiple lines of evidence:  - Pegylated interferon maria eugenia is preferred in hydroxyurea-intolerant patients, particularly those who are younger or have a history of or risk for secondary  malignancies, due to its lack of genotoxic and leukemogenic effects. This is especially relevant in the context of a new skin cancer diagnosis, where minimizing additional mutagenic risk is prudent.  - CALR-mutated ET responds favorably to interferon, with high rates of hematologic and molecular responses, including significant reduction in CALR mutant allele burden.  - Busulfan is an alternative, generally reserved for older patients or those unable to tolerate interferon, but is less favored in patients with a history of malignancy due to potential long-term safety concerns, despite recent data not showing increased leukemogenicity.  - Anagrelide is less preferred due to cardiovascular toxicity and less robust efficacy in high-risk patients.  - There is no evidence supporting the use of ruxolitinib or other GRZEGORZ inhibitors in this setting.  - The current treatment algorithm for ET highlights pegylated interferon maria eugenia and busulfan as second-line options in hydroxyurea-intolerant patients, with a preference for interferon in younger patients and those with malignancy risk.  Dx:  Anxiety/depression  Status:  Ongoing  Continue Lexapro 20 mg PO q day    Dx:  Anemia  Status:  Ongoing  Related to treatment  Stable  We will continue to monitor    Dx: Squamous Cell Carcinoma, Lower Extremity?  Status: Awaiting further evaluation  Requested records from Dr. Lopez and Dr. Woody.       We will continue to monitor closely.  Return to clinic in 3 weeks with labs same day.    - code applied: patient requires or will require a continuous, longitudinal and active collaborative plan of care related to this patient's health condition, essential thrombocytosis--- the management of which requires the direction of a practitioner with specialized clinical knowledge, skill and expertise.

## 2025-07-31 DIAGNOSIS — D47.3 ESSENTIAL THROMBOCYTOSIS: ICD-10-CM

## 2025-07-31 RX ORDER — ANAGRELIDE 1 MG/1
1 CAPSULE ORAL 3 TIMES DAILY
Qty: 90 CAPSULE | Refills: 1 | Status: SHIPPED | OUTPATIENT
Start: 2025-07-31

## 2025-08-07 ENCOUNTER — OFFICE VISIT (OUTPATIENT)
Dept: HEMATOLOGY/ONCOLOGY | Facility: CLINIC | Age: 75
End: 2025-08-07
Payer: MEDICARE

## 2025-08-07 ENCOUNTER — LAB VISIT (OUTPATIENT)
Dept: LAB | Facility: HOSPITAL | Age: 75
End: 2025-08-07
Attending: INTERNAL MEDICINE
Payer: MEDICARE

## 2025-08-07 VITALS
WEIGHT: 169.81 LBS | BODY MASS INDEX: 26.65 KG/M2 | HEART RATE: 84 BPM | DIASTOLIC BLOOD PRESSURE: 67 MMHG | RESPIRATION RATE: 20 BRPM | OXYGEN SATURATION: 98 % | HEIGHT: 67 IN | TEMPERATURE: 98 F | SYSTOLIC BLOOD PRESSURE: 108 MMHG

## 2025-08-07 DIAGNOSIS — Z79.899 DRUG-INDUCED IMMUNODEFICIENCY: ICD-10-CM

## 2025-08-07 DIAGNOSIS — Z51.81 THERAPEUTIC DRUG MONITORING: ICD-10-CM

## 2025-08-07 DIAGNOSIS — D47.3 ESSENTIAL THROMBOCYTOSIS: ICD-10-CM

## 2025-08-07 DIAGNOSIS — D84.821 DRUG-INDUCED IMMUNODEFICIENCY: ICD-10-CM

## 2025-08-07 DIAGNOSIS — D47.3 ESSENTIAL THROMBOCYTOSIS: Primary | ICD-10-CM

## 2025-08-07 LAB
ALBUMIN SERPL-MCNC: 3.7 G/DL (ref 3.4–4.8)
ALBUMIN/GLOB SERPL: 1 RATIO (ref 1.1–2)
ALP SERPL-CCNC: 84 UNIT/L (ref 40–150)
ALT SERPL-CCNC: 10 UNIT/L (ref 0–55)
ANION GAP SERPL CALC-SCNC: 9 MEQ/L
AST SERPL-CCNC: 24 UNIT/L (ref 11–45)
BASOPHILS # BLD AUTO: 0.05 X10(3)/MCL
BASOPHILS NFR BLD AUTO: 1 %
BILIRUB SERPL-MCNC: 0.3 MG/DL
BUN SERPL-MCNC: 24.2 MG/DL (ref 9.8–20.1)
CALCIUM SERPL-MCNC: 9.1 MG/DL (ref 8.4–10.2)
CHLORIDE SERPL-SCNC: 110 MMOL/L (ref 98–107)
CO2 SERPL-SCNC: 24 MMOL/L (ref 23–31)
CREAT SERPL-MCNC: 0.74 MG/DL (ref 0.55–1.02)
CREAT/UREA NIT SERPL: 33
EOSINOPHIL # BLD AUTO: 0.09 X10(3)/MCL (ref 0–0.9)
EOSINOPHIL NFR BLD AUTO: 1.8 %
ERYTHROCYTE [DISTWIDTH] IN BLOOD BY AUTOMATED COUNT: 13.2 % (ref 11.5–17)
FERRITIN SERPL-MCNC: 168.34 NG/ML (ref 4.63–204)
FOLATE SERPL-MCNC: 10.5 NG/ML (ref 7–31.4)
GFR SERPLBLD CREATININE-BSD FMLA CKD-EPI: >60 ML/MIN/1.73/M2
GLOBULIN SER-MCNC: 3.6 GM/DL (ref 2.4–3.5)
GLUCOSE SERPL-MCNC: 124 MG/DL (ref 82–115)
HCT VFR BLD AUTO: 28.5 % (ref 37–47)
HGB BLD-MCNC: 9.3 G/DL (ref 12–16)
IMM GRANULOCYTES # BLD AUTO: 0.01 X10(3)/MCL (ref 0–0.04)
IMM GRANULOCYTES NFR BLD AUTO: 0.2 %
IRON SATN MFR SERPL: 39 % (ref 20–50)
IRON SERPL-MCNC: 82 UG/DL (ref 50–170)
LDH SERPL-CCNC: 771 U/L (ref 125–220)
LYMPHOCYTES # BLD AUTO: 0.97 X10(3)/MCL (ref 0.6–4.6)
LYMPHOCYTES NFR BLD AUTO: 19.5 %
MCH RBC QN AUTO: 33.5 PG (ref 27–31)
MCHC RBC AUTO-ENTMCNC: 32.6 G/DL (ref 33–36)
MCV RBC AUTO: 102.5 FL (ref 80–94)
MONOCYTES # BLD AUTO: 0.48 X10(3)/MCL (ref 0.1–1.3)
MONOCYTES NFR BLD AUTO: 9.7 %
NEUTROPHILS # BLD AUTO: 3.37 X10(3)/MCL (ref 2.1–9.2)
NEUTROPHILS NFR BLD AUTO: 67.8 %
PLATELET # BLD AUTO: 632 X10(3)/MCL (ref 130–400)
PMV BLD AUTO: 9.3 FL (ref 7.4–10.4)
POTASSIUM SERPL-SCNC: 4.3 MMOL/L (ref 3.5–5.1)
PROT SERPL-MCNC: 7.3 GM/DL (ref 5.8–7.6)
RBC # BLD AUTO: 2.78 X10(6)/MCL (ref 4.2–5.4)
SODIUM SERPL-SCNC: 143 MMOL/L (ref 136–145)
TIBC SERPL-MCNC: 129 UG/DL (ref 70–310)
TIBC SERPL-MCNC: 211 UG/DL (ref 250–450)
TRANSFERRIN SERPL-MCNC: 178 MG/DL (ref 173–360)
VIT B12 SERPL-MCNC: 258 PG/ML (ref 213–816)
WBC # BLD AUTO: 4.97 X10(3)/MCL (ref 4.5–11.5)

## 2025-08-07 PROCEDURE — 1101F PT FALLS ASSESS-DOCD LE1/YR: CPT | Mod: CPTII,S$GLB,, | Performed by: NURSE PRACTITIONER

## 2025-08-07 PROCEDURE — 4010F ACE/ARB THERAPY RXD/TAKEN: CPT | Mod: CPTII,S$GLB,, | Performed by: NURSE PRACTITIONER

## 2025-08-07 PROCEDURE — 82607 VITAMIN B-12: CPT

## 2025-08-07 PROCEDURE — 99999 PR PBB SHADOW E&M-EST. PATIENT-LVL IV: CPT | Mod: PBBFAC,,, | Performed by: NURSE PRACTITIONER

## 2025-08-07 PROCEDURE — 82728 ASSAY OF FERRITIN: CPT

## 2025-08-07 PROCEDURE — 3078F DIAST BP <80 MM HG: CPT | Mod: CPTII,S$GLB,, | Performed by: NURSE PRACTITIONER

## 2025-08-07 PROCEDURE — 36415 COLL VENOUS BLD VENIPUNCTURE: CPT

## 2025-08-07 PROCEDURE — 83540 ASSAY OF IRON: CPT

## 2025-08-07 PROCEDURE — 85025 COMPLETE CBC W/AUTO DIFF WBC: CPT

## 2025-08-07 PROCEDURE — 1159F MED LIST DOCD IN RCRD: CPT | Mod: CPTII,S$GLB,, | Performed by: NURSE PRACTITIONER

## 2025-08-07 PROCEDURE — 1126F AMNT PAIN NOTED NONE PRSNT: CPT | Mod: CPTII,S$GLB,, | Performed by: NURSE PRACTITIONER

## 2025-08-07 PROCEDURE — 1157F ADVNC CARE PLAN IN RCRD: CPT | Mod: CPTII,S$GLB,, | Performed by: NURSE PRACTITIONER

## 2025-08-07 PROCEDURE — 99215 OFFICE O/P EST HI 40 MIN: CPT | Mod: S$GLB,,, | Performed by: NURSE PRACTITIONER

## 2025-08-07 PROCEDURE — 3074F SYST BP LT 130 MM HG: CPT | Mod: CPTII,S$GLB,, | Performed by: NURSE PRACTITIONER

## 2025-08-07 PROCEDURE — 3288F FALL RISK ASSESSMENT DOCD: CPT | Mod: CPTII,S$GLB,, | Performed by: NURSE PRACTITIONER

## 2025-08-07 PROCEDURE — 80053 COMPREHEN METABOLIC PANEL: CPT

## 2025-08-07 PROCEDURE — G2211 COMPLEX E/M VISIT ADD ON: HCPCS | Mod: S$GLB,,, | Performed by: NURSE PRACTITIONER

## 2025-08-07 PROCEDURE — 82746 ASSAY OF FOLIC ACID SERUM: CPT

## 2025-08-07 PROCEDURE — 83615 LACTATE (LD) (LDH) ENZYME: CPT

## 2025-08-07 PROCEDURE — 3008F BODY MASS INDEX DOCD: CPT | Mod: CPTII,S$GLB,, | Performed by: NURSE PRACTITIONER

## 2025-08-07 NOTE — PROGRESS NOTES
HEMATOLOGY/ONCOLOGY OFFICE CLINIC VISIT    Visit Information:  Visit type:  On-going care, Review/discussion of tests, Scheduled follow-up.    Accompanied by:  No one.    Source of history:  Self.    Referral source:  Roc GIFFORD, Isaac HUANG    History limitation:  None.        Initial Consultation: 3/28/2022  Referring Physician: Dr Brian  Other Physicians:  Code Status: Not addressed    Diagnosis/Problem list:   Essential Thrombocytosis  --CALR mutation analysis, exon 9: Positive.    Present Treatment:  Hydrea 500 mg BID + Agrylin 0.5 mg daily on 9/12/23  Agrylin 1.0 mg TID on 6/3/2025      Treatment history:    Hydrea 500 mg Daily + Agrylin 1 mg daily (2mg on Sat/Sun only)  Hydrea 500 mg BID + Agrylin 1 mg daily (2/9/23)  Hydrea held + Agryling 0.5 mg TID- 5/15/2025 due to leg ulcer. 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 Peripheral blood, JAK2 V617F mutation analysis: Negative for JAK2 V617F.    CLINICAL HISTORY:       Patient: 75 years old female kindly referred for thrombocytosis.     Reviewing electronic on medical record it seems like her platelets were normal up to March 2018.  In March 2019 platelets were mildly elevated 496,009 slowly there were trending up with the most recent on 3/15/2022 of 1,012,000.  She is taking baby aspirin.    March 21, 2022 15:21 Peripheral blood, CALR mutation analysis, exon 9: Positive. 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  Peripheral blood, JAK2 V617F mutation analysis: Negative for JAK2 V617F.    She denies any family history of blood disorders or malignancies.        Chief Complaint: 3 Week Follow Up    Here for ET    Interval History:  She was taking Hydrea 500 mg BID and Agrylin 1 mg daily since 2/9/23. In 9/2023, we decreased Agrylin to  0.5 mg daily d/t anemia.  On 4/17/24, we dropped the weekend doses of Agrylin so that she would take 0.5 mg daily M-F only. She was mildly anemic before she even started Hydrea in 3/2022. Her WBC is decreased but stable.  She denies any signs or symptoms of infection.  She denies any fever, chills, sweats.  No chest pain or shortness of breath.  No bleeding.   No headaches or changes in vision. No neurologic deficit.     Interval history  08/07/25:  Aimee Oseguera presents for follow-up of essential thrombocythemia.   Currently managing essential thrombocythemia with Anagrelide 1mg three times daily. Platelets increased from 539k to 632k.  from 557 to 539k. Hemoglobin decreased to 9.3. White blood cell count normal.. She had vein procedure to each leg one week apart. Reports decreased swelling to lower extremities bilaterally. The sore on her left leg as healed. Pathology received from dermatology. It was not squamous cell carcinoma.     Review of Systems   Constitutional:  Positive for malaise/fatigue.   Respiratory:  Negative for cough and shortness of breath.    Cardiovascular:  Negative for chest pain.   Gastrointestinal:  Negative for abdominal pain and diarrhea.   Genitourinary:  Negative for frequency.   Musculoskeletal:  Negative for back pain.   Skin:  Negative for rash.        UlcerLLE   Neurological:  Positive for headaches.   Psychiatric/Behavioral:  The patient is nervous/anxious.      Histories:  PMH/PSH/FH/SOCIAL/ALLERGIES AND MEDS REVIEWED AND UPDATED AS APPROPRIATE     Vitals:    08/07/25 0940   Weight: 77 kg (169 lb 12.8 oz)              Physical Exam  Vitals and nursing note reviewed.   Constitutional:       General: She is not in acute distress.     Appearance: Normal appearance. She is well-developed.   HENT:      Head: Normocephalic and atraumatic.      Mouth/Throat:      Mouth: Mucous membranes are moist.   Eyes:      General: No scleral icterus.     Extraocular Movements: Extraocular  movements intact.      Conjunctiva/sclera: Conjunctivae normal.      Pupils: Pupils are equal, round, and reactive to light.   Neck:      Vascular: No JVD.   Cardiovascular:      Rate and Rhythm: Normal rate and regular rhythm.      Heart sounds: No murmur heard.  Pulmonary:      Effort: Pulmonary effort is normal.      Breath sounds: Normal breath sounds. No wheezing or rhonchi.   Chest:      Chest wall: No deformity or tenderness.   Breasts:     Right: No swelling, mass, nipple discharge, skin change or tenderness.      Left: No swelling, mass, nipple discharge, skin change or tenderness.   Abdominal:      General: Bowel sounds are normal. There is no distension.      Palpations: Abdomen is soft. There is no splenomegaly or mass.      Tenderness: There is no abdominal tenderness.   Musculoskeletal:         General: No swelling or deformity.      Cervical back: Neck supple.      Right lower leg: Edema present.      Left lower leg: Edema present.   Lymphadenopathy:      Cervical: No cervical adenopathy.      Upper Body:      Right upper body: No supraclavicular or axillary adenopathy.      Left upper body: No supraclavicular or axillary adenopathy.      Lower Body: No right inguinal adenopathy. No left inguinal adenopathy.   Skin:     General: Skin is warm.      Coloration: Skin is not jaundiced.      Findings: Lesion (LLE leg ulcer above the ankle size of a dime, dry and healed.) present. No rash.      Nails: There is no clubbing.   Neurological:      General: No focal deficit present.      Mental Status: She is alert and oriented to person, place, and time.      Sensory: Sensation is intact.      Motor: Motor function is intact.      Gait: Gait is intact.   Psychiatric:         Attention and Perception: Attention normal.         Mood and Affect: Mood and affect normal.         Speech: Speech normal.         Behavior: Behavior is cooperative.         Thought Content: Thought content normal.         Cognition and  Memory: Cognition normal.         Judgment: Judgment normal.     ECOG SCORE             Laboratory:  CBC with Differential:  Lab Results   Component Value Date    WBC 4.97 08/07/2025    RBC 2.78 (L) 08/07/2025    HGB 9.3 (L) 08/07/2025    HCT 28.5 (L) 08/07/2025    .5 (H) 08/07/2025    MCH 33.5 (H) 08/07/2025    MCHC 32.6 (L) 08/07/2025    RDW 13.2 08/07/2025     (H) 08/07/2025    MPV 9.3 08/07/2025       Latest Reference Range & Units 05/23/22 08:40 06/09/22 07:37 06/23/22 11:27 07/07/22 07:27 07/21/22 11:18 08/04/22 08:10 08/18/22 10:52   WBC 4.5 - 11.5 x10(3)/mcL 2.6 (L) 3.3 (L) 4.0 (L) 3.1 (L) 3.4 (L) 3.5 (L) 4.1 (L)   (L): Data is abnormally low     Reference Range & Units 05/23/22 08:40 06/09/22 07:37 06/23/22 11:27 07/07/22 07:27 07/21/22 11:18 08/04/22 08:10 08/18/22 10:52   Platelets 130 - 400 x10(3)/mcL 449 (H) 557 (H) 612 (H) 623 (H) 646 (H) 673 (H) 590 (H)   (H): Data is abnormally high   Reference Range & Units 05/23/22 08:40 06/09/22 07:37 06/23/22 11:27 07/07/22 07:27 07/21/22 11:18 08/04/22 08:10 08/18/22 10:52   Hemoglobin 12.0 - 16.0 gm/dL 10.5 (L) 10.6 (L) 10.8 (L) 10.9 (L) 11.0 (L) 10.8 (L) 11.3 (L)   (L): Data is abnormally low    CMP:  Sodium   Date Value Ref Range Status   07/17/2025 143 136 - 145 mmol/L Final     Potassium   Date Value Ref Range Status   07/17/2025 4.4 3.5 - 5.1 mmol/L Final     Chloride   Date Value Ref Range Status   07/17/2025 109 (H) 98 - 107 mmol/L Final     CO2   Date Value Ref Range Status   07/17/2025 25 23 - 31 mmol/L Final     Glucose   Date Value Ref Range Status   07/17/2025 109 82 - 115 mg/dL Final     Blood Urea Nitrogen   Date Value Ref Range Status   07/17/2025 21.8 (H) 9.8 - 20.1 mg/dL Final     Creatinine   Date Value Ref Range Status   07/17/2025 0.74 0.55 - 1.02 mg/dL Final     Calcium   Date Value Ref Range Status   07/17/2025 9.1 8.4 - 10.2 mg/dL Final     Protein Total   Date Value Ref Range Status   07/17/2025 7.3 5.8 - 7.6 gm/dL Final      Albumin   Date Value Ref Range Status   07/17/2025 3.6 3.4 - 4.8 g/dL Final     Bilirubin Total   Date Value Ref Range Status   07/17/2025 0.3 <=1.5 mg/dL Final     ALP   Date Value Ref Range Status   07/17/2025 97 40 - 150 unit/L Final     AST   Date Value Ref Range Status   07/17/2025 26 11 - 45 unit/L Final     ALT   Date Value Ref Range Status   07/17/2025 10 0 - 55 unit/L Final     Estimated GFR-Non    Date Value Ref Range Status   08/04/2022 >60 mls/min/1.73/m2 Final         Assessment:         1. Essential thrombocytosis    2. Therapeutic drug monitoring          Essential thrombocytosis -- CALR mutation positive, 52 bp deletion Exon 9   --on Hydrea and Agrylin  Anemia-due to therapy  Leukopenia-due to therapy       Plan:   Currently on Agrylin 1 mg TID. Platelet count trending up ( now over 600k). Case discussed with Dr. Krishnan.  Will resume hydroxyurea 500 mg bid and decrease Agrylin to 1 mg daily.   Weekly labs  RTC in 4 weeks, lab same day.     - code applied: patient requires or will require a continuous, longitudinal and active collaborative plan of care related to this patient's health condition, essential thrombocytosis--- the management of which requires the direction of a practitioner with specialized clinical knowledge, skill and expertise.        Margret Simmons

## 2025-08-29 DIAGNOSIS — D47.3 ESSENTIAL THROMBOCYTOSIS: Primary | ICD-10-CM

## 2025-08-29 RX ORDER — HYDROXYUREA 500 MG/1
500 CAPSULE ORAL 2 TIMES DAILY
Qty: 60 CAPSULE | Refills: 3 | Status: SHIPPED | OUTPATIENT
Start: 2025-08-29 | End: 2025-12-27